# Patient Record
Sex: MALE | Race: WHITE | NOT HISPANIC OR LATINO | Employment: OTHER | URBAN - NONMETROPOLITAN AREA
[De-identification: names, ages, dates, MRNs, and addresses within clinical notes are randomized per-mention and may not be internally consistent; named-entity substitution may affect disease eponyms.]

---

## 2017-03-22 ENCOUNTER — HOSPITAL ENCOUNTER (INPATIENT)
Facility: HOSPITAL | Age: 72
LOS: 1 days | Discharge: HOME | DRG: 571 | End: 2017-03-24
Attending: NURSE PRACTITIONER | Admitting: SURGERY
Payer: COMMERCIAL

## 2017-03-22 ENCOUNTER — ANESTHESIA (OUTPATIENT)
Dept: SURGERY | Facility: HOSPITAL | Age: 72
DRG: 571 | End: 2017-03-22
Payer: COMMERCIAL

## 2017-03-22 ENCOUNTER — ANESTHESIA EVENT (OUTPATIENT)
Dept: SURGERY | Facility: HOSPITAL | Age: 72
DRG: 571 | End: 2017-03-22
Payer: COMMERCIAL

## 2017-03-22 DIAGNOSIS — S21.209A: Primary | ICD-10-CM

## 2017-03-22 DIAGNOSIS — L02.212 ABSCESS OF BACK: ICD-10-CM

## 2017-03-22 LAB
ALBUMIN SERPL-MCNC: 3.3 G/DL (ref 3.4–5)
ALP SERPL-CCNC: 75 U/L (ref 40–150)
ALT SERPL W P-5'-P-CCNC: 31 U/L (ref 0–70)
ANION GAP SERPL CALCULATED.3IONS-SCNC: 9 MMOL/L (ref 3–14)
AST SERPL W P-5'-P-CCNC: 12 U/L (ref 0–45)
BACTERIA SPEC CULT: NORMAL
BASOPHILS # BLD AUTO: 0.1 10E9/L (ref 0–0.2)
BASOPHILS NFR BLD AUTO: 0.5 %
BILIRUB SERPL-MCNC: 0.4 MG/DL (ref 0.2–1.3)
BUN SERPL-MCNC: 6 MG/DL (ref 7–30)
CALCIUM SERPL-MCNC: 9.1 MG/DL (ref 8.5–10.1)
CHLORIDE SERPL-SCNC: 99 MMOL/L (ref 94–109)
CO2 SERPL-SCNC: 26 MMOL/L (ref 20–32)
CREAT SERPL-MCNC: 0.73 MG/DL (ref 0.66–1.25)
CRP SERPL-MCNC: 165 MG/L (ref 0–8)
DIFFERENTIAL METHOD BLD: ABNORMAL
EOSINOPHIL # BLD AUTO: 0.1 10E9/L (ref 0–0.7)
EOSINOPHIL NFR BLD AUTO: 0.8 %
ERYTHROCYTE [DISTWIDTH] IN BLOOD BY AUTOMATED COUNT: 12.5 % (ref 10–15)
ERYTHROCYTE [SEDIMENTATION RATE] IN BLOOD BY WESTERGREN METHOD: 44 MM/H (ref 0–20)
GFR SERPL CREATININE-BSD FRML MDRD: ABNORMAL ML/MIN/1.7M2
GLUCOSE BLDC GLUCOMTR-MCNC: 166 MG/DL (ref 70–99)
GLUCOSE BLDC GLUCOMTR-MCNC: 250 MG/DL (ref 70–99)
GLUCOSE BLDC GLUCOMTR-MCNC: 439 MG/DL (ref 70–99)
GLUCOSE BLDC GLUCOMTR-MCNC: 441 MG/DL (ref 70–99)
GLUCOSE BLDC GLUCOMTR-MCNC: 466 MG/DL (ref 70–99)
GLUCOSE SERPL-MCNC: 190 MG/DL (ref 70–99)
GRAM STN SPEC: ABNORMAL
GRAM STN SPEC: NORMAL
GRAM STN SPEC: NORMAL
HCT VFR BLD AUTO: 42.8 % (ref 40–53)
HGB BLD-MCNC: 14.4 G/DL (ref 13.3–17.7)
IMM GRANULOCYTES # BLD: 0.3 10E9/L (ref 0–0.4)
IMM GRANULOCYTES NFR BLD: 2.2 %
LYMPHOCYTES # BLD AUTO: 1.9 10E9/L (ref 0.8–5.3)
LYMPHOCYTES NFR BLD AUTO: 13.2 %
MCH RBC QN AUTO: 29.3 PG (ref 26.5–33)
MCHC RBC AUTO-ENTMCNC: 33.6 G/DL (ref 31.5–36.5)
MCV RBC AUTO: 87 FL (ref 78–100)
MICRO REPORT STATUS: ABNORMAL
MICRO REPORT STATUS: NORMAL
MONOCYTES # BLD AUTO: 1.3 10E9/L (ref 0–1.3)
MONOCYTES NFR BLD AUTO: 8.7 %
NEUTROPHILS # BLD AUTO: 10.8 10E9/L (ref 1.6–8.3)
NEUTROPHILS NFR BLD AUTO: 74.6 %
NRBC # BLD AUTO: 0 10*3/UL
NRBC BLD AUTO-RTO: 0 /100
PLATELET # BLD AUTO: 385 10E9/L (ref 150–450)
POTASSIUM SERPL-SCNC: 4.1 MMOL/L (ref 3.4–5.3)
PROT SERPL-MCNC: 8.4 G/DL (ref 6.8–8.8)
RBC # BLD AUTO: 4.91 10E12/L (ref 4.4–5.9)
SODIUM SERPL-SCNC: 134 MMOL/L (ref 133–144)
SPECIMEN SOURCE: ABNORMAL
SPECIMEN SOURCE: NORMAL
WBC # BLD AUTO: 14.5 10E9/L (ref 4–11)

## 2017-03-22 PROCEDURE — 25000125 ZZHC RX 250: Performed by: NURSE PRACTITIONER

## 2017-03-22 PROCEDURE — 86140 C-REACTIVE PROTEIN: CPT | Performed by: NURSE PRACTITIONER

## 2017-03-22 PROCEDURE — 99214 OFFICE O/P EST MOD 30 MIN: CPT

## 2017-03-22 PROCEDURE — 25000132 ZZH RX MED GY IP 250 OP 250 PS 637: Performed by: SURGERY

## 2017-03-22 PROCEDURE — G0378 HOSPITAL OBSERVATION PER HR: HCPCS

## 2017-03-22 PROCEDURE — 87076 CULTURE ANAEROBE IDENT EACH: CPT | Performed by: SURGERY

## 2017-03-22 PROCEDURE — 87075 CULTR BACTERIA EXCEPT BLOOD: CPT | Performed by: SURGERY

## 2017-03-22 PROCEDURE — 87205 SMEAR GRAM STAIN: CPT | Performed by: NURSE PRACTITIONER

## 2017-03-22 PROCEDURE — 93005 ELECTROCARDIOGRAM TRACING: CPT

## 2017-03-22 PROCEDURE — 85025 COMPLETE CBC W/AUTO DIFF WBC: CPT | Performed by: NURSE PRACTITIONER

## 2017-03-22 PROCEDURE — 87070 CULTURE OTHR SPECIMN AEROBIC: CPT | Performed by: NURSE PRACTITIONER

## 2017-03-22 PROCEDURE — 37000009 ZZH ANESTHESIA TECHNICAL FEE, EACH ADDTL 15 MIN: Performed by: SURGERY

## 2017-03-22 PROCEDURE — S0077 INJECTION, CLINDAMYCIN PHOSP: HCPCS | Performed by: NURSE PRACTITIONER

## 2017-03-22 PROCEDURE — 37000008 ZZH ANESTHESIA TECHNICAL FEE, 1ST 30 MIN: Performed by: SURGERY

## 2017-03-22 PROCEDURE — 85652 RBC SED RATE AUTOMATED: CPT | Performed by: NURSE PRACTITIONER

## 2017-03-22 PROCEDURE — 11043 DBRDMT MUSC&/FSCA 1ST 20/<: CPT | Performed by: SURGERY

## 2017-03-22 PROCEDURE — 99222 1ST HOSP IP/OBS MODERATE 55: CPT | Mod: 25 | Performed by: SURGERY

## 2017-03-22 PROCEDURE — 25000125 ZZHC RX 250: Performed by: NURSE ANESTHETIST, CERTIFIED REGISTERED

## 2017-03-22 PROCEDURE — 87070 CULTURE OTHR SPECIMN AEROBIC: CPT | Performed by: SURGERY

## 2017-03-22 PROCEDURE — 93010 ELECTROCARDIOGRAM REPORT: CPT | Performed by: INTERNAL MEDICINE

## 2017-03-22 PROCEDURE — 99203 OFFICE O/P NEW LOW 30 MIN: CPT | Performed by: NURSE PRACTITIONER

## 2017-03-22 PROCEDURE — S0077 INJECTION, CLINDAMYCIN PHOSP: HCPCS | Performed by: SURGERY

## 2017-03-22 PROCEDURE — 0JB70ZZ EXCISION OF BACK SUBCUTANEOUS TISSUE AND FASCIA, OPEN APPROACH: ICD-10-PCS | Performed by: SURGERY

## 2017-03-22 PROCEDURE — 40000268 ZZH STATISTIC NO CHARGES

## 2017-03-22 PROCEDURE — 87205 SMEAR GRAM STAIN: CPT | Performed by: SURGERY

## 2017-03-22 PROCEDURE — 25000131 ZZH RX MED GY IP 250 OP 636 PS 637: Performed by: SURGERY

## 2017-03-22 PROCEDURE — 40000786 ZZHCL STATISTIC ACTIVE MRSA SURVEILLANCE CULTURE: Performed by: SURGERY

## 2017-03-22 PROCEDURE — 25800025 ZZH RX 258: Performed by: NURSE ANESTHETIST, CERTIFIED REGISTERED

## 2017-03-22 PROCEDURE — 36415 COLL VENOUS BLD VENIPUNCTURE: CPT | Performed by: NURSE PRACTITIONER

## 2017-03-22 PROCEDURE — 36000044 ZZH SURGERY LEVEL 1 1ST 30 MIN: Performed by: SURGERY

## 2017-03-22 PROCEDURE — 25000566 ZZH SEVOFLURANE, EA 15 MIN: Performed by: NURSE ANESTHETIST, CERTIFIED REGISTERED

## 2017-03-22 PROCEDURE — 71000014 ZZH RECOVERY PHASE 1 LEVEL 2 FIRST HR: Performed by: SURGERY

## 2017-03-22 PROCEDURE — 40000305 ZZH STATISTIC PRE PROC ASSESS I: Performed by: SURGERY

## 2017-03-22 PROCEDURE — 25000128 H RX IP 250 OP 636: Performed by: NURSE PRACTITIONER

## 2017-03-22 PROCEDURE — 36000046 ZZH SURGERY LEVEL 1 EA 15 ADDTL MIN: Performed by: SURGERY

## 2017-03-22 PROCEDURE — 01999 UNLISTED ANES PROCEDURE: CPT | Performed by: NURSE ANESTHETIST, CERTIFIED REGISTERED

## 2017-03-22 PROCEDURE — 80053 COMPREHEN METABOLIC PANEL: CPT | Performed by: NURSE PRACTITIONER

## 2017-03-22 PROCEDURE — 25000128 H RX IP 250 OP 636: Performed by: NURSE ANESTHETIST, CERTIFIED REGISTERED

## 2017-03-22 PROCEDURE — 00000146 ZZHCL STATISTIC GLUCOSE BY METER IP

## 2017-03-22 PROCEDURE — 25000125 ZZHC RX 250: Performed by: SURGERY

## 2017-03-22 RX ORDER — METFORMIN HCL 500 MG
2000 TABLET, EXTENDED RELEASE 24 HR ORAL DAILY
COMMUNITY
End: 2018-02-12

## 2017-03-22 RX ORDER — LISINOPRIL 2.5 MG/1
1.25 TABLET ORAL DAILY
Status: DISCONTINUED | OUTPATIENT
Start: 2017-03-22 | End: 2017-03-22

## 2017-03-22 RX ORDER — SODIUM CHLORIDE 9 MG/ML
INJECTION, SOLUTION INTRAVENOUS CONTINUOUS
Status: DISCONTINUED | OUTPATIENT
Start: 2017-03-22 | End: 2017-03-23

## 2017-03-22 RX ORDER — ONDANSETRON 2 MG/ML
4 INJECTION INTRAMUSCULAR; INTRAVENOUS EVERY 6 HOURS PRN
Status: DISCONTINUED | OUTPATIENT
Start: 2017-03-22 | End: 2017-03-23

## 2017-03-22 RX ORDER — HYDRALAZINE HYDROCHLORIDE 20 MG/ML
2.5-5 INJECTION INTRAMUSCULAR; INTRAVENOUS EVERY 10 MIN PRN
Status: DISCONTINUED | OUTPATIENT
Start: 2017-03-22 | End: 2017-03-23

## 2017-03-22 RX ORDER — HYDROCODONE BITARTRATE AND ACETAMINOPHEN 5; 325 MG/1; MG/1
1 TABLET ORAL EVERY 6 HOURS PRN
COMMUNITY
End: 2017-04-03

## 2017-03-22 RX ORDER — HYDROMORPHONE HYDROCHLORIDE 1 MG/ML
.3-.5 INJECTION, SOLUTION INTRAMUSCULAR; INTRAVENOUS; SUBCUTANEOUS
Status: DISCONTINUED | OUTPATIENT
Start: 2017-03-22 | End: 2017-03-22

## 2017-03-22 RX ORDER — FENTANYL CITRATE 50 UG/ML
25-50 INJECTION, SOLUTION INTRAMUSCULAR; INTRAVENOUS
Status: DISCONTINUED | OUTPATIENT
Start: 2017-03-22 | End: 2017-03-22 | Stop reason: HOSPADM

## 2017-03-22 RX ORDER — LISINOPRIL 5 MG/1
5 TABLET ORAL DAILY
Status: DISCONTINUED | OUTPATIENT
Start: 2017-03-22 | End: 2017-03-23

## 2017-03-22 RX ORDER — OXYCODONE HYDROCHLORIDE 5 MG/1
5 TABLET ORAL EVERY 4 HOURS PRN
Status: DISCONTINUED | OUTPATIENT
Start: 2017-03-22 | End: 2017-03-23

## 2017-03-22 RX ORDER — SODIUM CHLORIDE, SODIUM LACTATE, POTASSIUM CHLORIDE, CALCIUM CHLORIDE 600; 310; 30; 20 MG/100ML; MG/100ML; MG/100ML; MG/100ML
INJECTION, SOLUTION INTRAVENOUS CONTINUOUS
Status: DISCONTINUED | OUTPATIENT
Start: 2017-03-22 | End: 2017-03-22 | Stop reason: HOSPADM

## 2017-03-22 RX ORDER — NALOXONE HYDROCHLORIDE 0.4 MG/ML
.1-.4 INJECTION, SOLUTION INTRAMUSCULAR; INTRAVENOUS; SUBCUTANEOUS
Status: CANCELLED | OUTPATIENT
Start: 2017-03-22

## 2017-03-22 RX ORDER — ACETAMINOPHEN 325 MG/1
325 TABLET ORAL EVERY 4 HOURS PRN
Status: DISCONTINUED | OUTPATIENT
Start: 2017-03-22 | End: 2017-03-23

## 2017-03-22 RX ORDER — MEPERIDINE HYDROCHLORIDE 25 MG/ML
12.5 INJECTION INTRAMUSCULAR; INTRAVENOUS; SUBCUTANEOUS
Status: DISCONTINUED | OUTPATIENT
Start: 2017-03-22 | End: 2017-03-22 | Stop reason: HOSPADM

## 2017-03-22 RX ORDER — ONDANSETRON 2 MG/ML
INJECTION INTRAMUSCULAR; INTRAVENOUS PRN
Status: DISCONTINUED | OUTPATIENT
Start: 2017-03-22 | End: 2017-03-22

## 2017-03-22 RX ORDER — LABETALOL HYDROCHLORIDE 5 MG/ML
10 INJECTION, SOLUTION INTRAVENOUS
Status: DISCONTINUED | OUTPATIENT
Start: 2017-03-22 | End: 2017-03-22 | Stop reason: HOSPADM

## 2017-03-22 RX ORDER — DICLOXACILLIN SODIUM 500 MG
500 CAPSULE ORAL 4 TIMES DAILY
COMMUNITY
End: 2017-03-27

## 2017-03-22 RX ORDER — HYDROMORPHONE HYDROCHLORIDE 1 MG/ML
.3-.5 INJECTION, SOLUTION INTRAMUSCULAR; INTRAVENOUS; SUBCUTANEOUS EVERY 10 MIN PRN
Status: DISCONTINUED | OUTPATIENT
Start: 2017-03-22 | End: 2017-03-22 | Stop reason: HOSPADM

## 2017-03-22 RX ORDER — SODIUM CHLORIDE 9 MG/ML
INJECTION, SOLUTION INTRAVENOUS CONTINUOUS
Status: CANCELLED | OUTPATIENT
Start: 2017-03-22

## 2017-03-22 RX ORDER — ONDANSETRON 2 MG/ML
4 INJECTION INTRAMUSCULAR; INTRAVENOUS EVERY 30 MIN PRN
Status: DISCONTINUED | OUTPATIENT
Start: 2017-03-22 | End: 2017-03-22 | Stop reason: HOSPADM

## 2017-03-22 RX ORDER — ONDANSETRON 2 MG/ML
4 INJECTION INTRAMUSCULAR; INTRAVENOUS EVERY 6 HOURS PRN
Status: DISCONTINUED | OUTPATIENT
Start: 2017-03-22 | End: 2017-03-22

## 2017-03-22 RX ORDER — PROPOFOL 10 MG/ML
INJECTION, EMULSION INTRAVENOUS PRN
Status: DISCONTINUED | OUTPATIENT
Start: 2017-03-22 | End: 2017-03-22

## 2017-03-22 RX ORDER — DEXTROSE MONOHYDRATE 25 G/50ML
25-50 INJECTION, SOLUTION INTRAVENOUS
Status: DISCONTINUED | OUTPATIENT
Start: 2017-03-22 | End: 2017-03-22

## 2017-03-22 RX ORDER — HYDROMORPHONE HYDROCHLORIDE 1 MG/ML
.3-.5 INJECTION, SOLUTION INTRAMUSCULAR; INTRAVENOUS; SUBCUTANEOUS
Status: DISCONTINUED | OUTPATIENT
Start: 2017-03-22 | End: 2017-03-23

## 2017-03-22 RX ORDER — HYDROCODONE BITARTRATE AND ACETAMINOPHEN 5; 325 MG/1; MG/1
1 TABLET ORAL EVERY 6 HOURS PRN
Status: DISCONTINUED | OUTPATIENT
Start: 2017-03-22 | End: 2017-03-23

## 2017-03-22 RX ORDER — CLINDAMYCIN IN PERCENT DEXTROSE 6 MG/ML
300 INJECTION, SOLUTION INTRAVENOUS EVERY 6 HOURS
Status: DISCONTINUED | OUTPATIENT
Start: 2017-03-22 | End: 2017-03-24

## 2017-03-22 RX ORDER — ALBUTEROL SULFATE 0.83 MG/ML
2.5 SOLUTION RESPIRATORY (INHALATION) EVERY 4 HOURS PRN
Status: DISCONTINUED | OUTPATIENT
Start: 2017-03-22 | End: 2017-03-22 | Stop reason: HOSPADM

## 2017-03-22 RX ORDER — ONDANSETRON 4 MG/1
4 TABLET, ORALLY DISINTEGRATING ORAL EVERY 30 MIN PRN
Status: DISCONTINUED | OUTPATIENT
Start: 2017-03-22 | End: 2017-03-22 | Stop reason: HOSPADM

## 2017-03-22 RX ORDER — CLINDAMYCIN IN PERCENT DEXTROSE 6 MG/ML
300 INJECTION, SOLUTION INTRAVENOUS EVERY 6 HOURS
Status: DISCONTINUED | OUTPATIENT
Start: 2017-03-22 | End: 2017-03-22

## 2017-03-22 RX ORDER — DEXAMETHASONE SODIUM PHOSPHATE 10 MG/ML
INJECTION, SOLUTION INTRAMUSCULAR; INTRAVENOUS PRN
Status: DISCONTINUED | OUTPATIENT
Start: 2017-03-22 | End: 2017-03-22

## 2017-03-22 RX ORDER — DEXTROSE MONOHYDRATE 25 G/50ML
25-50 INJECTION, SOLUTION INTRAVENOUS
Status: DISCONTINUED | OUTPATIENT
Start: 2017-03-22 | End: 2017-03-23

## 2017-03-22 RX ORDER — FENTANYL CITRATE 50 UG/ML
INJECTION, SOLUTION INTRAMUSCULAR; INTRAVENOUS PRN
Status: DISCONTINUED | OUTPATIENT
Start: 2017-03-22 | End: 2017-03-22

## 2017-03-22 RX ORDER — NICOTINE POLACRILEX 4 MG
15-30 LOZENGE BUCCAL
Status: DISCONTINUED | OUTPATIENT
Start: 2017-03-22 | End: 2017-03-23

## 2017-03-22 RX ORDER — NALOXONE HYDROCHLORIDE 0.4 MG/ML
.1-.4 INJECTION, SOLUTION INTRAMUSCULAR; INTRAVENOUS; SUBCUTANEOUS
Status: DISCONTINUED | OUTPATIENT
Start: 2017-03-22 | End: 2017-03-23

## 2017-03-22 RX ORDER — LIDOCAINE HYDROCHLORIDE 20 MG/ML
INJECTION, SOLUTION INFILTRATION; PERINEURAL PRN
Status: DISCONTINUED | OUTPATIENT
Start: 2017-03-22 | End: 2017-03-22

## 2017-03-22 RX ORDER — NALOXONE HYDROCHLORIDE 0.4 MG/ML
.1-.4 INJECTION, SOLUTION INTRAMUSCULAR; INTRAVENOUS; SUBCUTANEOUS
Status: DISCONTINUED | OUTPATIENT
Start: 2017-03-22 | End: 2017-03-22 | Stop reason: HOSPADM

## 2017-03-22 RX ORDER — NICOTINE POLACRILEX 4 MG
15-30 LOZENGE BUCCAL
Status: DISCONTINUED | OUTPATIENT
Start: 2017-03-22 | End: 2017-03-22

## 2017-03-22 RX ADMIN — FENTANYL CITRATE 100 MCG: 50 INJECTION, SOLUTION INTRAMUSCULAR; INTRAVENOUS at 15:51

## 2017-03-22 RX ADMIN — LISINOPRIL 5 MG: 5 TABLET ORAL at 21:04

## 2017-03-22 RX ADMIN — ONDANSETRON 4 MG: 2 INJECTION INTRAMUSCULAR; INTRAVENOUS at 15:48

## 2017-03-22 RX ADMIN — CLINDAMYCIN PHOSPHATE 300 MG: 6 INJECTION, SOLUTION INTRAVENOUS at 21:04

## 2017-03-22 RX ADMIN — CLINDAMYCIN PHOSPHATE 300 MG: 6 INJECTION, SOLUTION INTRAVENOUS at 15:48

## 2017-03-22 RX ADMIN — INSULIN ASPART 2 UNITS: 100 INJECTION, SOLUTION INTRAVENOUS; SUBCUTANEOUS at 18:38

## 2017-03-22 RX ADMIN — HYDROMORPHONE HYDROCHLORIDE 0.3 MG: 1 INJECTION, SOLUTION INTRAMUSCULAR; INTRAVENOUS; SUBCUTANEOUS at 16:50

## 2017-03-22 RX ADMIN — MIDAZOLAM HYDROCHLORIDE 2 MG: 1 INJECTION, SOLUTION INTRAMUSCULAR; INTRAVENOUS at 15:48

## 2017-03-22 RX ADMIN — FENTANYL CITRATE 50 MCG: 50 INJECTION, SOLUTION INTRAMUSCULAR; INTRAVENOUS at 16:11

## 2017-03-22 RX ADMIN — LIDOCAINE HYDROCHLORIDE 40 MG: 20 INJECTION, SOLUTION INFILTRATION; PERINEURAL at 15:55

## 2017-03-22 RX ADMIN — PROPOFOL 30 MG: 10 INJECTION, EMULSION INTRAVENOUS at 16:55

## 2017-03-22 RX ADMIN — PROPOFOL 200 MG: 10 INJECTION, EMULSION INTRAVENOUS at 15:55

## 2017-03-22 RX ADMIN — PROPOFOL 50 MG: 10 INJECTION, EMULSION INTRAVENOUS at 16:12

## 2017-03-22 RX ADMIN — DEXAMETHASONE SODIUM PHOSPHATE 10 MG: 10 INJECTION, SOLUTION INTRAMUSCULAR; INTRAVENOUS at 16:05

## 2017-03-22 RX ADMIN — HYDROMORPHONE HYDROCHLORIDE 0.2 MG: 1 INJECTION, SOLUTION INTRAMUSCULAR; INTRAVENOUS; SUBCUTANEOUS at 17:10

## 2017-03-22 RX ADMIN — SUCCINYLCHOLINE CHLORIDE 140 MG: 20 INJECTION, SOLUTION INTRAMUSCULAR; INTRAVENOUS at 15:55

## 2017-03-22 RX ADMIN — SODIUM CHLORIDE: 9 INJECTION, SOLUTION INTRAVENOUS at 14:30

## 2017-03-22 RX ADMIN — INSULIN ASPART 4 UNITS: 100 INJECTION, SOLUTION INTRAVENOUS; SUBCUTANEOUS at 21:46

## 2017-03-22 RX ADMIN — CLINDAMYCIN PHOSPHATE 300 MG: 6 INJECTION, SOLUTION INTRAVENOUS at 15:23

## 2017-03-22 RX ADMIN — SODIUM CHLORIDE, POTASSIUM CHLORIDE, SODIUM LACTATE AND CALCIUM CHLORIDE: 600; 310; 30; 20 INJECTION, SOLUTION INTRAVENOUS at 16:49

## 2017-03-22 RX ADMIN — FENTANYL CITRATE 50 MCG: 50 INJECTION, SOLUTION INTRAMUSCULAR; INTRAVENOUS at 16:14

## 2017-03-22 ASSESSMENT — ENCOUNTER SYMPTOMS: WOUND: 1

## 2017-03-22 NOTE — IP AVS SNAPSHOT
[facility]    [address] [place] [id number]    Phone:  [phone]    Fax:  [phone]                                       After Visit Summary   3/22/2017    Trevor Alexandre    MRN: [id number]           After Visit Summary Signature Page     I have received my discharge instructions, and my questions have been answered. I have discussed any challenges I see with this plan with the nurse or doctor.    ..........................................................................................................................................  Patient/Patient Representative Signature      ..........................................................................................................................................  Patient Representative Print Name and Relationship to Patient    ..................................................               ................................................  Date                                            Time    ..........................................................................................................................................  Reviewed by Signature/Title    ...................................................              ..............................................  Date                                                            Time

## 2017-03-22 NOTE — ED PROVIDER NOTES
History     Chief Complaint   Patient presents with     Cyst     The history is provided by the patient and medical records. No  was used.     Trevor Alexandre is a 71 year old male who presents with an abscess to his back. Has been there for years, hit it recently and it started draining. [facility], [facility] on monocycline at that time. Symptoms didn't improve and was then started on doxycycline. Was referred to General Surgery for evaluation. Symptoms continued to worsen. Blood sugars running in the 150s, last A1c was 8.0. His glypizide was increased at that time.   Last ate at 6 AM, has had a few sips of water since.     I have reviewed the Medications, Allergies, Past Medical and Surgical History, and Social History in the Epic system.    Review of Systems   Skin: Positive for wound (Abscess to back.).       Physical Exam   BP: 151/95  Pulse: 88  Temp: 98.5  F (36.9  C)  Resp: 16  SpO2: 98 %  Physical Exam   Constitutional: He is oriented to person, place, and time. He appears well-developed and well-nourished. No distress.   HENT:   Head: Normocephalic and atraumatic.   Eyes: Conjunctivae are normal. No scleral icterus.   Neck: Normal range of motion. Neck supple.   Cardiovascular: Normal rate, regular rhythm and normal heart sounds.    Pulmonary/Chest: Effort normal and breath sounds normal.   Neurological: He is alert and oriented to person, place, and time.   Skin: He is not diaphoretic.        Psychiatric: He has a normal mood and affect. His behavior is normal.   Nursing note and vitals reviewed.      ED Course     ED Course     Procedures     Consulted with Dr. Dale, he will be down to see patient. Labs placed. Pt placed on NPO status.     Labs Ordered and Resulted from Time of ED Arrival Up to the Time of Departure from the ED   CBC WITH PLATELETS DIFFERENTIAL - Abnormal; Notable for the following:        Result Value    WBC 14.5 (*)     Absolute Neutrophil 10.8 (*)      All other components within normal limits   COMPREHENSIVE METABOLIC PANEL - Abnormal; Notable for the following:     Glucose 190 (*)     Urea Nitrogen 6 (*)     Albumin 3.3 (*)     All other components within normal limits   CRP INFLAMMATION - Abnormal; Notable for the following:     CRP Inflammation 165.0 (*)     All other components within normal limits   ERYTHROCYTE SEDIMENTATION RATE AUTO - Abnormal; Notable for the following:     Sed Rate 44 (*)     All other components within normal limits   WOUND CULTURE AEROBIC BACTERIAL   GRAM STAIN       Assessments & Plan (with Medical Decision Making)     I have reviewed the nursing notes.    I have reviewed the findings, diagnosis, plan and need for follow up with the patient.  Labs place, General Surgeon to see patient.   Patient to be taken to OR.   Call placed to [facility], left message for RN.   12:57 PM Notified Fee Basis at [facility].     1:40 PM Tammy called back from [facility], I updated her on patient status. Including [facility], [facility] - no response, worsening symptoms and presentation of abscess. Tammy will update his chart and contact patient post-operatively. Advised her of his likely wound care and follow up after surgery and extent of infection.      Final diagnoses:   Abscess of back       3/22/2017   [facility]     Nisha Brooks NP  03/22/17 1251       Nisha Brooks NP  03/22/17 2624

## 2017-03-22 NOTE — IP AVS SNAPSHOT
MRN:[id number]                      After Visit Summary   3/22/2017    Trevor Alexandre    MRN: [id number]           Thank you!     Thank you for choosing [facility] for your care. Our goal is always to provide you with excellent care. Hearing back from our patients is one way we can continue to improve our services. Please take a few minutes to complete the written survey that you may receive in the mail after you visit with us. Thank you!        Patient Information     Date Of Birth          1945        About your hospital stay     You were admitted on:  March 22, 2017 You last received care in the:  [facility]    You were discharged on:  March 24, 2017        Reason for your hospital stay       Incision and drainage back abscess                  Who to Call     For medical emergencies, please call 911.  For non-urgent questions about your medical care, please call your primary care provider or clinic, [phone]  For questions related to your surgery, please call your surgery clinic        Attending Provider     Provider Specialty    Nisha Brooks NP Urgent Care    Servando Dale MD General Surgery       Primary Care Provider Office Phone # Fax #    [facility] [phone]       [address] [place] [address]         When to contact your care team       Malfunctioning wound vac.  Increased pain, fevers, nausea/vomiting                  After Care Instructions     Activity       Your activity upon discharge: activity as tolerated, no driving while taking narcotics            Diet       Follow this diet upon discharge: Moderate consistent carbohydrate (9233-3182 wesley / 4-6 CHO units per meal)            Wound care and dressings       Instructions to care for your wound at home: follow wound care orders                  Follow-up Appointments     Follow-up and recommended labs and tests        Follow up with local surgeons within  2 weeks. Follow up with wound care next Monday 3/27/17 for wound vac change.  Should the wound vac stop working, present to medical professionals to switch to damp to dry dressings.                  Your next 10 appointments already scheduled     Mar 27, 2017  8:00 AM CDT   [facility] Visit with [facility]   [facility] ([facility] )    [address] [address]   [phone]            Mar 29, 2017  1:00 PM CDT   Return Visit with [facility]   [facility] ([facility] )    [address] [address]   [phone]            Mar 31, 2017  3:00 PM CDT   [facility] Visit with [facility]   [facility] ([facility] )    [address] [address]   [phone]            Apr 03, 2017  2:00 PM CDT   [facility] Visit with [facility]   [facility] ([facility] )    [address] [address]   [phone]            Apr 05, 2017  2:00 PM CDT   [facility] Visit with [facility]   [facility] ([facility] )    [address] [address]   [phone]            Apr 07, 2017  2:00 PM CDT   [facility] Visit with [facility]   [facility] ([facility] )    [address] [address]   [phone]            Apr 11, 2017 11:30 AM CDT   (Arrive by 11:15 AM)   Post Op with Tahir De Jesus DO   [facility] ([facility])    [address] [place] [id number]   [phone]              Additional Services     [facility]       WOUND/OSTOMY CENTER (WOC) TO EVALUATE, INITIATE TREATMENT BASED ON WOUND OSTOMY PROTOCOL, AND RECOMMEND AND REVISE AS NEEDED AN INDIVIDUALIZED TREATMENT CARE PLAN:    Conservative Sharp Debridement of non-viable and loose necrotic tissue  Topical 5% Lidocaine ointment   for pain associated with wound care/debridement; apply thin layer   Dakin s Solution (Sodium Hypochlorite  Topical 0.125%) to cleanse wounds with odor & signs of infection  Antifungal Powder and Cream   applied topically to areas of fungal dermatitis  Cadexomer Iodine (Iodosorb) 0.9% Topical Gel apply topically to wound beds with slough/soft necrotic tissue with suspected bioburden  Collagenase Enzymatic Debrider (Santyl ointment)   250units/gram apply topically to wound beds with necrotic debris/slough;   Acetic Acid 0.25% to cleanse wounds with odor and signs of infection  Lidocaine Hydrochloride  topical solution 4% to assist with Wound Vac dressing remove  Silver Sulfadiazine topical cream; apply topically to burns/wound beds  Hydrocortisone 1% cream; apply topically to areas with dermatitis  Silver nitrate topical stick to control minor bleeding, and applied to epibole and hypergranular tissue  Negative Pressure Wound Therapy (additional physician order needed)  Triple Antibiotic ointment for wounds with signs of infection or at risk for infection  Topical agents, dressings, or  products per Wound Ostomy Protocol Clinical Guidelines  Compression therapy as indicated for lower extremity ulcers caused by venous insufficiency or complicated by edema.  BITA above 0.8   medium compression; BITA  0.6   0.8   light compression  Therapeutic support surfaces for bed and/or chair and off-loading devices to minimize pressure    DIAGNOSTICS THAT MAY BE ORDERED BY [facility] OR APRN; ORDERS TO BE PLACED UNDER THE REFERRING PROVIDER OR APRN FOR REVIEW AND ANY NEEDED ACTION:    Wound culture when signs of infection or colonization are present  BITA in patients with Lower Extremity ulcerations; TBI for diabetics as indicated  Hgb A1c for diabetics or those suspected of undiagnosed diabetes  Albumin or Prealbumin if nutritional concerns   UA/UC if UTI suspected in Urostomy patient  Cdiff if suspected in Fecal Ostomy patient  ANCILLARY CONSULTS AS NEEDED:    Pedorithist/Prosthetist for specialty shoes/orthotic  Diabetes Education for improved  blood glucose during wound healing  Medical Nutrition Therapy for diabetes management and wound and/or ostomy healing nutrition   PT/OT   assess tissue loads & need for positioning devices, mobility safety/ ADL s, lymphedema treatment    OSTOMY ONLY:    Preoperative stoma site marking if requested by surgeon  Equipment selection/Ostomy supplies based on assessment  Treatment of stomal and peristomal complications  Irrigation/Lavage    NOTE EXCLUSIONS FROM ABOVE ITEMS HERE: no ostomy      No name on file.  [facility]                  Further instructions from your care team       Wound Care:    Bring canister and dressing kit to each wound center visit.  Please come to admitting at the [facility] entrance to the hospital 15 min prior to your appointment.  Contact [facility] with any questions related to the actual equipment - the phone number is on the front of the wound vac  The Wound Center is open Monday - Friday during normal business hours for questions ([phone] option #3)  If wound vac fails to operate for > 2 hours - seek medical care immediately for a damp to dry dressing     Pending Results     Date and Time Order Name Status Description    3/22/2017 1616 Anaerobic bacterial culture Preliminary     3/22/2017 1616 Abscess Culture Aerobic Bacterial Preliminary     3/22/2017 1613 Tissue Culture Aerobic Bacterial Preliminary     3/22/2017 1613 Anaerobic bacterial culture Preliminary     3/22/2017 1203 Wound Culture Aerobic Bacterial Preliminary             Statement of Approval     Ordered          03/24/17 1515  I have reviewed and agree with all the recommendations and orders detailed in this document.  EFFECTIVE NOW     Approved and electronically signed by:  Tahir De Jesus DO             Admission Information     Date & Time Provider Department Dept. Phone    3/22/2017 Servando Dale MD [facility] [phone]      Your Vitals Were     Blood Pressure Pulse Temperature Respirations Height  "Weight    137/65 89 97.6  F (36.4  C) (Tympanic) 18 1.803 m (5' 11\") 98.2 kg (216 lb 7.9 oz)    Pulse Oximetry BMI (Body Mass Index)                99% 30.19 kg/m2          [employer] Information     [employer] lets you send messages to your doctor, view your test results, renew your prescriptions, schedule appointments and more. To sign up, go to [email or web address] . Click on \"Log in\" on the left side of the screen, which will take you to the Welcome page. Then click on \"Sign up Now\" on the right side of the page.     You will be asked to enter the access code listed below, as well as some personal information. Please follow the directions to create your username and password.     Your access code is: [email or web address]  Expires: 2017  3:18 PM     Your access code will  in 90 days. If you need help or a new code, please call your [facility] clinic or [phone].        Care EveryWhere ID     This is your Care EveryWhere ID. This could be used by other organizations to access your [facility] medical records  [id number]           Review of your medicines      START taking        Dose / Directions    oxyCODONE 5 MG IR tablet   Commonly known as:  ROXICODONE        Dose:  5-10 mg   Take 1-2 tablets (5-10 mg) by mouth every 6 hours as needed for moderate to severe pain (1 hour before dressing change)   Quantity:  30 tablet   Refills:  0         CONTINUE these medicines which have NOT CHANGED        Dose / Directions    dicloxacillin 500 MG capsule   Commonly known as:  DYNAPEN        Dose:  500 mg   Take 500 mg by mouth 4 times daily   Refills:  0       GLIPIZIDE PO        Dose:  7.5 mg   Take 7.5 mg by mouth 2 times daily (before meals)   Refills:  0       HYDROcodone-acetaminophen 5-325 MG per tablet   Commonly known as:  NORCO        Dose:  1 tablet   Take 1 tablet by mouth every 6 hours as needed for moderate to severe pain   Refills:  0       LISINOPRIL PO        Dose:  5 mg   Take 5 mg by mouth daily "   Refills:  0       metFORMIN 500 MG 24 hr tablet   Commonly known as:  GLUCOPHAGE-XR        Dose:  2000 mg   Take 2,000 mg by mouth daily   Refills:  0            Where to get your medicines      Some of these will need a paper prescription and others can be bought over the counter. Ask your nurse if you have questions.     Bring a paper prescription for each of these medications     oxyCODONE 5 MG IR tablet                Protect others around you: Learn how to safely use, store and throw away your medicines at www.disposemymeds.org.             Medication List: This is a list of all your medications and when to take them. Check marks below indicate your daily home schedule. Keep this list as a reference.      Medications           Morning Afternoon Evening Bedtime As Needed    dicloxacillin 500 MG capsule   Commonly known as:  DYNAPEN   Take 500 mg by mouth 4 times daily                                GLIPIZIDE PO   Take 7.5 mg by mouth 2 times daily (before meals)                                HYDROcodone-acetaminophen 5-325 MG per tablet   Commonly known as:  NORCO   Take 1 tablet by mouth every 6 hours as needed for moderate to severe pain                                LISINOPRIL PO   Take 5 mg by mouth daily   Last time this was given:  5 mg on 3/23/2017  8:36 PM                                metFORMIN 500 MG 24 hr tablet   Commonly known as:  GLUCOPHAGE-XR   Take 2,000 mg by mouth daily   Last time this was given:  2,000 mg on 3/24/2017 10:30 AM                                oxyCODONE 5 MG IR tablet   Commonly known as:  ROXICODONE   Take 1-2 tablets (5-10 mg) by mouth every 6 hours as needed for moderate to severe pain (1 hour before dressing change)   Last time this was given:  5 mg on 3/24/2017  3:16 PM                                          More Information        Abscess (Incision & Drainage)  An abscess (sometimes called a  boil ) occurs when bacteria get trapped under the skin and begin to grow.  Pus forms inside the abscess as the body responds to the bacteria. An abscess can occur with an insect bite, ingrown hair, blocked oil gland, pimple, cyst, or puncture wound.  Treatment of your abscess has required an incision to drain the pus. If the abscess pocket was large, a gauze packing may have been inserted. This will need to be removed and possibly replaced on your next visit. Antibiotics are not required in the treatment of a simple abscess, unless the infection is spreading into the skin around the wound (known as  cellulitis ).  Healing of the wound will take about one to two weeks depending on the size of the abscess. Healthy tissue will grow from the bottom and sides of the opening until it seals over.  Home care  The following home care guidelines can help your wound heal:    The wound may drain for the first two days. Cover the wound with a clean dry dressing. If the dressing becomes soaked with blood or pus, change it.    If a gauze packing was placed inside the abscess cavity, you may be advised to remove it yourself. You may do this in the shower. Once the packing is removed, you should wash the area in the shower or bath 3 to 4 times a day, until the skin opening has closed. Make sure you wash your hands after changing the packing or cleaning the wound.    If you were prescribed antibiotics, take them as directed until they are all gone.    You may use acetaminophen (Tylenol) or ibuprofen (Motrin, Advil) to control pain, unless another pain medicine was prescribed. If you have liver disease or ever had a stomach ulcer, talk with your doctor before using these medicines.  Follow-up care  Follow up with your doctor as advised by our staff. If a gauze packing was inserted in your wound, it should be removed in 1 to 2 days. Check your wound every day for the signs of worsening infection listed below.  When to seek medical care  Get prompt medical attention if any of the following occur:    Increasing  redness or swelling    Red streaks in the skin leading away from the wound    Increasing local pain or swelling    Continued pus draining from the wound two days after treatment    Fever of 100.4 F (38 C) or higher, or as directed by your health care provider    Boil returns when you are at home.    5638-4362 [employer]. [address] [place] [address]. All rights reserved. This information is not intended as a substitute for professional medical care. Always follow your healthcare professional's instructions.                Vacuum-Assisted Closure of a Wound  Vacuum-assisted closure (VAC) of a wound is a type of treatment to help wounds heal. It s also known as negative pressure wound therapy. During the treatment, a device lowers air pressure on the wound. This can help the wound heal more quickly.  Understanding the wound VAC system  A wound VAC system has several parts. A foam or gauze dressing is put directly on the wound. The dressing is changed every 24 to 72 hours. An adhesive film covers and seals the dressing and wound. A drainage tube leads from under the adhesive film and connects to a portable vacuum pump. This pump removes air pressure over the wound. It may do this constantly. Or it may do it in cycles. During the treatment, you ll need to carry the portable pump everywhere you go.  Why wound VAC is used  You might need this therapy for a recent traumatic wound. Or you may need it for a chronic wound. This is a wound that does not heal the way it should over time. This can happen with wounds in people who have diabetes. You may need a wound VAC if you ve had a recent skin graft. And you may need a wound VAC for a large wound. Large wounds can take a longer time to heal.  A wound vacuum system may help your wound heal more quickly by:    Draining extra fluid from the wound    Reducing swelling    Reducing bacteria in the wound    Keeping your wound moist and warm    Helping  draw together wound edges    Increasing blood flow to your wound    Decreasing inflammation  Wound VAC offers some other advantages over other types of wound care. It may decrease your overall discomfort. The dressings usually need to be changed less often. And they may be easier to keep in position.  Risks of wound VAC  Wound VAC has some rare risks, such as:    Bleeding (which may be severe)    Wound infection    An abnormal connection between the intestinal tract and the skin (enteric fistula)  Proper training in dressing changes can help reduce the risk for these complications. Also, your doctor will carefully evaluate you to make sure you are a good candidate for the therapy. Certain problems can increase your risk for complications. These include:    Exposed organs or blood vessels    High risk of bleeding from another medical problem    Wound infection    Nearby bone infection    Dead wound tissue    Cancer tissue    Fragile skin, such as from aging or longtime use of topical steroids    Allergy to adhesive    Very poor blood flow to your wound    Wounds close to joints that may reopen because of movement  Your doctor will discuss the risks that apply to you. Make sure to talk with him or her about all of your questions and concerns.  Getting ready for wound VAC  You likely won t need to do much to get ready for wound VAC. In some cases, you may need to wait a while before having this therapy. For example, your doctor may first need to treat an infection in your wound. Dead or damaged tissue may also need to be removed from your wound.  You or a caregiver may need training on how to use the wound VAC device. This is done if you will be able to have your wound vacuum therapy at home. In other cases, you may need to have your wound vacuum therapy in a health care facility.  On the day of your procedure  A health care provider will cover your wound with foam or gauze wound dressing. An adhesive film will be put  over the dressing and wound. This seals the wound. The foam connects to a drainage tube, which leads to a vacuum pump. This pump is portable. When the pump is turned on, it draws fluid through the foam and out the drainage tubing. The pump may run constantly, or it may cycle off and on. Your exact setup will depend on the specific type of wound vacuum system that you use.  Managing your wound  You may need the dressing changed about once a day. You may need it changed more or less often, depending on your wound. You or your caregiver may be trained to do this at home. Or it may be done by a visiting health care provider. Your doctor may prescribe a pain medicine. This is to prevent or reduce pain during the dressing change.  You will likely need to use the wound VAC system for several weeks or months. During this time, you ll carry the portable pump everywhere you go.  Nutrition for wound healing  During this time, make sure you follow a healthy diet. This is needed so the wound can heal and to prevent infection. Your doctor can tell you more about what to include in your diet during this time.  follow up with your doctor if you have a medical condition that led to your wound, such as diabetes. He or she can help you prevent future wounds.  Follow-up care  Your doctor will carefully keep track of your healing. Make sure to keep all follow-up appointments.  When to call your health care provider  Call your health care provider right away if you have any of these:    Fever of 100.4 F (38.0 C) or higher    Increased redness, swelling, or warmth around wound    Increased pain    Bright red blood or blood clots in tubing or the collection chamber of the vacuum        3649-1228 [employer]. [address] [place] [address], [place] [address]. All rights reserved. This information is not intended as a substitute for professional medical care. Always follow your healthcare professional's  instructions.                Patient Education    Oxycodone Hydrochloride Oral capsule    Oxycodone Hydrochloride Oral solution    Oxycodone Hydrochloride Oral tablet    Oxycodone Hydrochloride Oral tablet [Abuse Deterrent]    Oxycodone Hydrochloride Oral tablet, extended-release  Oxycodone Hydrochloride Oral tablet  What is this medicine?  OXYCODONE (ox i KOE done) is a pain reliever. It is used to treat moderate to severe pain.  This medicine may be used for other purposes; ask your health care provider or pharmacist if you have questions.  What should I tell my health care provider before I take this medicine?  They need to know if you have any of these conditions:    Sanjiv's disease    brain tumor    drug abuse or addiction    head injury    heart disease    if you frequently drink alcohol containing drinks    kidney disease or problems going to the bathroom    liver disease    lung disease, asthma, or breathing problems    mental problems    an unusual or allergic reaction to oxycodone, codeine, hydrocodone, morphine, other medicines, foods, dyes, or preservatives    pregnant or trying to get pregnant    breast-feeding  How should I use this medicine?  Take this medicine by mouth with a glass of water. Follow the directions on the prescription label. You can take it with or without food. If it upsets your stomach, take it with food. Take your medicine at regular intervals. Do not take it more often than directed. Do not stop taking except on your doctor's advice.  Some brands of this medicine, like Oxecta, have special instructions. Ask your doctor or pharmacist if these directions are for you: Do not cut, crush or chew this medicine. Swallow only one tablet at a time. Do not wet, soak, or lick the tablet before you take it.  Talk to your pediatrician regarding the use of this medicine in children. Special care may be needed.  Overdosage: If you think you have taken too much of this medicine contact a poison  control center or emergency room at once.  NOTE: This medicine is only for you. Do not share this medicine with others.  What if I miss a dose?  If you miss a dose, take it as soon as you can. If it is almost time for your next dose, take only that dose. Do not take double or extra doses.  What may interact with this medicine?    alcohol    antihistamines    certain medicines used for nausea like chlorpromazine, droperidol    erythromycin    ketoconazole    medicines for depression, anxiety, or psychotic disturbances    medicines for sleep    muscle relaxants    naloxone    naltrexone    narcotic medicines (opiates) for pain    nilotinib    phenobarbital    phenytoin    rifampin    ritonavir    voriconazole  This list may not describe all possible interactions. Give your health care provider a list of all the medicines, herbs, non-prescription drugs, or dietary supplements you use. Also tell them if you smoke, drink alcohol, or use illegal drugs. Some items may interact with your medicine.  What should I watch for while using this medicine?  Tell your doctor or health care professional if your pain does not go away, if it gets worse, or if you have new or a different type of pain. You may develop tolerance to the medicine. Tolerance means that you will need a higher dose of the medicine for pain relief. Tolerance is normal and is expected if you take this medicine for a long time.  Do not suddenly stop taking your medicine because you may develop a severe reaction. Your body becomes used to the medicine. This does NOT mean you are addicted. Addiction is a behavior related to getting and using a drug for a non-medical reason. If you have pain, you have a medical reason to take pain medicine. Your doctor will tell you how much medicine to take. If your doctor wants you to stop the medicine, the dose will be slowly lowered over time to avoid any side effects.  You may get drowsy or dizzy when you first start taking  this medicine or change doses. Do not drive, use machinery, or do anything that may be dangerous until you know how the medicine affects you. Stand or sit up slowly.  There are different types of narcotic medicines (opiates) for pain. If you take more than one type at the same time, you may have more side effects. Give your health care provider a list of all medicines you use. Your doctor will tell you how much medicine to take. Do not take more medicine than directed. Call emergency for help if you have problems breathing.  This medicine will cause constipation. Try to have a bowel movement at least every 2 to 3 days. If you do not have a bowel movement for 3 days, call your doctor or health care professional.  Your mouth may get dry. Drinking water, chewing sugarless gum, or sucking on hard candy may help. See your dentist every 6 months.  What side effects may I notice from receiving this medicine?  Side effects that you should report to your doctor or health care professional as soon as possible:    allergic reactions like skin rash, itching or hives, swelling of the face, lips, or tongue    breathing problems    confusion    feeling faint or lightheaded, falls    trouble passing urine or change in the amount of urine    unusually weak or tired  Side effects that usually do not require medical attention (report to your doctor or health care professional if they continue or are bothersome):    constipation    dry mouth    itching    nausea, vomiting    upset stomach  This list may not describe all possible side effects. Call your doctor for medical advice about side effects. You may report side effects to FDA at 1-222-FDA-4095.  Where should I keep my medicine?  Keep out of the reach of children. This medicine can be abused. Keep your medicine in a safe place to protect it from theft. Do not share this medicine with anyone. Selling or giving away this medicine is dangerous and against the law.  Store at room  temperature between 15 and 30 degrees C (59 and 86 degrees F). Protect from light. Keep container tightly closed.  This medicine may cause accidental overdose and death if it is taken by other adults, children, or pets. Flush any unused medicine down the toilet to reduce the chance of harm. Do not use the medicine after the expiration date.  NOTE: This sheet is a summary. It may not cover all possible information. If you have questions about this medicine, talk to your doctor, pharmacist, or health care provider.  NOTE:This sheet is a summary. It may not cover all possible information. If you have questions about this medicine, talk to your doctor, pharmacist, or health care provider. Copyright  2016 Gold Standard

## 2017-03-22 NOTE — BRIEF OP NOTE
[facility] Brief Operative Note    Pre-operative diagnosis: ABSCESS ON BACK   Post-operative diagnosis Abscess back   Procedure: Procedure(s):  INCISION AND DRAINAGE and Debridement  ABSCESS ON BACK - Wound Class: I-Clean   Surgeon: Servando Dale MD   Assistants(s):    Estimated blood loss: 1000 cc's    Specimens: Tissue from abscess, cultures   Findings: Abscess 10 cm x 9 cm x 5 cm, down to fascia

## 2017-03-22 NOTE — ED NOTES
Pt ambulated independently to room 1.  Pt reports he has a large cyst for 6 years, bumped it 2/2017 and it started draining, [facility], pt states pain at 8/10.

## 2017-03-22 NOTE — ANESTHESIA CARE TRANSFER NOTE
Patient: Trevor Alexandre    Procedure(s):  INCISION AND DRAINAGE and Debridement  ABSCESS ON BACK - Wound Class: I-Clean    Diagnosis: ABSCESS ON BACK  Diagnosis Additional Information: No value filed.    Anesthesia Type:   General     Note:  Airway :Nasal Cannula  Patient transferred to:ICU  Comments: Anesthesia Care Transfer Note    Patient: Trevor Alexandre    Transferred to: PACU    Patient vital signs: Stable    Airway: None    To PACU on supplemental O2.  Placed on monitor.  Report given to RN, all questions answered.  VSS.    Cassius Shore CRNA  3/22/2017      Vitals: (Last set prior to Anesthesia Care Transfer)    CRNA VITALS  3/22/2017 1636 - 3/22/2017 1720      3/22/2017             Pulse: 90    SpO2: 100 %                Electronically Signed By: CHARIS Yu CRNA  March 22, 2017  5:20 PM

## 2017-03-22 NOTE — OR NURSING
Pateint discharged to med/surg.  Alena score 10/10. Pain level 2/10.  Discharged from unit via cart.  Hand off report given to vicki mccoy

## 2017-03-22 NOTE — PLAN OF CARE
"Problem: Goal Outcome Summary  Goal: Goal Outcome Summary  [facility] Observation Note:  Patient is registered to observation and is in 3106 at approximately 1400 via wheel chair accompanied by daughter from urgent care .Patient ambulated to bed via self.. Patient is alert and oriented X 3, reports pain; rates at 6 on 0-10 scale.  Patient oriented to room, unit, hourly rounding, and plan of care. Explained the admission packet including \"What is Observation Status\" and patient handbook with patient bill of rights brochure. Will continue to monitor and document as needed.  Nursing Observation criteria listed below was met:    Health care directives status obtained and documented: Yes    Care Everywhere authorization completed No        MRSA swab completed for patient 65 years and older: No and admisson occurred during downtime- will pass to evening shift to complete        If initial lactic acid >2.0, repeat lactic acid drawn within one hour of arrival to unit: No. If no, state reason: na        Patient identifies a surrogate decision maker: Yes If yes, who:daughter                Vaccination assessment and education completed: Yes              Vaccinations received prior to hospitalization: Pneumovax yes             Influenza(seasonal)  YES              Vaccination(s) ordered: patient does not meet criteria        Skin issues/needs documented:Yes- large abscess measured on back 10cm X 10cm.  To go to OR shortly for I & D.    Isolation Patient: no Education given and documented, correct sign in place and documentation row added to PCS:  No        Fall Prevention: Observation fall risk completed:  Yes Education given and documented, sticker and magnet in place: Yes        General Care Plan initiated with observation goal(s): Yes    Education (including assessment) Documented: Yes    New medication information given to patient and documented: No    Patient elects to use own medications from home during " hospitalization:  No If yes, a MD order was obtained to use Medications from Home:  No and home medications were sent to Pharmacy for verification for use during hospitalization: No    Patient has discharge needs (If yes, please explain): Yes- Caro from care management notified and has spoken to patient.  1430- IV started, awaiting IV abx from pharmacy. Admission intake in process during EPIC downtime.   1455- OR called and tech on way to get patient for OR.   Patient sent to surgery via cart at 3:00 PM, on 3/22/2017 by transport tech. Report called to Katy JOY RN in SBAR format.      Face to face report given with opportunity to observe patient.    Report given to Tracy Person   3/22/2017  3:29 PM

## 2017-03-22 NOTE — ANESTHESIA PREPROCEDURE EVALUATION
Anesthesia Evaluation     . Pt has had prior anesthetic. Type: MAC    No history of anesthetic complications          ROS/MED HX    ENT/Pulmonary:     (+)SOWMYA risk factors snores loudly, hypertension, , . .    Neurologic:  - neg neurologic ROS     Cardiovascular:     (+) hypertension----. : . . . :. . Previous cardiac testing date:results:date: results:ECG reviewed date:3/22/17 results:NSR date: results:          METS/Exercise Tolerance:  >4 METS   Hematologic:  - neg hematologic  ROS       Musculoskeletal:  - neg musculoskeletal ROS       GI/Hepatic:  - neg GI/hepatic ROS       Renal/Genitourinary:  - ROS Renal section negative       Endo:     (+) type II DM Not using insulin - not using insulin pump not previously admitted for DM/DKA .      Psychiatric:  - neg psychiatric ROS       Infectious Disease:   (+) Other Infectious Disease Back abscess      Malignancy:      - no malignancy   Other:    (+) No chance of pregnancy C-spine cleared: N/A, no H/O Chronic Pain,no other significant disability   - neg other ROS                 Physical Exam  Normal systems: cardiovascular, pulmonary and dental    Airway   Mallampati: II  TM distance: >3 FB  Neck ROM: full    Dental     Cardiovascular   Rhythm and rate: regular and normal      Pulmonary    breath sounds clear to auscultation                    Anesthesia Plan      History & Physical Review  History and physical reviewed and following examination; no interval change.    ASA Status:  2 emergent.    NPO Status:  > 8 hours    Plan for General with Intravenous and Propofol induction. Maintenance will be Balanced.    PONV prophylaxis:  Ondansetron (or other 5HT-3) and Dexamethasone or Solumedrol  Discussed risks and benefits with patient for general anesthesia including sore throat, nausea, vomiting, aspiration, dental damage, loss of airway, CV complications, stroke, MI, death. Pt wishes to proceed.       Postoperative Care  Postoperative pain management:  IV analgesics  and Oral pain medications.      Consents  Anesthetic plan, risks, benefits and alternatives discussed with:  Patient.  Use of blood products discussed: Yes.   Use of blood products discussed with Patient.  Consented to blood products.  .                          .

## 2017-03-22 NOTE — ED NOTES
Dr Espinoza to bedside to assess pt, pt signed consent for I&D abscess back witnessed by this writer.

## 2017-03-22 NOTE — ED NOTES
Pt presents with c/o having a cyst on his back that he has been on antibiotics for since march first.

## 2017-03-23 ENCOUNTER — ANESTHESIA (OUTPATIENT)
Dept: SURGERY | Facility: HOSPITAL | Age: 72
DRG: 571 | End: 2017-03-23
Payer: COMMERCIAL

## 2017-03-23 ENCOUNTER — ANESTHESIA EVENT (OUTPATIENT)
Dept: SURGERY | Facility: HOSPITAL | Age: 72
DRG: 571 | End: 2017-03-23
Payer: COMMERCIAL

## 2017-03-23 LAB
ANION GAP SERPL CALCULATED.3IONS-SCNC: 8 MMOL/L (ref 3–14)
BASOPHILS # BLD AUTO: 0.1 10E9/L (ref 0–0.2)
BASOPHILS NFR BLD AUTO: 0.4 %
BUN SERPL-MCNC: 10 MG/DL (ref 7–30)
CALCIUM SERPL-MCNC: 8.1 MG/DL (ref 8.5–10.1)
CHLORIDE SERPL-SCNC: 106 MMOL/L (ref 94–109)
CO2 SERPL-SCNC: 25 MMOL/L (ref 20–32)
CREAT SERPL-MCNC: 0.7 MG/DL (ref 0.66–1.25)
DIFFERENTIAL METHOD BLD: ABNORMAL
EOSINOPHIL # BLD AUTO: 0 10E9/L (ref 0–0.7)
EOSINOPHIL NFR BLD AUTO: 0.2 %
ERYTHROCYTE [DISTWIDTH] IN BLOOD BY AUTOMATED COUNT: 12.5 % (ref 10–15)
EST. AVERAGE GLUCOSE BLD GHB EST-MCNC: 183 MG/DL
GFR SERPL CREATININE-BSD FRML MDRD: ABNORMAL ML/MIN/1.7M2
GLUCOSE BLDC GLUCOMTR-MCNC: 163 MG/DL (ref 70–99)
GLUCOSE BLDC GLUCOMTR-MCNC: 186 MG/DL (ref 70–99)
GLUCOSE BLDC GLUCOMTR-MCNC: 205 MG/DL (ref 70–99)
GLUCOSE BLDC GLUCOMTR-MCNC: 275 MG/DL (ref 70–99)
GLUCOSE BLDC GLUCOMTR-MCNC: 331 MG/DL (ref 70–99)
GLUCOSE BLDC GLUCOMTR-MCNC: 341 MG/DL (ref 70–99)
GLUCOSE SERPL-MCNC: 217 MG/DL (ref 70–99)
HBA1C MFR BLD: 8 % (ref 4.3–6)
HCT VFR BLD AUTO: 28.8 % (ref 40–53)
HGB BLD-MCNC: 9.5 G/DL (ref 13.3–17.7)
IMM GRANULOCYTES # BLD: 0.5 10E9/L (ref 0–0.4)
IMM GRANULOCYTES NFR BLD: 3.7 %
LYMPHOCYTES # BLD AUTO: 2.5 10E9/L (ref 0.8–5.3)
LYMPHOCYTES NFR BLD AUTO: 17.2 %
MCH RBC QN AUTO: 29 PG (ref 26.5–33)
MCHC RBC AUTO-ENTMCNC: 33 G/DL (ref 31.5–36.5)
MCV RBC AUTO: 88 FL (ref 78–100)
MONOCYTES # BLD AUTO: 1.3 10E9/L (ref 0–1.3)
MONOCYTES NFR BLD AUTO: 9 %
NEUTROPHILS # BLD AUTO: 9.9 10E9/L (ref 1.6–8.3)
NEUTROPHILS NFR BLD AUTO: 69.5 %
NRBC # BLD AUTO: 0 10*3/UL
NRBC BLD AUTO-RTO: 0 /100
PLATELET # BLD AUTO: 367 10E9/L (ref 150–450)
POTASSIUM SERPL-SCNC: 4.3 MMOL/L (ref 3.4–5.3)
RBC # BLD AUTO: 3.28 10E12/L (ref 4.4–5.9)
SODIUM SERPL-SCNC: 139 MMOL/L (ref 133–144)
WBC # BLD AUTO: 14.2 10E9/L (ref 4–11)

## 2017-03-23 PROCEDURE — 85025 COMPLETE CBC W/AUTO DIFF WBC: CPT | Performed by: SURGERY

## 2017-03-23 PROCEDURE — 12000000 ZZH R&B MED SURG/OB

## 2017-03-23 PROCEDURE — 80048 BASIC METABOLIC PNL TOTAL CA: CPT | Performed by: SURGERY

## 2017-03-23 PROCEDURE — 00000146 ZZHCL STATISTIC GLUCOSE BY METER IP

## 2017-03-23 PROCEDURE — 25000125 ZZHC RX 250: Performed by: NURSE PRACTITIONER

## 2017-03-23 PROCEDURE — 25000128 H RX IP 250 OP 636: Performed by: SURGERY

## 2017-03-23 PROCEDURE — 40000788 ZZHCL STATISTIC ESTIMATED AVERAGE GLUCOSE: Performed by: SURGERY

## 2017-03-23 PROCEDURE — 83036 HEMOGLOBIN GLYCOSYLATED A1C: CPT | Performed by: SURGERY

## 2017-03-23 PROCEDURE — G0378 HOSPITAL OBSERVATION PER HR: HCPCS

## 2017-03-23 PROCEDURE — 25000125 ZZHC RX 250: Performed by: NURSE ANESTHETIST, CERTIFIED REGISTERED

## 2017-03-23 PROCEDURE — 37000009 ZZH ANESTHESIA TECHNICAL FEE, EACH ADDTL 15 MIN: Performed by: SURGERY

## 2017-03-23 PROCEDURE — 36415 COLL VENOUS BLD VENIPUNCTURE: CPT | Performed by: SURGERY

## 2017-03-23 PROCEDURE — 36000050 ZZH SURGERY LEVEL 2 1ST 30 MIN: Performed by: SURGERY

## 2017-03-23 PROCEDURE — 37000008 ZZH ANESTHESIA TECHNICAL FEE, 1ST 30 MIN: Performed by: SURGERY

## 2017-03-23 PROCEDURE — 25000566 ZZH SEVOFLURANE, EA 15 MIN: Performed by: ANESTHESIOLOGY

## 2017-03-23 PROCEDURE — 25800025 ZZH RX 258: Performed by: ANESTHESIOLOGY

## 2017-03-23 PROCEDURE — 40000305 ZZH STATISTIC PRE PROC ASSESS I: Performed by: SURGERY

## 2017-03-23 PROCEDURE — 25000128 H RX IP 250 OP 636: Performed by: NURSE PRACTITIONER

## 2017-03-23 PROCEDURE — S0077 INJECTION, CLINDAMYCIN PHOSP: HCPCS | Performed by: NURSE PRACTITIONER

## 2017-03-23 PROCEDURE — 71000014 ZZH RECOVERY PHASE 1 LEVEL 2 FIRST HR: Performed by: SURGERY

## 2017-03-23 PROCEDURE — 36000052 ZZH SURGERY LEVEL 2 EA 15 ADDTL MIN: Performed by: SURGERY

## 2017-03-23 PROCEDURE — 10061 I&D ABSCESS COMP/MULTIPLE: CPT | Performed by: ANESTHESIOLOGY

## 2017-03-23 PROCEDURE — 2W05X6Z CHANGE PRESSURE DRESSING ON BACK: ICD-10-PCS | Performed by: SURGERY

## 2017-03-23 PROCEDURE — 27210794 ZZH OR GENERAL SUPPLY STERILE: Performed by: SURGERY

## 2017-03-23 PROCEDURE — 15852 DRESSING CHANGE NOT FOR BURN: CPT | Mod: 78 | Performed by: SURGERY

## 2017-03-23 PROCEDURE — 01999 UNLISTED ANES PROCEDURE: CPT | Performed by: NURSE ANESTHETIST, CERTIFIED REGISTERED

## 2017-03-23 PROCEDURE — 25000128 H RX IP 250 OP 636: Performed by: NURSE ANESTHETIST, CERTIFIED REGISTERED

## 2017-03-23 PROCEDURE — 25000132 ZZH RX MED GY IP 250 OP 250 PS 637: Performed by: SURGERY

## 2017-03-23 RX ORDER — LIDOCAINE HYDROCHLORIDE 20 MG/ML
INJECTION, SOLUTION INFILTRATION; PERINEURAL PRN
Status: DISCONTINUED | OUTPATIENT
Start: 2017-03-23 | End: 2017-03-23

## 2017-03-23 RX ORDER — FENTANYL CITRATE 50 UG/ML
INJECTION, SOLUTION INTRAMUSCULAR; INTRAVENOUS PRN
Status: DISCONTINUED | OUTPATIENT
Start: 2017-03-23 | End: 2017-03-23

## 2017-03-23 RX ORDER — NALOXONE HYDROCHLORIDE 0.4 MG/ML
.1-.4 INJECTION, SOLUTION INTRAMUSCULAR; INTRAVENOUS; SUBCUTANEOUS
Status: DISCONTINUED | OUTPATIENT
Start: 2017-03-23 | End: 2017-03-24 | Stop reason: HOSPADM

## 2017-03-23 RX ORDER — SODIUM CHLORIDE, SODIUM LACTATE, POTASSIUM CHLORIDE, CALCIUM CHLORIDE 600; 310; 30; 20 MG/100ML; MG/100ML; MG/100ML; MG/100ML
INJECTION, SOLUTION INTRAVENOUS CONTINUOUS
Status: DISCONTINUED | OUTPATIENT
Start: 2017-03-23 | End: 2017-03-23 | Stop reason: HOSPADM

## 2017-03-23 RX ORDER — ONDANSETRON 2 MG/ML
INJECTION INTRAMUSCULAR; INTRAVENOUS PRN
Status: DISCONTINUED | OUTPATIENT
Start: 2017-03-23 | End: 2017-03-23

## 2017-03-23 RX ORDER — LISINOPRIL 5 MG/1
5 TABLET ORAL DAILY
Status: DISCONTINUED | OUTPATIENT
Start: 2017-03-23 | End: 2017-03-24 | Stop reason: HOSPADM

## 2017-03-23 RX ORDER — ONDANSETRON 4 MG/1
4 TABLET, ORALLY DISINTEGRATING ORAL EVERY 30 MIN PRN
Status: DISCONTINUED | OUTPATIENT
Start: 2017-03-23 | End: 2017-03-23 | Stop reason: HOSPADM

## 2017-03-23 RX ORDER — SODIUM CHLORIDE 9 MG/ML
INJECTION, SOLUTION INTRAVENOUS CONTINUOUS
Status: DISCONTINUED | OUTPATIENT
Start: 2017-03-23 | End: 2017-03-24

## 2017-03-23 RX ORDER — DEXAMETHASONE SODIUM PHOSPHATE 10 MG/ML
INJECTION, SOLUTION INTRAMUSCULAR; INTRAVENOUS PRN
Status: DISCONTINUED | OUTPATIENT
Start: 2017-03-23 | End: 2017-03-23

## 2017-03-23 RX ORDER — DEXAMETHASONE SODIUM PHOSPHATE 4 MG/ML
4 INJECTION, SOLUTION INTRA-ARTICULAR; INTRALESIONAL; INTRAMUSCULAR; INTRAVENOUS; SOFT TISSUE EVERY 10 MIN PRN
Status: DISCONTINUED | OUTPATIENT
Start: 2017-03-23 | End: 2017-03-23 | Stop reason: HOSPADM

## 2017-03-23 RX ORDER — NALOXONE HYDROCHLORIDE 0.4 MG/ML
.1-.4 INJECTION, SOLUTION INTRAMUSCULAR; INTRAVENOUS; SUBCUTANEOUS
Status: DISCONTINUED | OUTPATIENT
Start: 2017-03-23 | End: 2017-03-23 | Stop reason: HOSPADM

## 2017-03-23 RX ORDER — NICOTINE POLACRILEX 4 MG
15-30 LOZENGE BUCCAL
Status: DISCONTINUED | OUTPATIENT
Start: 2017-03-23 | End: 2017-03-24 | Stop reason: HOSPADM

## 2017-03-23 RX ORDER — FENTANYL CITRATE 50 UG/ML
25-50 INJECTION, SOLUTION INTRAMUSCULAR; INTRAVENOUS
Status: DISCONTINUED | OUTPATIENT
Start: 2017-03-23 | End: 2017-03-23 | Stop reason: HOSPADM

## 2017-03-23 RX ORDER — MEPERIDINE HYDROCHLORIDE 25 MG/ML
12.5 INJECTION INTRAMUSCULAR; INTRAVENOUS; SUBCUTANEOUS
Status: DISCONTINUED | OUTPATIENT
Start: 2017-03-23 | End: 2017-03-23 | Stop reason: HOSPADM

## 2017-03-23 RX ORDER — PROMETHAZINE HYDROCHLORIDE 25 MG/ML
12.5 INJECTION, SOLUTION INTRAMUSCULAR; INTRAVENOUS
Status: DISCONTINUED | OUTPATIENT
Start: 2017-03-23 | End: 2017-03-23 | Stop reason: HOSPADM

## 2017-03-23 RX ORDER — ONDANSETRON 2 MG/ML
4 INJECTION INTRAMUSCULAR; INTRAVENOUS EVERY 6 HOURS PRN
Status: DISCONTINUED | OUTPATIENT
Start: 2017-03-23 | End: 2017-03-24 | Stop reason: HOSPADM

## 2017-03-23 RX ORDER — HYDROMORPHONE HYDROCHLORIDE 1 MG/ML
.3-.5 INJECTION, SOLUTION INTRAMUSCULAR; INTRAVENOUS; SUBCUTANEOUS EVERY 10 MIN PRN
Status: DISCONTINUED | OUTPATIENT
Start: 2017-03-23 | End: 2017-03-23 | Stop reason: HOSPADM

## 2017-03-23 RX ORDER — DEXTROSE MONOHYDRATE 25 G/50ML
25-50 INJECTION, SOLUTION INTRAVENOUS
Status: DISCONTINUED | OUTPATIENT
Start: 2017-03-23 | End: 2017-03-24 | Stop reason: HOSPADM

## 2017-03-23 RX ORDER — ACETAMINOPHEN 325 MG/1
325 TABLET ORAL EVERY 4 HOURS PRN
Status: DISCONTINUED | OUTPATIENT
Start: 2017-03-23 | End: 2017-03-24

## 2017-03-23 RX ORDER — LABETALOL HYDROCHLORIDE 5 MG/ML
10 INJECTION, SOLUTION INTRAVENOUS
Status: DISCONTINUED | OUTPATIENT
Start: 2017-03-23 | End: 2017-03-23 | Stop reason: HOSPADM

## 2017-03-23 RX ORDER — HYDRALAZINE HYDROCHLORIDE 20 MG/ML
2.5-5 INJECTION INTRAMUSCULAR; INTRAVENOUS EVERY 10 MIN PRN
Status: DISCONTINUED | OUTPATIENT
Start: 2017-03-23 | End: 2017-03-24 | Stop reason: HOSPADM

## 2017-03-23 RX ORDER — OXYCODONE HYDROCHLORIDE 5 MG/1
5 TABLET ORAL EVERY 4 HOURS PRN
Status: DISCONTINUED | OUTPATIENT
Start: 2017-03-23 | End: 2017-03-24

## 2017-03-23 RX ORDER — HYDROMORPHONE HYDROCHLORIDE 1 MG/ML
.3-.5 INJECTION, SOLUTION INTRAMUSCULAR; INTRAVENOUS; SUBCUTANEOUS
Status: DISCONTINUED | OUTPATIENT
Start: 2017-03-23 | End: 2017-03-24

## 2017-03-23 RX ORDER — ONDANSETRON 2 MG/ML
4 INJECTION INTRAMUSCULAR; INTRAVENOUS EVERY 30 MIN PRN
Status: DISCONTINUED | OUTPATIENT
Start: 2017-03-23 | End: 2017-03-23 | Stop reason: HOSPADM

## 2017-03-23 RX ORDER — PROPOFOL 10 MG/ML
INJECTION, EMULSION INTRAVENOUS PRN
Status: DISCONTINUED | OUTPATIENT
Start: 2017-03-23 | End: 2017-03-23

## 2017-03-23 RX ORDER — ALBUTEROL SULFATE 0.83 MG/ML
2.5 SOLUTION RESPIRATORY (INHALATION) EVERY 4 HOURS PRN
Status: DISCONTINUED | OUTPATIENT
Start: 2017-03-23 | End: 2017-03-23 | Stop reason: HOSPADM

## 2017-03-23 RX ORDER — HYDRALAZINE HYDROCHLORIDE 20 MG/ML
2.5-5 INJECTION INTRAMUSCULAR; INTRAVENOUS EVERY 10 MIN PRN
Status: DISCONTINUED | OUTPATIENT
Start: 2017-03-23 | End: 2017-03-23 | Stop reason: HOSPADM

## 2017-03-23 RX ADMIN — PROPOFOL 150 MG: 10 INJECTION, EMULSION INTRAVENOUS at 11:49

## 2017-03-23 RX ADMIN — LIDOCAINE HYDROCHLORIDE 40 MG: 20 INJECTION, SOLUTION INFILTRATION; PERINEURAL at 11:49

## 2017-03-23 RX ADMIN — ONDANSETRON 4 MG: 2 INJECTION INTRAMUSCULAR; INTRAVENOUS at 12:10

## 2017-03-23 RX ADMIN — DEXAMETHASONE SODIUM PHOSPHATE 4 MG: 10 INJECTION, SOLUTION INTRAMUSCULAR; INTRAVENOUS at 12:10

## 2017-03-23 RX ADMIN — MIDAZOLAM HYDROCHLORIDE 2 MG: 1 INJECTION, SOLUTION INTRAMUSCULAR; INTRAVENOUS at 11:41

## 2017-03-23 RX ADMIN — SODIUM CHLORIDE, POTASSIUM CHLORIDE, SODIUM LACTATE AND CALCIUM CHLORIDE: 600; 310; 30; 20 INJECTION, SOLUTION INTRAVENOUS at 11:40

## 2017-03-23 RX ADMIN — CLINDAMYCIN PHOSPHATE 300 MG: 6 INJECTION, SOLUTION INTRAVENOUS at 09:30

## 2017-03-23 RX ADMIN — SODIUM CHLORIDE: 9 INJECTION, SOLUTION INTRAVENOUS at 05:58

## 2017-03-23 RX ADMIN — CLINDAMYCIN PHOSPHATE 300 MG: 6 INJECTION, SOLUTION INTRAVENOUS at 20:39

## 2017-03-23 RX ADMIN — CLINDAMYCIN PHOSPHATE 300 MG: 6 INJECTION, SOLUTION INTRAVENOUS at 02:47

## 2017-03-23 RX ADMIN — INSULIN ASPART 3 UNITS: 100 INJECTION, SOLUTION INTRAVENOUS; SUBCUTANEOUS at 21:20

## 2017-03-23 RX ADMIN — INSULIN ASPART 1 UNITS: 100 INJECTION, SOLUTION INTRAVENOUS; SUBCUTANEOUS at 06:01

## 2017-03-23 RX ADMIN — ENOXAPARIN SODIUM 40 MG: 40 INJECTION SUBCUTANEOUS at 17:59

## 2017-03-23 RX ADMIN — INSULIN ASPART 2 UNITS: 100 INJECTION, SOLUTION INTRAVENOUS; SUBCUTANEOUS at 02:07

## 2017-03-23 RX ADMIN — SODIUM CHLORIDE: 9 INJECTION, SOLUTION INTRAVENOUS at 14:15

## 2017-03-23 RX ADMIN — FENTANYL CITRATE 50 MCG: 50 INJECTION, SOLUTION INTRAMUSCULAR; INTRAVENOUS at 11:44

## 2017-03-23 RX ADMIN — INSULIN ASPART 2 UNITS: 100 INJECTION, SOLUTION INTRAVENOUS; SUBCUTANEOUS at 17:56

## 2017-03-23 RX ADMIN — SUCCINYLCHOLINE CHLORIDE 100 MG: 20 INJECTION, SOLUTION INTRAMUSCULAR; INTRAVENOUS at 11:50

## 2017-03-23 RX ADMIN — FENTANYL CITRATE 50 MCG: 50 INJECTION, SOLUTION INTRAMUSCULAR; INTRAVENOUS at 12:03

## 2017-03-23 RX ADMIN — LISINOPRIL 5 MG: 5 TABLET ORAL at 20:36

## 2017-03-23 RX ADMIN — CLINDAMYCIN PHOSPHATE 300 MG: 6 INJECTION, SOLUTION INTRAVENOUS at 14:24

## 2017-03-23 RX ADMIN — INSULIN ASPART 1 UNITS: 100 INJECTION, SOLUTION INTRAVENOUS; SUBCUTANEOUS at 13:41

## 2017-03-23 ASSESSMENT — LIFESTYLE VARIABLES: TOBACCO_USE: 1

## 2017-03-23 NOTE — PLAN OF CARE
Problem: Goal Outcome Summary  Goal: Goal Outcome Summary  Outcome: Therapy, progress toward functional goals as expected  Patient arrived to floor @1749.  Is alert and oriented X4.  Denies any pain or discomfort. MRSA swab sent to lab per protocol.  Patient lungs are clear bilat, bowel sounds audible, pedal pulses palpable. Blood sugars observed q4 hours.  Patient denies pain.  Dressing on back in clean dry and intact. Patient states he prefers to lay on his side.  Encouraged repositioning while in bed.  Patient independent in room, gait is steady. Uses urinal in bathroom. Ate 100% of his meal.  Blood sugar elevated. See provider notification note. Plan to continue to observe.

## 2017-03-23 NOTE — PLAN OF CARE
patient down for his debridement, nurse to nurse report given to Sadaf HAMLIN, per Sadaf HAMLIN she will check patients blood sugar while patient is in OR.

## 2017-03-23 NOTE — PROGRESS NOTES
Received a consult for possible wound vac placement for this pt following a possible debridement today and tomorrow in the OR.  Started application for placement of [employer] VAC.      1630 - Received notification that pt does not have Medicare Part B and will need VA approval.  This can be a lengthy process.  Notified Kisha,  and Dr De Jesus that we may not be obtain a wound vac before discharge.  We need a purchase order from the [facility] for the wound vac and that needs to be faxed to [facility].  Kisha was notified and will follow up with the [facility].

## 2017-03-23 NOTE — PROGRESS NOTES
BENJAMIN SALGADO - Visit by  during hospital rounds. Patient was alert and responded well. Noted he hoped to be home in another day. Patient requested no spiritual care services at this time.

## 2017-03-23 NOTE — PLAN OF CARE
Problem: Goal Outcome Summary  Goal: Goal Outcome Summary  Outcome: No Change  Denies pain.  Dressing to mid back CDI.  BGL checked q 4 hrs.  Slept most of NOC.    Face to face report given with opportunity to observe patient.    Report given to Bibi TORRES RN.    Caro Matt   3/23/2017  7:31 AM

## 2017-03-23 NOTE — CONSULTS
DATE OF VISIT:  03/22/2017      HISTORY OF PRESENT ILLNESS:  I see Mr. Trevor Alexandre who is a pleasant 71-year-old male by urgent care because of a large abscess on the patient's back.  The patient states that he has had a longstanding lump in the area.  This has been present for a number of months.  The patient states that towards the end of February he struck the area on a door.  This resulted in the lump getting bigger and causing him pain.  [facility]  He was diagnosed with infection and started on minocycline.  Shortly after leaving the clinic, the area did start draining and drained a copious amount of fluid.  The area has been continuing to drain since then.  As he was having increasing pain he did end up contacting the [facility] and his antibiotic was changed to Doxycycline.  Despite this, he has continued to have increasing pain and drainage.  [facility] and was sent to urgent care.  He was assessed there and I was asked to see him.  The patient denies any fever or chills.  He has been having increasing pain and the drainage.      He is diabetic, controlled on oral hypoglycemic agents.  Normally his sugars are reasonably well controlled.      He is otherwise reasonably healthy.  He denies any heart or lung troubles.  He has never had a heart attack.  No history of irregular heartbeat.  No history of stroke or threatened stroke.  He is not a cigarette smoker.  Denies shortness of breath.  No history of asthma.  He does not have a history of chronic renal disease.  There is a history of hypertension.      CURRENT MEDICATIONS:  Norco, glipizide, metformin, and lisinopril, as well as his Doxycycline.      ALLERGIES:  He has no known allergies.      PAST SURGICAL HISTORY:  He has had a previous open cholecystectomy.  Has had shoulder surgery.  He has had surgery on a finger.      He does state that normally his appetite has been good.  His weight  has been stable.  Bowels function regularly.  He denies any urinary tract symptoms.      PHYSICAL EXAMINATION:   GENERAL:  On examination today, he is a pleasant 71-year-old male who is in some distress from his back but otherwise was feeling well.   VITAL SIGNS:  He is afebrile with temperature 36.9.  Blood pressure is 151/95 with a pulse of 88.   HEAD AND NECK:  Unremarkable.  There are no masses.  There is no thyroid enlargement.  There is no scleral icterus.   CHEST:  He has good air entry bilaterally.  There are no wheezes or creps.   HEART:  Sounds are normal.  He has normal S1, S2.  There are no murmurs.  He is in sinus rhythm.   ABDOMEN:  He has somewhat of a protuberant abdomen.  It was soft and nontender with no palpable masses.  There is no hepatosplenomegaly.  There were no groin hernias.      There was no peripheral edema.        On examination of his back he does have a large abscess which was basically sitting in the mid portion of back over the lower thoracic area.  He has a definite fluctuation and breakdown of the skin with multiple draining sinuses.  The area was approximately 8 cm x 8 cm in size.  It is quite tender.  Cultures were taken.      LABORATORY DATA:  His blood work reveals a hemoglobin of 14.4 with a white count of 14.5.  Electrolytes are normal.  Blood glucose is slightly elevated at 190.  His creatinine is normal.  CRP inflammation is 165 with a sed rate of 44.      ASSESSMENT AND PLAN:  I explained to Mr. Alexandre that he does have the abscess on his back.  I suspect he has had a longstanding sebaceous cyst, likely ruptured and became infected when he struck it against the door.  This does need to be opened.  I think he basically needs to have to the skin excised over top of this and completely deroofed.  This does need to be done in the operating room because of its size.  I explained that we would end up leaving the wound open.  Likely end up getting wound care involved.  Long  term-wise perhaps a wound VAC would be beneficial.  I explained that this will take a number of weeks to heal.  I did go over the procedure including the risk of anesthetic, bleeding and infection.      He is agreeable to having it done.  A consent was signed.  Arrangements are being made for him to have this done later today.  We will admit him into the hospital.  He will be kept n.p.o. with intravenous running and we will start him on some antibiotics.  Will plan to keep him overnight and hopefully home tomorrow.         KRYS JUÁREZ MD             D: 2017 13:11   T: 2017 13:53   MT: MARY ALICE      Name:     BENJAMIN SALGADO   MRN:      [id number]        Account:       [id number]   :      1945           Consult Date:  2017      Document: [id number]

## 2017-03-23 NOTE — ANESTHESIA POSTPROCEDURE EVALUATION
Patient: Trevor Alexandre    Procedure(s):  INCISION AND DRAINAGE and Debridement  ABSCESS ON BACK - Wound Class: I-Clean    Diagnosis:ABSCESS ON BACK  Diagnosis Additional Information: No value filed.    Anesthesia Type:  General    Note:  Anesthesia Post Evaluation    Patient location during evaluation: PACU, Floor and Bedside  Patient participation: Able to fully participate in evaluation  Level of consciousness: awake and alert  Pain management: adequate  Airway patency: patent  Cardiovascular status: acceptable  Respiratory status: acceptable  Hydration status: stable  PONV: none     Anesthetic complications: None          Last vitals:  Vitals:    03/22/17 1930 03/22/17 2104 03/23/17 0101   BP: (!) 103/45 110/63 111/68   Pulse: 98 89    Resp: 16  16   Temp: 99.8  F (37.7  C)  98.4  F (36.9  C)   SpO2:   96%         Electronically Signed By: Bryant Young MD  March 23, 2017  7:34 AM

## 2017-03-23 NOTE — PROVIDER NOTIFICATION
Dr. Dale notified of blood sugar elevation to 441.  Patient given 4 U of novolog per orders, blood sugar rechecked 30-45 minutes later and 441.  New orders per Dr. Dale to continue current blood sugar checks q 4 hour.  Administer 1x dose of novolog 10 units.  Plan to increased sliding scale to the higher scale in AM.  Administered Novolog as ordered.  Will update oncoming staff.

## 2017-03-23 NOTE — PLAN OF CARE
Face to face report given with opportunity to observe patient.    Report given to Yves RN and Tracy Massey RN  3/23/2017  4:21 PM

## 2017-03-23 NOTE — OP NOTE
DATE OF PROCEDURE:  03/22/2017       PREOPERATIVE DIAGNOSIS:  Large abscess secondary to infected sebaceous cyst, back.      POSTOPERATIVE DIAGNOSIS:  Large abscess secondary to infected sebaceous cyst, back.      PROCEDURE:  Incision and drainage and debridement of abscess back.      SURGEON:  Servando Dale MD      ANESTHETIC:  General.      BLOOD LOSS:  1000 mL.      SPECIMENS:  Aerobic and anaerobic cultures plus tissue abscess back.      COMPLICATIONS:  Nil.      INDICATIONS FOR PROCEDURE:  Mr. Trevor Alexandre is a 71-year-old male who has had a longstanding sebaceous cyst on his back.   Towards the end of February, he struck the area on a door and the cyst subsequently became infected.  [facility]  Unfortunately, despite this, it has gotten bigger and causing him more pain.  [facility] and sent to urgent care at the hospital.      On examination, he has a large 8 cm x 9 cm abscess on his back which is definitely fluctuant.  It was felt it should be drained in the operating room.      DESCRIPTION OF PROCEDURE:  The patient was prepped and brought to the operating room.  Under a general anesthetic with an endotracheal tube in place, he was placed in the left lateral position, taking care to protect all of the bony prominences.  He was taped and belted in position.  The back was then prepped with Betadine and draped in the usual fashion.  An appropriate timeout was carried out.  An incision was made into the abscess and a large amount of creamy pus and sebaceous material was returned.  I then made a circular incision around the area of the abscess.  There was obvious sebaceous cyst wall that was still present towards the left side of the incision.  The abscess did go quite deeply.  It was throughout the fatty tissue, did end up going down to the underlying fascia and also was undermining towards the right side, particularly on the  superior aspect.  I did eventually deroof all of this.  All of the necrotic fatty tissue was excised.  This was done with sharp dissection using a knife.  It was also done with sharp scissors.  Eventually, I was able to remove all of the frankly necrotic tissue.  As mentioned, there had been some undermining, particularly to the right side.  I did the deroof all of this.  There was fairly active bleeding.  There was one larger vessel in the depth of the wound, and I did suture ligate with 3-0 Vicryl.  Hemostasis was otherwise achieved with cautery.  Eventually, we were able to dry the wound up.  The wound was irrigated with saline.  The wound did measure 10 cm x 9 cm x 5 cm in depth.  Wound was packed with 6-inch Kerlix soaked in saline and a bulky dressing was applied.      He did tolerate procedure well.  He was returned to the recovery room in good condition.      At the end of the procedure, sponge, instrument and needle count was correct as reported by the nurses.      At the moment, the plan will be to bring him back to the operating room tomorrow for a further debridement.  Provided the wound looks clean, then he will likely need a repeat trip to the operating room on Friday for a wound VAC.  I would expect that he is going to be here for a few days.         KRYS JUÁREZ MD             D: 2017 17:30   T: 2017 21:44   MT: TD      Name:     BENJAMIN SALGADO   MRN:      [id number]        Account:        [id number]   :      1945           Procedure Date: 2017      Document: [id number]       cc: [facility]

## 2017-03-23 NOTE — BRIEF OP NOTE
[facility] Brief Operative Note    Pre-operative diagnosis: ABSCESS ON BACK   Post-operative diagnosis Abscess Back   Procedure: Procedure(s):  DRESSING CHANGE OF ABSCESS ON BACK - Wound Class: II-Clean Contaminated   Surgeon: Servando Dale MD   Assistants(s):    Estimated blood loss: None    Specimens: None   Findings: Wound very clean. No debridement necessary.

## 2017-03-23 NOTE — PLAN OF CARE
Face to face report given with opportunity to observe patient.    Report given to BOUBACAR Elizondo.     Nisha Naranjo   3/22/2017  11:07 PM

## 2017-03-23 NOTE — ANESTHESIA CARE TRANSFER NOTE
Patient: Trevor Alexandre    Procedure(s):  DRESSING CHANGE OF ABSCESS ON BACK - Wound Class: II-Clean Contaminated    Diagnosis: ABSCESS ON BACK  Diagnosis Additional Information: No value filed.    Anesthesia Type:   General, ETT, RSI     Note:  Airway :Nasal Cannula  Patient transferred to:PACU  Comments: Awake, denies pain. Restful, VSS      Vitals: (Last set prior to Anesthesia Care Transfer)    CRNA VITALS  3/23/2017 1149 - 3/23/2017 1224      3/23/2017             Resp Rate (observed): (!)  1    Resp Rate (set): 8                Electronically Signed By: CHARIS Bill CRNA  March 23, 2017  12:24 PM

## 2017-03-23 NOTE — PLAN OF CARE
Problem: Goal Outcome Summary  Goal: Goal Outcome Summary  Outcome: No Change  Patient did go down to OR for his wound debridement, patient tolerated procedure well, and is back in his room, patient denies pain and there is no evidence of break through bleeding noted to the surgical site, patient has been working with  to come up with a plan for discharge, patient is steady on his feet and is making his needs known well, patient was able to eat, with no difficulty, patient verbalizes his needs adequately and has reported absolutely no discomfort. Patient understands there may be a wound vac placed tomorrow.

## 2017-03-23 NOTE — PROGRESS NOTES
POD # 1  Doing well, much better pain than pre-op, able to get some sleep.  Afebrile, /63, HR 92  Blood sugars high at midnight but down to 200 this morning with insulin  Serous drainage on wound.  Heart and lungs clear.  Lab - Hgb 9.5, WBC 14.2, lytes OK           Gram positive cocci in pairs and clusters on gram stain  A - doing well        Acute blood loss anemia due to expected loss of blood at time of surgery  P - back to OR today for dressing change possible debridement, possible wound vac tomorrow        Wound care consult       Continue antibiotics

## 2017-03-23 NOTE — ANESTHESIA PREPROCEDURE EVALUATION
Anesthesia Evaluation     . Pt has had prior anesthetic.     No history of anesthetic complications          ROS/MED HX    ENT/Pulmonary:     (+)tobacco use, Past use quit 2002 packs/day  , . .    Neurologic:       Cardiovascular:     (+) Dyslipidemia, hypertension-range: not on beta blocker, ---. : . . . :. .       METS/Exercise Tolerance:     Hematologic:     (+) Anemia, -      Musculoskeletal:   (+) arthritis, , , -       GI/Hepatic:         Renal/Genitourinary:         Endo:     (+) type II DM Last HgA1c: 8.0 date: 3/23/2017 .      Psychiatric:         Infectious Disease:         Malignancy:         Other:                     Physical Exam  Normal systems: cardiovascular, pulmonary and dental    Airway   Mallampati: IV  TM distance: >3 FB  Neck ROM: full    Dental     Cardiovascular   Rhythm and rate: regular and normal      Pulmonary    breath sounds clear to auscultation                    Anesthesia Plan      History & Physical Review  History and physical reviewed and following examination; no interval change.    ASA Status:  3 emergent.    NPO Status:  > 8 hours    Plan for General, ETT and RSI with Intravenous and Propofol induction. Maintenance will be Balanced.    PONV prophylaxis:  Ondansetron (or other 5HT-3) and Dexamethasone or Solumedrol  Additional equipment: Videolaryngoscope      Postoperative Care  Postoperative pain management:  IV analgesics and Oral pain medications.      Consents  Anesthetic plan, risks, benefits and alternatives discussed with:  Patient..                          .

## 2017-03-23 NOTE — OP NOTE
DATE OF PROCEDURE:  03/23/2017      PREOPERATIVE DIAGNOSIS:  Large abscess, back.      POSTOPERATIVE DIAGNOSIS:  Large abscess, back.      PROCEDURE:  Dressing change, large abscess, back.      SURGEON:  Servando Dale MD      ANESTHETIC:  General.      BLOOD LOSS:  None.      SPECIMENS:  None.      COMPLICATIONS:  None.      INDICATIONS:  Mr. Trevor Alexandre is a 71-year-old male who returns to the operating room today for a dressing change of a large abscess on his back.  He presented to urgent care yesterday with the abscess.  This was taken to the operating room late yesterday afternoon and a debridement of a rather large abscess was carried out.  Post-procedure he has done well.  He has minimal pain.  He returns today for a dressing change.      DESCRIPTION OF PROCEDURE:  The patient was prepped and brought to the operating room.  Under general anesthetic with endotracheal tube in place, he was placed in the left lateral position, taking care to protect the bony prominences.  The dressing was taken down.  There was a bit of serosanguineous drainage on the dressing.  There was no cellulitis.  The wound was then prepped with Betadine and draped in the usual fashion.  An appropriate timeout was carried out.  On examination of the wound, it looks quite clean.  There really was no need for any debridement.  This does go down to muscle, but did not involve muscle.  Basically it is in the midline.  There were a couple small areas of oozing that I did control with cautery.  Wound was then irrigated with saline.  It was packed with a 6-inch Kerlix soaked in saline and a bulky dressing was applied.      He tolerated the procedure well.  He was returned to the recovery room in good condition.  At the end of procedure, sponge, instrument and needle count was correct as reported by the nurses.      At the moment, the plan will be to return him to the operating room tomorrow for another dressing change and likely application  of a wound VAC.  Dr. De Jesus has kindly agreed to do this in my absence.         KRYS JUÁREZ MD             D: 2017 12:23   T: 2017 13:01   MT: AIRAM      Name:     BENJAMIN SALGADO   MRN:      [id number]        Account:        [id number]   :      1945           Procedure Date: 2017      Document: [id number]

## 2017-03-23 NOTE — PLAN OF CARE
Problem: Patient Goal: Social Work Focus  Goal: 1. Patient Goal: Social Work Focus  Assessment completed via flow sheet.     Irving is sitting on the edge of the bed. [facility] [place]. His dentist is Dr Santizo in [place]. He does not see an eye care professional but does do the diabetic retinopathy at the [facility]. He does not have a POA or a healthcare directive but has the paperwork at home. [facility] and [facility] in [place] for immediate needs. He is a  and the [facility] has been contacted.     He lives alone at home, states he feels safe and that his home is well maintained. He performs his own self cares, manages his medications and appointment schedule. He does his own shopping and food prep, he drives and has reliable transportation. He is retired from [employer].     He does not have any trouble sleeping or any mood concerns. He does not smoke or consume alcohol. His chaparro is important to him but he does not want chaparro support at this time. This illness has caused him stressed. He enjoys being on the houseboat in the summer, and walking.    The plan right now would be to have Irving see wound care here at [facility] 3X weekly, M,W,F. Will remain available for discharge planning.

## 2017-03-24 ENCOUNTER — APPOINTMENT (OUTPATIENT)
Dept: WOUND CARE | Facility: HOSPITAL | Age: 72
End: 2017-03-24

## 2017-03-24 ENCOUNTER — ANESTHESIA (OUTPATIENT)
Dept: SURGERY | Facility: HOSPITAL | Age: 72
DRG: 571 | End: 2017-03-24
Payer: COMMERCIAL

## 2017-03-24 ENCOUNTER — ANESTHESIA EVENT (OUTPATIENT)
Dept: SURGERY | Facility: HOSPITAL | Age: 72
DRG: 571 | End: 2017-03-24
Payer: COMMERCIAL

## 2017-03-24 ENCOUNTER — TRANSFERRED RECORDS (OUTPATIENT)
Dept: HEALTH INFORMATION MANAGEMENT | Facility: HOSPITAL | Age: 72
End: 2017-03-24

## 2017-03-24 VITALS
OXYGEN SATURATION: 99 % | DIASTOLIC BLOOD PRESSURE: 69 MMHG | TEMPERATURE: 98.7 F | BODY MASS INDEX: 30.31 KG/M2 | HEIGHT: 71 IN | SYSTOLIC BLOOD PRESSURE: 135 MMHG | RESPIRATION RATE: 20 BRPM | WEIGHT: 216.49 LBS | HEART RATE: 89 BPM

## 2017-03-24 LAB
ANION GAP SERPL CALCULATED.3IONS-SCNC: 5 MMOL/L (ref 3–14)
BACTERIA SPEC CULT: NORMAL
BASOPHILS # BLD AUTO: 0 10E9/L (ref 0–0.2)
BASOPHILS NFR BLD AUTO: 0.3 %
BUN SERPL-MCNC: 9 MG/DL (ref 7–30)
CALCIUM SERPL-MCNC: 8.1 MG/DL (ref 8.5–10.1)
CHLORIDE SERPL-SCNC: 111 MMOL/L (ref 94–109)
CO2 SERPL-SCNC: 25 MMOL/L (ref 20–32)
CREAT SERPL-MCNC: 0.65 MG/DL (ref 0.66–1.25)
DIFFERENTIAL METHOD BLD: ABNORMAL
EOSINOPHIL # BLD AUTO: 0.1 10E9/L (ref 0–0.7)
EOSINOPHIL NFR BLD AUTO: 0.9 %
ERYTHROCYTE [DISTWIDTH] IN BLOOD BY AUTOMATED COUNT: 12.6 % (ref 10–15)
GFR SERPL CREATININE-BSD FRML MDRD: ABNORMAL ML/MIN/1.7M2
GLUCOSE BLDC GLUCOMTR-MCNC: 170 MG/DL (ref 70–99)
GLUCOSE BLDC GLUCOMTR-MCNC: 193 MG/DL (ref 70–99)
GLUCOSE BLDC GLUCOMTR-MCNC: 195 MG/DL (ref 70–99)
GLUCOSE BLDC GLUCOMTR-MCNC: 197 MG/DL (ref 70–99)
GLUCOSE BLDC GLUCOMTR-MCNC: 234 MG/DL (ref 70–99)
GLUCOSE SERPL-MCNC: 220 MG/DL (ref 70–99)
HCT VFR BLD AUTO: 25.5 % (ref 40–53)
HGB BLD-MCNC: 8.9 G/DL (ref 13.3–17.7)
IMM GRANULOCYTES # BLD: 0.5 10E9/L (ref 0–0.4)
IMM GRANULOCYTES NFR BLD: 4 %
LYMPHOCYTES # BLD AUTO: 2.5 10E9/L (ref 0.8–5.3)
LYMPHOCYTES NFR BLD AUTO: 22.1 %
MCH RBC QN AUTO: 30.6 PG (ref 26.5–33)
MCHC RBC AUTO-ENTMCNC: 34.9 G/DL (ref 31.5–36.5)
MCV RBC AUTO: 88 FL (ref 78–100)
MICRO REPORT STATUS: NORMAL
MONOCYTES # BLD AUTO: 0.9 10E9/L (ref 0–1.3)
MONOCYTES NFR BLD AUTO: 7.8 %
NEUTROPHILS # BLD AUTO: 7.5 10E9/L (ref 1.6–8.3)
NEUTROPHILS NFR BLD AUTO: 64.9 %
NRBC # BLD AUTO: 0 10*3/UL
NRBC BLD AUTO-RTO: 0 /100
PLATELET # BLD AUTO: 311 10E9/L (ref 150–450)
POTASSIUM SERPL-SCNC: 4.1 MMOL/L (ref 3.4–5.3)
RBC # BLD AUTO: 2.91 10E12/L (ref 4.4–5.9)
SODIUM SERPL-SCNC: 141 MMOL/L (ref 133–144)
SPECIMEN SOURCE: NORMAL
WBC # BLD AUTO: 11.5 10E9/L (ref 4–11)

## 2017-03-24 PROCEDURE — 36415 COLL VENOUS BLD VENIPUNCTURE: CPT | Performed by: SURGERY

## 2017-03-24 PROCEDURE — 37000009 ZZH ANESTHESIA TECHNICAL FEE, EACH ADDTL 15 MIN: Performed by: SURGERY

## 2017-03-24 PROCEDURE — 25000128 H RX IP 250 OP 636: Performed by: SURGERY

## 2017-03-24 PROCEDURE — 2W15X6Z COMPRESSION OF BACK USING PRESSURE DRESSING: ICD-10-PCS | Performed by: SURGERY

## 2017-03-24 PROCEDURE — A9270 NON-COVERED ITEM OR SERVICE: HCPCS | Mod: GY | Performed by: SURGERY

## 2017-03-24 PROCEDURE — 27110028 ZZH OR GENERAL SUPPLY NON-STERILE: Performed by: SURGERY

## 2017-03-24 PROCEDURE — 85025 COMPLETE CBC W/AUTO DIFF WBC: CPT | Performed by: SURGERY

## 2017-03-24 PROCEDURE — 15852 DRESSING CHANGE NOT FOR BURN: CPT | Performed by: ANESTHESIOLOGY

## 2017-03-24 PROCEDURE — 40000305 ZZH STATISTIC PRE PROC ASSESS I: Performed by: SURGERY

## 2017-03-24 PROCEDURE — 97605 NEG PRS WND THER DME<=50SQCM: CPT | Performed by: SURGERY

## 2017-03-24 PROCEDURE — 01999 UNLISTED ANES PROCEDURE: CPT | Performed by: NURSE ANESTHETIST, CERTIFIED REGISTERED

## 2017-03-24 PROCEDURE — 00000146 ZZHCL STATISTIC GLUCOSE BY METER IP

## 2017-03-24 PROCEDURE — 25800025 ZZH RX 258: Performed by: NURSE ANESTHETIST, CERTIFIED REGISTERED

## 2017-03-24 PROCEDURE — 71000014 ZZH RECOVERY PHASE 1 LEVEL 2 FIRST HR: Performed by: SURGERY

## 2017-03-24 PROCEDURE — 80048 BASIC METABOLIC PNL TOTAL CA: CPT | Performed by: SURGERY

## 2017-03-24 PROCEDURE — 25000125 ZZHC RX 250: Performed by: NURSE PRACTITIONER

## 2017-03-24 PROCEDURE — 27210794 ZZH OR GENERAL SUPPLY STERILE: Performed by: SURGERY

## 2017-03-24 PROCEDURE — 25000132 ZZH RX MED GY IP 250 OP 250 PS 637: Mod: GY | Performed by: SURGERY

## 2017-03-24 PROCEDURE — 36000052 ZZH SURGERY LEVEL 2 EA 15 ADDTL MIN: Performed by: SURGERY

## 2017-03-24 PROCEDURE — 36000050 ZZH SURGERY LEVEL 2 1ST 30 MIN: Performed by: SURGERY

## 2017-03-24 PROCEDURE — 25000128 H RX IP 250 OP 636: Performed by: NURSE ANESTHETIST, CERTIFIED REGISTERED

## 2017-03-24 PROCEDURE — 25000125 ZZHC RX 250: Performed by: NURSE ANESTHETIST, CERTIFIED REGISTERED

## 2017-03-24 PROCEDURE — 37000008 ZZH ANESTHESIA TECHNICAL FEE, 1ST 30 MIN: Performed by: SURGERY

## 2017-03-24 PROCEDURE — S0077 INJECTION, CLINDAMYCIN PHOSP: HCPCS | Performed by: NURSE PRACTITIONER

## 2017-03-24 RX ORDER — ONDANSETRON 2 MG/ML
4 INJECTION INTRAMUSCULAR; INTRAVENOUS EVERY 30 MIN PRN
Status: DISCONTINUED | OUTPATIENT
Start: 2017-03-24 | End: 2017-03-24 | Stop reason: HOSPADM

## 2017-03-24 RX ORDER — SODIUM CHLORIDE, SODIUM LACTATE, POTASSIUM CHLORIDE, CALCIUM CHLORIDE 600; 310; 30; 20 MG/100ML; MG/100ML; MG/100ML; MG/100ML
INJECTION, SOLUTION INTRAVENOUS CONTINUOUS PRN
Status: DISCONTINUED | OUTPATIENT
Start: 2017-03-24 | End: 2017-03-24

## 2017-03-24 RX ORDER — OXYCODONE HYDROCHLORIDE 5 MG/1
5-10 TABLET ORAL EVERY 6 HOURS PRN
Qty: 30 TABLET | Refills: 0 | Status: SHIPPED | OUTPATIENT
Start: 2017-03-24 | End: 2017-06-07

## 2017-03-24 RX ORDER — FENTANYL CITRATE 50 UG/ML
25-50 INJECTION, SOLUTION INTRAMUSCULAR; INTRAVENOUS
Status: DISCONTINUED | OUTPATIENT
Start: 2017-03-24 | End: 2017-03-24 | Stop reason: HOSPADM

## 2017-03-24 RX ORDER — HYDROMORPHONE HYDROCHLORIDE 1 MG/ML
.3-.5 INJECTION, SOLUTION INTRAMUSCULAR; INTRAVENOUS; SUBCUTANEOUS EVERY 5 MIN PRN
Status: DISCONTINUED | OUTPATIENT
Start: 2017-03-24 | End: 2017-03-24 | Stop reason: HOSPADM

## 2017-03-24 RX ORDER — SODIUM CHLORIDE, SODIUM LACTATE, POTASSIUM CHLORIDE, CALCIUM CHLORIDE 600; 310; 30; 20 MG/100ML; MG/100ML; MG/100ML; MG/100ML
INJECTION, SOLUTION INTRAVENOUS CONTINUOUS
Status: DISCONTINUED | OUTPATIENT
Start: 2017-03-24 | End: 2017-03-24 | Stop reason: HOSPADM

## 2017-03-24 RX ORDER — OXYCODONE HYDROCHLORIDE 5 MG/1
5-10 TABLET ORAL EVERY 6 HOURS PRN
Status: DISCONTINUED | OUTPATIENT
Start: 2017-03-24 | End: 2017-03-24 | Stop reason: HOSPADM

## 2017-03-24 RX ORDER — KETOROLAC TROMETHAMINE 30 MG/ML
15 INJECTION, SOLUTION INTRAMUSCULAR; INTRAVENOUS
Status: DISCONTINUED | OUTPATIENT
Start: 2017-03-24 | End: 2017-03-24 | Stop reason: HOSPADM

## 2017-03-24 RX ORDER — MEPERIDINE HYDROCHLORIDE 25 MG/ML
12.5 INJECTION INTRAMUSCULAR; INTRAVENOUS; SUBCUTANEOUS EVERY 5 MIN PRN
Status: DISCONTINUED | OUTPATIENT
Start: 2017-03-24 | End: 2017-03-24 | Stop reason: HOSPADM

## 2017-03-24 RX ORDER — DEXAMETHASONE SODIUM PHOSPHATE 4 MG/ML
4 INJECTION, SOLUTION INTRA-ARTICULAR; INTRALESIONAL; INTRAMUSCULAR; INTRAVENOUS; SOFT TISSUE
Status: DISCONTINUED | OUTPATIENT
Start: 2017-03-24 | End: 2017-03-24 | Stop reason: HOSPADM

## 2017-03-24 RX ORDER — ALBUTEROL SULFATE 0.83 MG/ML
2.5 SOLUTION RESPIRATORY (INHALATION) EVERY 4 HOURS PRN
Status: DISCONTINUED | OUTPATIENT
Start: 2017-03-24 | End: 2017-03-24 | Stop reason: HOSPADM

## 2017-03-24 RX ORDER — FENTANYL CITRATE 50 UG/ML
INJECTION, SOLUTION INTRAMUSCULAR; INTRAVENOUS PRN
Status: DISCONTINUED | OUTPATIENT
Start: 2017-03-24 | End: 2017-03-24

## 2017-03-24 RX ORDER — LABETALOL HYDROCHLORIDE 5 MG/ML
10 INJECTION, SOLUTION INTRAVENOUS
Status: DISCONTINUED | OUTPATIENT
Start: 2017-03-24 | End: 2017-03-24 | Stop reason: HOSPADM

## 2017-03-24 RX ORDER — HYDRALAZINE HYDROCHLORIDE 20 MG/ML
2.5-5 INJECTION INTRAMUSCULAR; INTRAVENOUS EVERY 10 MIN PRN
Status: DISCONTINUED | OUTPATIENT
Start: 2017-03-24 | End: 2017-03-24 | Stop reason: HOSPADM

## 2017-03-24 RX ORDER — ONDANSETRON 4 MG/1
4 TABLET, ORALLY DISINTEGRATING ORAL EVERY 30 MIN PRN
Status: DISCONTINUED | OUTPATIENT
Start: 2017-03-24 | End: 2017-03-24 | Stop reason: HOSPADM

## 2017-03-24 RX ADMIN — OXYCODONE HYDROCHLORIDE 5 MG: 5 TABLET ORAL at 09:16

## 2017-03-24 RX ADMIN — HYDROMORPHONE HYDROCHLORIDE 0.3 MG: 1 INJECTION, SOLUTION INTRAMUSCULAR; INTRAVENOUS; SUBCUTANEOUS at 02:33

## 2017-03-24 RX ADMIN — SODIUM CHLORIDE, POTASSIUM CHLORIDE, SODIUM LACTATE AND CALCIUM CHLORIDE: 600; 310; 30; 20 INJECTION, SOLUTION INTRAVENOUS at 07:29

## 2017-03-24 RX ADMIN — FENTANYL CITRATE 25 MCG: 50 INJECTION, SOLUTION INTRAMUSCULAR; INTRAVENOUS at 07:37

## 2017-03-24 RX ADMIN — FENTANYL CITRATE 25 MCG: 50 INJECTION, SOLUTION INTRAMUSCULAR; INTRAVENOUS at 07:30

## 2017-03-24 RX ADMIN — INSULIN ASPART 1 UNITS: 100 INJECTION, SOLUTION INTRAVENOUS; SUBCUTANEOUS at 05:51

## 2017-03-24 RX ADMIN — CLINDAMYCIN PHOSPHATE 300 MG: 6 INJECTION, SOLUTION INTRAVENOUS at 02:20

## 2017-03-24 RX ADMIN — METFORMIN HYDROCHLORIDE 2000 MG: 750 TABLET, EXTENDED RELEASE ORAL at 10:30

## 2017-03-24 RX ADMIN — INSULIN ASPART 1 UNITS: 100 INJECTION, SOLUTION INTRAVENOUS; SUBCUTANEOUS at 09:23

## 2017-03-24 RX ADMIN — OXYCODONE HYDROCHLORIDE 5 MG: 5 TABLET ORAL at 15:16

## 2017-03-24 RX ADMIN — OXYCODONE HYDROCHLORIDE 5 MG: 5 TABLET ORAL at 11:59

## 2017-03-24 RX ADMIN — MIDAZOLAM HYDROCHLORIDE 2 MG: 1 INJECTION, SOLUTION INTRAMUSCULAR; INTRAVENOUS at 07:30

## 2017-03-24 RX ADMIN — FENTANYL CITRATE 25 MCG: 50 INJECTION, SOLUTION INTRAMUSCULAR; INTRAVENOUS at 07:52

## 2017-03-24 RX ADMIN — INSULIN ASPART 1 UNITS: 100 INJECTION, SOLUTION INTRAVENOUS; SUBCUTANEOUS at 02:15

## 2017-03-24 RX ADMIN — INSULIN ASPART 1 UNITS: 100 INJECTION, SOLUTION INTRAVENOUS; SUBCUTANEOUS at 12:28

## 2017-03-24 RX ADMIN — FENTANYL CITRATE 25 MCG: 50 INJECTION, SOLUTION INTRAMUSCULAR; INTRAVENOUS at 07:45

## 2017-03-24 ASSESSMENT — LIFESTYLE VARIABLES: TOBACCO_USE: 1

## 2017-03-24 NOTE — ANESTHESIA POSTPROCEDURE EVALUATION
Patient: Trevor Alexandre    Procedure(s):  DRESSING CHANGE OF ABSCESS ON BACK - Wound Class: II-Clean Contaminated    Diagnosis:ABSCESS ON BACK  Diagnosis Additional Information: No value filed.    Anesthesia Type:  General, ETT, RSI    Note:  Anesthesia Post Evaluation    Patient location during evaluation: Bedside, PACU and Floor  Patient participation: Able to fully participate in evaluation  Level of consciousness: awake and alert  Pain management: adequate  Airway patency: patent  Cardiovascular status: acceptable  Respiratory status: acceptable  Hydration status: stable  PONV: none     Anesthetic complications: None          Last vitals:  Vitals:    03/24/17 0840 03/24/17 0841 03/24/17 0900   BP: 112/85  137/65   Pulse:      Resp: 18  18   Temp:   97.6  F (36.4  C)   SpO2: 100% 100% 99%         Electronically Signed By: Bryant Young MD  March 24, 2017  9:30 AM

## 2017-03-24 NOTE — PLAN OF CARE
Problem: Goal Outcome Summary  Goal: Goal Outcome Summary  Outcome: No Change  Patient did have his wound vac placed, wound care RN is going to come up momentarilly to educate patient on his wound vac, patient has reported some mild pain mostly to his knees and hands, patient has taken oral pain medication to keep ahead of his discomfort, patient is now reporting surgical site discomfort 1.5/10 and is requesting pain medication at this time, patient has been ambulating in his room and has had an adequate appetite, patient pleasant and makes his needs known.

## 2017-03-24 NOTE — DISCHARGE INSTRUCTIONS
Wound Care:    Bring canister and dressing kit to each wound center visit.  Please come to admitting at the North entrance to the hospital 15 min prior to your appointment.  Contact [employer] with any questions related to the actual equipment - the phone number is on the front of the wound vac  The Wound Center is open Monday - Friday during normal business hours for questions ([phone] option #3)  If wound vac fails to operate for > 2 hours - seek medical care immediately for a damp to dry dressing

## 2017-03-24 NOTE — BRIEF OP NOTE
[facility] - Brief Operative Note    Pre-operative diagnosis: Back abscess   Post-operative diagnosis Back abscess   Procedure: Dressing change, wound vac applicatoin   Surgeon: Tahir De Jesus DO   Anesthesia: Other    Estimated blood loss: 0   Blood transfusion: No transfusion was given during surgery   Drains: 0   Specimens: None   Findings: 10.2x7.9x4.5 wound mid back without erythema, necrosis or purulence   Complications: None   Condition: Stable   Comments: Details included in dictated operative note.

## 2017-03-24 NOTE — CONSULTS
Pt seen in OR during dressing change by Dr De Jesus for wound vac placement.  VA insurance for coverage of the wound vac at home is pending.  There is an approval from the [facility] for his wound center visits; documents were faxed to the VA for approval of the DME (equipment and dressing kits)    Pt wound was measured by Dr De Jesus.  The wound bed is red, moist with scattered areas from cautery.  The hair around the wound was clipped and prepped with a skin barrier wipe.  Draping was placed under the bridge foam. One piece of black foam was placed into the wound in addition to a bridge to the side and a disc landing.   Draping applied and seal check confirmed along with wound vac settings of 125, low, continuous.

## 2017-03-24 NOTE — ANESTHESIA CARE TRANSFER NOTE
Patient: Trevor Alexandre    Procedure(s):  DRESSING CHANGE ON BACK WOUND VAC PLACEMENT.  L10.2 mm X W7.9mm X D4.5mm - Wound Class: II-Clean Contaminated    Diagnosis: BACK ABSCESS  Diagnosis Additional Information: No value filed.    Anesthesia Type:   MAC     Note:  Airway :Nasal Cannula  Patient transferred to:PACU        Vitals: (Last set prior to Anesthesia Care Transfer)    CRNA VITALS  3/24/2017 0738 - 3/24/2017 0814      3/24/2017             Resp Rate (set): 8                Electronically Signed By: CHARIS Conley CRNA  March 24, 2017  8:14 AM

## 2017-03-24 NOTE — PLAN OF CARE
Problem: Patient Goal: Social Work Focus  Goal: 1. Patient Goal: Social Work Focus  Participated in Care Rounds, met with Irving. Received phone call from Randolph at the [facility] who stated that she had received a message from Olivia MSP wound care nurse who stated that Jayda from [facility] regarding the Wound Vac from [facility]. The PO has been faxed and CARMEN will contact Wound Care at [facility]. Awaiting a call from wound care at [facility] to confirm. Irving has updated on the situation.

## 2017-03-24 NOTE — PLAN OF CARE
Face to face report given with opportunity to observe patient.    Report given to Luly Massey RN  3/24/2017  3:52 PM

## 2017-03-24 NOTE — OP NOTE
DATE OF SERVICE:  2017      PREOPERATIVE DIAGNOSIS:  Back abscess.      POSTOPERATIVE DIAGNOSIS:  Back abscess.      PROCEDURE:  Dressing change and wound VAC application.      INDICATION:  Trevor Alexandre is a 71-year-old gentleman postop day 2 from wide debridement of chronic back abscess secondary to infected sebaceous cyst with a dressing change yesterday showing resolving inflammation who today is having a dressing change and wound VAC application.      WOUND TYPE:  Three, contaminated.      DRAINS:  Zero.      SURGEON:  Tahir De Jesus DO      DESCRIPTION OF PROCEDURE:  The patient was brought into the operating room and placed prone on the operating table.  After monitored attended anesthesia was administered, the dressing was removed and the wound was prepped and draped in the usual sterile fashion.  After preprocedural pause identifying the patient and procedure, position the wound then was measured to be 10.2 x 7.9 x 4.5 cm.  Inspection of the wound revealed healthy tissue at the base.  There was no evidence of continued infection.  There is no inflammation, purulence, necrosis or erythema.  One large black wound sponge was cut to size and fit within the wound.  The skin around the wound then was cleaned and prepped and a bridge then was created from the wound to the left hip.  Plastic was applied over the sponges connected to suction, which maintained 125 mmHg continuous with no signs of leak.  The patient tolerated the procedure well and was taken to postanesthesia care unit.         TAHIR DE JESUS DO             D: 2017 08:13   T: 2017 08:54   MT: LIANA      Name:     TREVOR ALEXANDRE   MRN:      [id number]        Account:        [id number]   :      1945           Procedure Date: 2017      Document: [id number]

## 2017-03-24 NOTE — ANESTHESIA PREPROCEDURE EVALUATION
Anesthesia Evaluation     . Pt has had prior anesthetic.     No history of anesthetic complications          ROS/MED HX    ENT/Pulmonary:     (+)tobacco use, Past use Quit 2002 packs/day  , . .    Neurologic:  - neg neurologic ROS     Cardiovascular:     (+) Dyslipidemia, hypertension-range: not on beta blocker, ---. : . . . :. .       METS/Exercise Tolerance:     Hematologic:     (+) Anemia, -      Musculoskeletal:   (+) arthritis, , , -       GI/Hepatic:  - neg GI/hepatic ROS       Renal/Genitourinary:  - ROS Renal section negative       Endo:     (+) type II DM Last HgA1c: 8.0 date: 3/23/2017 .      Psychiatric:  - neg psychiatric ROS       Infectious Disease:  - neg infectious disease ROS       Malignancy:      - no malignancy   Other:    - neg other ROS                 Physical Exam  Normal systems: cardiovascular, pulmonary and dental    Airway   Mallampati: IV  TM distance: >3 FB  Neck ROM: full    Dental     Cardiovascular   Rhythm and rate: regular and normal      Pulmonary    breath sounds clear to auscultation                    Anesthesia Plan      History & Physical Review  History and physical reviewed and following examination; no interval change.    ASA Status:  2 emergent.    NPO Status:  > 8 hours    Plan for MAC with Intravenous and Propofol induction. Maintenance will be TIVA.  Reason for MAC:  Deep or markedly invasive procedure (G8)  PONV prophylaxis:  Ondansetron (or other 5HT-3) and Dexamethasone or Solumedrol       Postoperative Care  Postoperative pain management:  IV analgesics and Oral pain medications.      Consents  Anesthetic plan, risks, benefits and alternatives discussed with:  Patient..                          .

## 2017-03-24 NOTE — PROGRESS NOTES
Delivered wound vac for home use - [employer] authorization forms signed and faxed to them.      Wound vac education provided including:  [employer] contact number, battery management including use of electrical cord, changing canister, monitoring canister for drainage, if canister fills with blood seek immediate medical attention, alarms, leaks, how to drape for leaks, use of manual, return of equipment.  Pt demonstrated canister change. All questions answered and pt states basic understanding.    Instructed pt that if vac fails to operate for > 2 hours he needs to seek medical care immediately for a damp to dry dressing as patient cannot do this independently and lives alone.  Dr. De Jesus present at end of visit and went over his discharge instructions.

## 2017-03-24 NOTE — PLAN OF CARE
Problem: Goal Outcome Summary  Goal: Goal Outcome Summary  Outcome: Improving  Patient continues to deny any pain.  Surgical site on back is covered with a dressing.  Dressing clean dry and intact.  Blood sugar is observed q4 hours.  Blood sugar elevated 331 covered with sliding scale.  Patient is currently sitting with family.  Ate 100% of his meal this shift.  Patient is on regular diet, however encouraged to avoid foods high in carbs, patient declined to use syrup on pancake per nurse education.  Patient also educated on the signs and symptoms of hyperglycemia.  Plan to continue to observe.     Face to face report given with opportunity to observe patient.    Report given to BOUBACAR Sanders.     Nisha Naranjo   3/23/2017  11:58 PM

## 2017-03-24 NOTE — PLAN OF CARE
Patient down to surgery at this time. Report called to Darby in SDS. Patient has been NPO since midnight. Up independently to bathroom. Chart and consent with patient.

## 2017-03-24 NOTE — PLAN OF CARE
Face to face report given with opportunity to observe patient.    Report given to Bibi Santizo   3/24/2017  7:15 AM

## 2017-03-25 NOTE — PROGRESS NOTES
Name: Trevor Alexandre    MRN#: [id number]    Reason for Hospitalization: Abscess of back [L02.212]    Discharge Date: 3/24/2017    Patient / Family response to discharge plan: agreeable    Follow-Up Appt: Future Appointments  Date Time Provider Department Center   3/27/2017 8:00 AM HI WOUND CARE [facility]   3/29/2017 1:00 PM HI WOUND CARE [facility]   3/31/2017 3:00 PM HI WOUND CARE [facility]   4/3/2017 2:00 PM HI WOUND CARE [facility]   4/5/2017 2:00 PM HI WOUND CARE [facility]   4/7/2017 2:00 PM HI WOUND CARE [facility]   4/11/2017 11:30 AM Tahir De Jesus DO [facility]       Other Providers (Care Coordinator, County Services, PCA services etc): No    Discharge Disposition: home, Irving has a son and a daughter that live near him in [place] who will be available if he has any needs.    Kisha Pruett

## 2017-03-25 NOTE — PLAN OF CARE
Problem: Goal Outcome Summary  Goal: Goal Outcome Summary  Outcome: Adequate for Discharge Date Met:  03/24/17  Patient discharged at 1634 via wheel chair accompanied by son in law and staff. Prescriptions sent with patient to fill . All belongings sent with patient.      Discharge instructions reviewed with patient. Listed belongings gathered and returned to patient.     Patient discharged to home.      Core Measures and Vaccines  Core Measures applicable during stay: No.  Pneumonia and Influenza given prior to discharge, if indicated: patient already received.     Surgical Patient   Surgical Procedures during stay: I & D of back abscess.  Did patient receive discharge instruction on wound care and recognition of infection symptoms? Yes     MISC  Follow up appointment made:  Yes  Home and hospital aquired medications returned to patient: N/A  Patient reports pain was well managed at discharge: Yes     Patient rated pain less than 1/10 at discharge. Wound vac was in place. Instructed by surgeon to go to urgent care if wound vac happens to dislodge.

## 2017-03-27 ENCOUNTER — HOSPITAL ENCOUNTER (OUTPATIENT)
Dept: WOUND CARE | Facility: HOSPITAL | Age: 72
Discharge: HOME | End: 2017-03-27
Admitting: FAMILY MEDICINE
Payer: COMMERCIAL

## 2017-03-27 VITALS
TEMPERATURE: 98.6 F | WEIGHT: 216.1 LBS | HEIGHT: 70 IN | DIASTOLIC BLOOD PRESSURE: 84 MMHG | SYSTOLIC BLOOD PRESSURE: 153 MMHG | BODY MASS INDEX: 30.94 KG/M2 | HEART RATE: 86 BPM

## 2017-03-27 PROBLEM — S21.209A OPEN WOUND OF BACK: Status: ACTIVE | Noted: 2017-03-23

## 2017-03-27 LAB
BACTERIA SPEC CULT: ABNORMAL
MICRO REPORT STATUS: ABNORMAL
SPECIMEN SOURCE: ABNORMAL

## 2017-03-27 PROCEDURE — 97606 NEG PRS WND THER DME>50 SQCM: CPT

## 2017-03-27 PROCEDURE — 97605 NEG PRS WND THER DME<=50SQCM: CPT

## 2017-03-27 NOTE — ANESTHESIA POSTPROCEDURE EVALUATION
Patient: Trevor Alexandre    Procedure(s):  DRESSING CHANGE ON BACK WOUND VAC PLACEMENT.  L10.2 mm X W7.9mm X D4.5mm - Wound Class: II-Clean Contaminated    Diagnosis:BACK ABSCESS  Diagnosis Additional Information: No value filed.    Anesthesia Type:  MAC    Note:  Anesthesia Post Evaluation    Patient location during evaluation: PACU, Floor and Bedside  Patient participation: Able to fully participate in evaluation  Level of consciousness: awake and alert  Pain management: adequate  Airway patency: patent  Cardiovascular status: acceptable  Respiratory status: acceptable  Hydration status: stable  PONV: none     Anesthetic complications: None          Last vitals:  Vitals:    03/24/17 0841 03/24/17 0900 03/24/17 1608   BP:  137/65 135/69   Pulse:      Resp:  18 20   Temp:  97.6  F (36.4  C) 98.7  F (37.1  C)   SpO2: 100% 99% 99%         Electronically Signed By: Bryant Young MD  March 27, 2017  6:38 AM

## 2017-03-27 NOTE — PROGRESS NOTES
Trevor Alexandre, 1945  Chief Complaint   Patient presents with     WOUND CARE     Back abscess/cyst       Patient Active Problem List   Diagnosis     Abscess of back     Open wound of back       Concerns/Comments since last visits: Trevor is here for reevaluation and tx of back abscess secondary to cyst infection. Per Trevor he has had a long standing quarter sized cyst to his back that in February became infected. This was debrided 3/22/2017 and a wound vac was placed on 3/24. C/O pain to distal edge of wound but is tolerable. Wound vac was gurgling upon his arrival but has good seal.   Health hx, medications, and allergies reviewed. Has been taking Norco for pain control. Has completed PO abx tx.    Objective:      03/27/17 0900   Wound 03/27/17 Back   Date First Assessed/Time First Assessed: 03/27/17 0810   Location: Back   Site Assessment Red;Granulation tissue  (Areas of cautery)   Raisa-wound Assessment Induration;Erythema  (Moderate periwound induration with mild edema without warmth)   Drainage Amount Small   Drainage Color/Charcteristics Serosanguinous;Brown;Tan   Wound Care/Cleansing Soap and water;Normal saline   Dressing Vacuum based   Dressing Status New dressing   Wound Description (WOC) Full thickness   State of Healing (WOC) Early/partial granulation   Wound/Skin Edges (WOC) Open   Negative Pressure Wound Therapy Back Mid   Placement Date/Time: 03/24/17 0758   Inserted by: Dr De Jesus / Priya Galicia RN  Location: Back  Wound Location Orientation: Mid   Wound Type Surgical   Unit Type ([employer])   Dressing Type Black foam   Number of pieces used 5  (3 to base, 2 for bridge and disc pad)   Cycle Continuous   Target Pressure (mmHg) 125   Intensity (Low)   Cannister changed? Yes   Dressing Status New dressing   Drainage Amount Small   Drainage Color/Charcteristics Serosanguinous;Brown;Tan     Procedures:   Wound bed and periwound skin cleansed and evaluated. Prepped skin with barrier wipe after clipping  hair where wound vac draping will lay, hydrocolloid CGF to periwound skin. Attempted to use 1 1/2 thickness of vac sponge in wound however this became concave in wound. Placed another 1/2 thinkness of vac sponge to base of wound; still sucked down into wound however somewhat better. Adequate seal obtained however vac continued to gurgle without any evidence of drape leak (? Canister or vac itself); pt denies being bothered by the noise, has hearing loss so he doesn't notice.    Assessments:  Tissue pink/red and granular.  Continues to have some periwound induration and mild erythema without excessive warmth.    Plan of care:   Continue NPWT tx.  Return Wednesday.   Will plan on using more foam to base of wound bed to fill in wound better at next visit.    Supplies provided:  NA    Education:  Wound care instructions/education provided. Patient verbalized understanding of instruction/education.    Nurse face to face time: 60min    CC: [facility] ([facility] contacted their office to get contact information and last note)

## 2017-03-29 ENCOUNTER — HOSPITAL ENCOUNTER (OUTPATIENT)
Dept: WOUND CARE | Facility: HOSPITAL | Age: 72
Discharge: HOME | End: 2017-03-29
Admitting: FAMILY MEDICINE
Payer: COMMERCIAL

## 2017-03-29 ENCOUNTER — TELEPHONE (OUTPATIENT)
Dept: CASE MANAGEMENT | Facility: HOSPITAL | Age: 72
End: 2017-03-29

## 2017-03-29 VITALS — TEMPERATURE: 98.7 F | HEART RATE: 81 BPM | DIASTOLIC BLOOD PRESSURE: 97 MMHG | SYSTOLIC BLOOD PRESSURE: 176 MMHG

## 2017-03-29 LAB
BACTERIA SPEC CULT: ABNORMAL
BACTERIA SPEC CULT: NORMAL
MICRO REPORT STATUS: ABNORMAL
MICRO REPORT STATUS: NORMAL
SPECIMEN SOURCE: ABNORMAL
SPECIMEN SOURCE: NORMAL

## 2017-03-29 PROCEDURE — 97606 NEG PRS WND THER DME>50 SQCM: CPT

## 2017-03-29 NOTE — TELEPHONE ENCOUNTER
Attempted to make follow up phone call with patient. Unable to reach patient at this time. First attempt. Will attempt again.     68

## 2017-03-29 NOTE — PROGRESS NOTES
Trevor Alexandre, 1945  Chief Complaint   Patient presents with     WOUND CARE       Patient Active Problem List   Diagnosis     Abscess of back     Open wound of back       Concerns/Comments since last visits: Trevor is here for reevaluation and tx of mid back abscess wound that is being tx with NPWT. Pain has been 2/10 with use of q 6 h lortab. Took oxycodone hour before appt.    Objective:      03/29/17 1400   Wound 03/27/17 Back   Date First Assessed/Time First Assessed: 03/27/17 0810   Location: Back   Site Assessment Red;Granulation tissue;Pink  (Areas of cautery)   Raisa-wound Assessment Erythema;Induration  (Mild induration and warmth; improved since Monday)   Size - Length x Width x Depth (cm) 10x7.3x3.3   Drainage Amount Small   Drainage Color/Charcteristics Serosanguinous   Wound Care/Cleansing Soap and water;Normal saline   Dressing Vacuum based   Dressing Status New dressing   Wound Description (WOC) Full thickness   State of Healing (WOC) Early/partial granulation   Wound/Skin Edges (WOC) Open   Negative Pressure Wound Therapy Back Mid   Placement Date/Time: 03/24/17 0758   Inserted by: Dr De Jesus / Priya Galicia RN  Location: Back  Wound Location Orientation: Mid   Wound Type Surgical   Unit Type KCI   Dressing Type Black foam   Number of pieces used 4  (2 full thickness to base; 2 to bridge and pad disc)   Cycle Continuous   Target Pressure (mmHg) 125   Intensity (Low)   Cannister changed? Yes   Dressing Status New dressing   Drainage Amount Small   Drainage Color/Charcteristics Serosanguinous     Procedures:   Lidocaine 4% solution instilled into vac foam and let set before removal of foam was attempted; states it was tolerable this visit. Wound bed and periwound skin cleansed and evaluated. Barrier wipe and hydrocolloid applied to periwound skin. NPWT dressing applied as described above.    Assessments:  Evidence of new granulation tissue formation.  Induration and erythema improving.    Plan  of care:   Continue NPWT.  Return Friday.    Supplies provided:  NA    Education:  Wound care instructions/education provided. Patient verbalized understanding of instruction/education.    Nurse face to face time: 50min    CC: [facility]/Dr. De Jesus

## 2017-03-30 ENCOUNTER — TELEPHONE (OUTPATIENT)
Dept: CASE MANAGEMENT | Facility: HOSPITAL | Age: 72
End: 2017-03-30

## 2017-03-30 LAB
BACTERIA SPEC CULT: NORMAL
MICRO REPORT STATUS: NORMAL
SPECIMEN SOURCE: NORMAL

## 2017-03-30 NOTE — TELEPHONE ENCOUNTER
Trevor Alexandre, was discharged to home on  3/24/17 from [facility]. I spoke today with Irving regarding his discharge.   He indicates he  receive(d) sufficient information upon discharge. Medications were reviewed in full on discharge, including: Medications to be started; medications to be stopped; medications to be continued from preadmission and any side effects. Prescriptions were received at discharge and were able to be filled. Medications are being taken as prescribed. He indicated he had a question to ask his [facility] DrMakayla regarding his hydrocodone and was planning to call him today.  He indicates he has numerous wound care visits scheduled over the next weeks . He has them written down with the times.He has an  appointment scheduled for April 11 with Dr. De Jesus. Has the time written down.  He did not have any questions regarding discharge instructions or condition.  Per their request, the following employee (s) can be recognized for their outstanding services delivered:  All were very good! [facility].  Suggestions to improve service: nothing indicated  He was informed he may receive a survey in the mail from the hospital. Asked if he would kindly complete the survey in order for [facility] to know if services fully met patient needs.

## 2017-03-31 ENCOUNTER — HOSPITAL ENCOUNTER (OUTPATIENT)
Dept: WOUND CARE | Facility: HOSPITAL | Age: 72
Discharge: HOME | End: 2017-03-31
Attending: FAMILY MEDICINE | Admitting: FAMILY MEDICINE
Payer: COMMERCIAL

## 2017-03-31 VITALS — SYSTOLIC BLOOD PRESSURE: 131 MMHG | DIASTOLIC BLOOD PRESSURE: 83 MMHG | HEART RATE: 100 BPM | TEMPERATURE: 99.2 F

## 2017-03-31 PROCEDURE — 97606 NEG PRS WND THER DME>50 SQCM: CPT

## 2017-03-31 NOTE — PROGRESS NOTES
Trevor Chiuy, 1945  Chief Complaint   Patient presents with     WOUND CARE     open wound of back       Patient Active Problem List   Diagnosis     Abscess of back     Open wound of back       Concerns/Comments since last visits: has no specific complaints and has been feeling well.  Pain is improving. Does not like using hydrocodone and is cutting back to days with vac change    Objective:      03/31/17 1700   Wound 03/27/17 Back   Date First Assessed/Time First Assessed: 03/27/17 0810   Location: Back   Site Assessment Red;Granulation tissue;Slough  (5% tan; areas of cautery)   Raisa-wound Assessment Erythema  (mild)   Drainage Amount Moderate   Drainage Color/Charcteristics Serous;Yellow;Green  (slight yellow green tinge)   Wound Care/Cleansing Soap and water;Normal saline;Wound cleanser   Dressing Vacuum based   Dressing Status New dressing   Wound Description (WOC) Full thickness   State of Healing (WOC) Early/partial granulation   Wound/Skin Edges (WOC) Open   Negative Pressure Wound Therapy Back Mid   Placement Date/Time: 03/24/17 0758   Inserted by: Dr De Jesus / Priya Galicia RN  Location: Back  Wound Location Orientation: Mid   Wound Type Surgical   Unit Type KCI   Dressing Type Black foam   Number of pieces used 4   Cycle Continuous   Target Pressure (mmHg) 125   Intensity (low)   Cannister changed? Yes       Procedures:   Wound vac dressing change done.  Removed 2 black foam from wound base = bridge and disc landing.  Cleansed with hibiclens. Arisa wound skin protected with Duoderm CGF and skin barrier spray.  Inserted 2 black foam + bridge and disc landing.  Draped wound.  Confirmed seal check and vac settings (125 mm/hg, low, continuous)  prior to departure.    Assessments  TOMAS Damian Nurse Practitioner present to assist in evaluation and development of plan of care  Slight yellow/green tinge to drainage - no odor   A few scattered pustules    Plan of care:    Used HIbiclens to cleanse today due to  scattered pustules  MIcrocyn wound cleanser for cleansing.  Wound vac dressing changes 3x weekly    Supplies provided:  n/a    Education:  Wound care instructions/education provided. Patient verbalized understanding of instruction/education.    Nurse face to face time: 45 minutes    CC: Jeremy Glasgow

## 2017-04-01 LAB
BACTERIA SPEC CULT: NORMAL
MICRO REPORT STATUS: NORMAL
SPECIMEN SOURCE: NORMAL

## 2017-04-03 ENCOUNTER — HOSPITAL ENCOUNTER (OUTPATIENT)
Dept: WOUND CARE | Facility: HOSPITAL | Age: 72
Discharge: HOME | End: 2017-04-03
Attending: FAMILY MEDICINE | Admitting: FAMILY MEDICINE
Payer: COMMERCIAL

## 2017-04-03 VITALS — TEMPERATURE: 98.9 F | HEART RATE: 82 BPM | DIASTOLIC BLOOD PRESSURE: 78 MMHG | SYSTOLIC BLOOD PRESSURE: 131 MMHG

## 2017-04-03 PROCEDURE — 97606 NEG PRS WND THER DME>50 SQCM: CPT

## 2017-04-03 NOTE — PROGRESS NOTES
Trevor Alexandre, 1945  Chief Complaint   Patient presents with     WOUND CARE       Patient Active Problem List   Diagnosis     Abscess of back     Open wound of back       Concerns/Comments since last visits: Trevor is here for reevaluation and tx of open wound of back after cyst abscess. Offers no complaints.    Objective:      04/03/17 1400   Wound 03/27/17 Back   Date First Assessed/Time First Assessed: 03/27/17 0810   Location: Back   Site Assessment Red;Granulation tissue;Pink   Raisa-wound Assessment (3 small cavity areas (<1cm deep))   Drainage Amount Moderate   Drainage Color/Charcteristics Serous;Yellow;Green   Wound Care/Cleansing Soap and water;Normal saline  (Hibiclens to periwound skin)   Dressing Vacuum based   Dressing Status New dressing   Wound Description (WOC) Full thickness   State of Healing (WOC) Early/partial granulation   Wound/Skin Edges (WOC) Open   Negative Pressure Wound Therapy Back Mid   Placement Date/Time: 03/24/17 0758   Inserted by: Dr De Jesus / Priya Galicia RN  Location: Back  Wound Location Orientation: Mid   Wound Type Surgical   Unit Type KCI   Dressing Type Black foam   Number of pieces used 5  (3 small pieces to areas of depth; 2 full sized foam pieces)   Cycle Continuous   Target Pressure (mmHg) 125   Intensity (Low)   Cannister changed? Yes   Dressing Status New dressing   Drainage Amount Moderate   Drainage Color/Charcteristics Serous;Yellow;Green     Procedures:   Wound bed and periwound skin cleansed and evaluated. Barrier wipe and hydrocolloid CGF to periwound skin. NPWT dressing placed, adequate seal obtained.    Assessments:  New granulation tissue evident to wound base.  No periwound erythema or excessive warmth; no odor.  Drainage remains nonpurulent however with yellow green color.    Plan of care:   Continue NPWT.  Return Wednesday for reevaluation.    Supplies provided:  NA    Education:  Wound care instructions/education provided. Patient verbalized  understanding of instruction/education.    Nurse face to face time: 40min    CC: Jeremy Glasgow/Dr. De Jesus

## 2017-04-05 ENCOUNTER — HOSPITAL ENCOUNTER (OUTPATIENT)
Dept: WOUND CARE | Facility: HOSPITAL | Age: 72
Discharge: HOME | End: 2017-04-05
Attending: FAMILY MEDICINE | Admitting: FAMILY MEDICINE
Payer: COMMERCIAL

## 2017-04-05 VITALS — HEART RATE: 82 BPM | TEMPERATURE: 98.5 F | DIASTOLIC BLOOD PRESSURE: 91 MMHG | SYSTOLIC BLOOD PRESSURE: 145 MMHG

## 2017-04-05 PROCEDURE — 97606 NEG PRS WND THER DME>50 SQCM: CPT

## 2017-04-07 ENCOUNTER — HOSPITAL ENCOUNTER (OUTPATIENT)
Dept: WOUND CARE | Facility: HOSPITAL | Age: 72
Discharge: HOME | End: 2017-04-07
Attending: FAMILY MEDICINE | Admitting: FAMILY MEDICINE
Payer: COMMERCIAL

## 2017-04-07 VITALS — DIASTOLIC BLOOD PRESSURE: 90 MMHG | TEMPERATURE: 98.2 F | HEART RATE: 83 BPM | SYSTOLIC BLOOD PRESSURE: 144 MMHG

## 2017-04-07 PROCEDURE — 97606 NEG PRS WND THER DME>50 SQCM: CPT

## 2017-04-07 NOTE — PROGRESS NOTES
Trevor VIOLET Alexandre, 1945  Chief Complaint   Patient presents with     WOUND CARE     Open wound of back/Abscess       Patient Active Problem List   Diagnosis     Abscess of back     Open wound of back       Concerns/Comments since last visits: has no specific complaints and has been feeling well.    Objective:      04/07/17 1400   Wound 03/27/17 Back   Date First Assessed/Time First Assessed: 03/27/17 0810   Location: Back   Site Assessment Red;Granulation tissue;Pink   Raisa-wound Assessment (scattered pustules)   Drainage Amount Moderate   Drainage Color/Charcteristics Serous;Yellow;Green   Wound Care/Cleansing Soap and water;Normal saline  (Chloroprep)   Dressing Vacuum based   Dressing Status New dressing   Wound Description (WOC) Full thickness   State of Healing (WOC) Early/partial granulation   Wound/Skin Edges (WOC) Open   Negative Pressure Wound Therapy Back Mid   Placement Date/Time: 03/24/17 0758   Inserted by: Dr De Jesus / Priya Galicia RN  Location: Back  Wound Location Orientation: Mid   Wound Type Surgical   Unit Type KCI   Dressing Type Black foam   Number of pieces used 5   Cycle Continuous   Target Pressure (mmHg) 125   Intensity (low)   Cannister changed? Yes     10 - 12 pustules    Procedures:   Pustules deroofed with pick ups    Wound vac dressing change done.  Removed 4 black foam.  Cleansed with Chloroprep. Raisa wound skin protected with skin barrier wipe and Chloroprep.  Inserted 5 black foam.  Draped wound.  Confirmed seal check and vac settings (125 mm/hg, low, continuous)  prior to departure.    Assessments  Granular wound base with some undermining around 11:00     Plan of care:   Appt with Dr De Jesus on Tuesday - decided we would keep his appt on our schedule for Monday but if he isn't having any issues he will call Monday and cancel that appt.  We will try to meet him at the clinic on Tuesday if schedule allows.  He knows how to change the canister if he needs to.    Supplies  provided:  n/a    Education:  Wound care instructions/education provided. Demonstrated vac settings and canister change. Patient verbalized understanding of instruction/education.    Nurse face to face time: 45 min    CC: Tahir De Jesus

## 2017-04-11 ENCOUNTER — ALLIED HEALTH/NURSE VISIT (OUTPATIENT)
Dept: SURGERY | Facility: OTHER | Age: 72
End: 2017-04-11

## 2017-04-11 ENCOUNTER — OFFICE VISIT (OUTPATIENT)
Dept: SURGERY | Facility: OTHER | Age: 72
End: 2017-04-11
Attending: SURGERY
Payer: COMMERCIAL

## 2017-04-11 VITALS
TEMPERATURE: 97.7 F | HEART RATE: 99 BPM | DIASTOLIC BLOOD PRESSURE: 82 MMHG | OXYGEN SATURATION: 100 % | SYSTOLIC BLOOD PRESSURE: 124 MMHG

## 2017-04-11 DIAGNOSIS — L08.9 INFECTED SEBACEOUS CYST: Primary | ICD-10-CM

## 2017-04-11 DIAGNOSIS — L72.3 INFECTED SEBACEOUS CYST: Primary | ICD-10-CM

## 2017-04-11 DIAGNOSIS — S21.209D OPEN WOUND OF BACK, UNSPECIFIED LATERALITY, SUBSEQUENT ENCOUNTER: ICD-10-CM

## 2017-04-11 PROCEDURE — 97606 NEG PRS WND THER DME>50 SQCM: CPT

## 2017-04-11 PROCEDURE — 99024 POSTOP FOLLOW-UP VISIT: CPT | Performed by: SURGERY

## 2017-04-11 ASSESSMENT — PAIN SCALES - GENERAL: PAINLEVEL: NO PAIN (0)

## 2017-04-11 NOTE — MR AVS SNAPSHOT
After Visit Summary   4/11/2017    Trevor Alexandre    MRN: [id number]           Patient Information     Date Of Birth          1945        Visit Information        Provider Department      4/11/2017 2:56 PM Tahir De Jesus, DO [facility]        Today's Diagnoses     Open wound of back, unspecified laterality, subsequent encounter           Follow-ups after your visit        Your next 10 appointments already scheduled     Apr 13, 2017  2:00 PM CDT   (Arrive by 1:45 PM)   Return Visit with Loly Wells NP   [facility] ([facility])    [address] [place] [address]   [phone]            Apr 17, 2017  2:00 PM CDT   (Arrive by 1:45 PM)   Return Visit with Salena Damian NP   [facility] ([facility])    [address] [place] [address] [phone]            Apr 19, 2017  2:00 PM CDT   [facility] Visit with [facility]   [facility] ([facility] )    [address] [address] [phone]            Apr 21, 2017  1:00 PM CDT   (Arrive by 12:45 PM)   Return Visit with Salena Damian NP   [facility] ([facility])    [address] [place] [address] [phone]              Who to contact     If you have questions or need follow up information about today's clinic visit or your schedule please contact [facility] directly at [phone].  Normal or non-critical lab and imaging results will be communicated to you by MyChart, letter or phone within 4 business days after the clinic has received the results. If you do not hear from us within 7 days, please contact the clinic through MyChart or phone. If you have a critical or abnormal lab result, we will notify you by phone as soon as possible.  Submit refill requests through GeriJoy or call your pharmacy and they will forward the refill request to us. Please allow 3 business days for your  "refill to be completed.          Additional Information About Your Visit        HexagoharEventCombo Information     [employer] lets you send messages to your doctor, view your test results, renew your prescriptions, schedule appointments and more. To sign up, go to [email or web address] . Click on \"Log in\" on the left side of the screen, which will take you to the Welcome page. Then click on \"Sign up Now\" on the right side of the page.     You will be asked to enter the access code listed below, as well as some personal information. Please follow the directions to create your username and password.     Your access code is: [email or web address]  Expires: 2017  3:18 PM     Your access code will  in 90 days. If you need help or a new code, please call your [facility] clinic or [phone].        Care EveryWhere ID     This is your Care EveryWhere ID. This could be used by other organizations to access your [facility] medical records  [id number]         Blood Pressure from Last 3 Encounters:   17 124/82   17 144/90   17 145/91    Weight from Last 3 Encounters:   17 216 lb 1.6 oz (98 kg)   17 216 lb 7.9 oz (98.2 kg)              Today, you had the following     No orders found for display       Primary Care Provider Office Phone # Fax #    Jeremy Glasgow [phone].       [facility] [address] [facility] [address]        Thank you!     Thank you for choosing [facility]  for your care. Our goal is always to provide you with excellent care. Hearing back from our patients is one way we can continue to improve our services. Please take a few minutes to complete the written survey that you may receive in the mail after your visit with us. Thank you!             Your Updated Medication List - Protect others around you: Learn how to safely use, store and throw away your medicines at www.disposemymeds.org.          This list is accurate as of: 17  3:02 PM.  " Always use your most recent med list.                   Brand Name Dispense Instructions for use    GLIPIZIDE PO      Take 7.5 mg by mouth 2 times daily (before meals)       LISINOPRIL PO      Take 5 mg by mouth daily       metFORMIN 500 MG 24 hr tablet    GLUCOPHAGE-XR     Take 2,000 mg by mouth daily       oxyCODONE 5 MG IR tablet    ROXICODONE    30 tablet    Take 1-2 tablets (5-10 mg) by mouth every 6 hours as needed for moderate to severe pain (1 hour before dressing change)

## 2017-04-11 NOTE — NURSING NOTE
Assisted Dr De Jesus - Wound vac dressing change done.  Removed 4 black foam.  Cleansed with Chloroprep. Raisa wound skin protected with Duoderm CGF and skin barrier prep.  Inserted 3 black foam.  Draped wound.  Confirmed seal check and vac settings (125 mm/hg, low, continuous)  prior to departure.  There was some cycling noises with the wound vac that pt thinks are getting worse.  If we cannot correct this at his next visit we will provide him with a replacement vac.    Wound base is red and granular - Measurements 8.9 x 6.9 x 1.5 cm.  Scattered folliculitis with pustules.    Next visit is 4/13 at wound center.

## 2017-04-11 NOTE — MR AVS SNAPSHOT
After Visit Summary   4/11/2017    Trevor Alexandre    MRN: [id number]           Patient Information     Date Of Birth          1945        Visit Information        Provider Department      4/11/2017 11:30 AM Tahir De Jesus, DO [facility]        Today's Diagnoses     Infected sebaceous cyst    -  1       Follow-ups after your visit        Your next 10 appointments already scheduled     Apr 13, 2017  2:00 PM CDT   (Arrive by 1:45 PM)   Return Visit with Loly Wells NP   [facility] ([facility])    [address] [facility] [address]   [phone]            Apr 17, 2017  2:00 PM CDT   (Arrive by 1:45 PM)   Return Visit with Salena Damian NP   [facility] ([facility])    [address] [facility] [address] [phone]            Apr 19, 2017  2:00 PM CDT   [facility]    [address] [address] [phone]            Apr 21, 2017  1:00 PM CDT   (Arrive by 12:45 PM)   Return Visit with Salena Damian NP   [facility] ([facility])    [address] [facility] [address] [phone]              Who to contact     If you have questions or need follow up information about today's clinic visit or your schedule please contact [facility] directly at [phone].  Normal or non-critical lab and imaging results will be communicated to you by MyChart, letter or phone within 4 business days after the clinic has received the results. If you do not hear from us within 7 days, please contact the clinic through Versafehart or phone. If you have a critical or abnormal lab result, we will notify you by phone as soon as possible.  Submit refill requests through SavingStar or call your pharmacy and they will forward the refill request to us. Please allow 3 business days for your refill to be completed.           "Additional Information About Your Visit        MyChart Information     [employer] lets you send messages to your doctor, view your test results, renew your prescriptions, schedule appointments and more. To sign up, go to [email or web address] . Click on \"Log in\" on the left side of the screen, which will take you to the Welcome page. Then click on \"Sign up Now\" on the right side of the page.     You will be asked to enter the access code listed below, as well as some personal information. Please follow the directions to create your username and password.     Your access code is: [email or web address]  Expires: 2017  3:18 PM     Your access code will  in 90 days. If you need help or a new code, please call your [facility] clinic or [phone].        Care EveryWhere ID     This is your Care EveryWhere ID. This could be used by other organizations to access your [facility] medical records  [id number]        Your Vitals Were     Pulse Temperature Pulse Oximetry             99 97.7  F (36.5  C) (Tympanic) 100%          Blood Pressure from Last 3 Encounters:   17 124/82   17 144/90   17 145/91    Weight from Last 3 Encounters:   17 216 lb 1.6 oz (98 kg)   17 216 lb 7.9 oz (98.2 kg)              Today, you had the following     No orders found for display       Primary Care Provider Office Phone # Fax #    Jeremy DARNELL Glasgow [phone].       [facility] [address] [facility] [address]        Thank you!     Thank you for choosing [facility]  for your care. Our goal is always to provide you with excellent care. Hearing back from our patients is one way we can continue to improve our services. Please take a few minutes to complete the written survey that you may receive in the mail after your visit with us. Thank you!             Your Updated Medication List - Protect others around you: Learn how to safely use, store and throw away your medicines at " www.disposemymeds.org.          This list is accurate as of: 4/11/17 11:58 AM.  Always use your most recent med list.                   Brand Name Dispense Instructions for use    GLIPIZIDE PO      Take 7.5 mg by mouth 2 times daily (before meals)       LISINOPRIL PO      Take 5 mg by mouth daily       metFORMIN 500 MG 24 hr tablet    GLUCOPHAGE-XR     Take 2,000 mg by mouth daily       oxyCODONE 5 MG IR tablet    ROXICODONE    30 tablet    Take 1-2 tablets (5-10 mg) by mouth every 6 hours as needed for moderate to severe pain (1 hour before dressing change)

## 2017-04-11 NOTE — PROGRESS NOTES
S: S/P incision and drainage of infected sebaceous cyst by Dr. Dale on 3/22/17.  I performed wound irrigation and debridement and wound vac placement on 3/24/17.  He's been following up with wound care MWF.  They've noted greenish discharge, but good healing, no odor.    O:  /82  Pulse 99  Temp 97.7  F (36.5  C) (Tympanic)  SpO2 100%   Gen: AAOx4, NAD, WN/DW non toxic, ambulating without assistance, smiles and jokes.  Wound: 8.9x6.9x1.5 cm with 100% healthy pink granulation tissue    A/P  #1 infected sebaceous cyst  #2 Open wound    Wound looks great.  Tolerating wound vac.  Appreciate the care and service provided by Wound care.  Appreciate Priya changing the wound vac today.  They care closed Friday, so plan is for a Thursday wound vac change.  Will see in 2 weeks, sooner as needed.

## 2017-04-11 NOTE — NURSING NOTE
"Chief Complaint   Patient presents with     Surgical Followup     Ind, Wound Vac 3/24/2017       Initial /82  Pulse 99  Temp 97.7  F (36.5  C) (Tympanic)  SpO2 100% Estimated body mass index is 31.01 kg/(m^2) as calculated from the following:    Height as of 3/27/17: 5' 10\" (1.778 m).    Weight as of 3/27/17: 216 lb 1.6 oz (98 kg).  Medication Reconciliation: complete       Teena Estrella      "

## 2017-04-13 ENCOUNTER — OFFICE VISIT (OUTPATIENT)
Dept: WOUND CARE | Facility: OTHER | Age: 72
End: 2017-04-13
Attending: NURSE PRACTITIONER
Payer: COMMERCIAL

## 2017-04-13 VITALS
TEMPERATURE: 98.7 F | SYSTOLIC BLOOD PRESSURE: 129 MMHG | BODY MASS INDEX: 29.96 KG/M2 | HEART RATE: 77 BPM | WEIGHT: 214 LBS | DIASTOLIC BLOOD PRESSURE: 83 MMHG | HEIGHT: 71 IN

## 2017-04-13 DIAGNOSIS — T81.31XD DEHISCENCE OF SURGICAL WOUND, SUBSEQUENT ENCOUNTER: Primary | ICD-10-CM

## 2017-04-13 PROCEDURE — 97606 NEG PRS WND THER DME>50 SQCM: CPT | Performed by: NURSE PRACTITIONER

## 2017-04-13 ASSESSMENT — PAIN SCALES - GENERAL: PAINLEVEL: NO PAIN (0)

## 2017-04-13 NOTE — MR AVS SNAPSHOT
After Visit Summary   4/13/2017    Trevor Alexandre    MRN: [id number]           Patient Information     Date Of Birth          1945        Visit Information        Provider Department      4/13/2017 2:00 PM Loly Wells NP [facility]        Care Instructions    Continue with Monday, Wednesday, and Friday NPWT dressing changes [facility].      Please call if any questions/concerns and/or problems ([phone])    Follow up as previously scheduled.        Three building blocks of wound healing  1. Protein (if not contraindicated)  2. Vitamin C 500 mg oral twice a day  3.  Zinc 220 mg oral daily           Follow-ups after your visit        Your next 10 appointments already scheduled     Apr 24, 2017  2:00 PM CDT   [facility] Visit with [facility]   [facility] ([facility] )    [address] [id number] [phone]            Apr 27, 2017  1:00 PM CDT   [facility] Visit with [facility]   [facility] ([facility] )    [address] [address] [phone]            May 01, 2017  1:00 PM CDT   [facility] Visit with [facility]   [facility] ([facility] )    [address] [address] [phone]            May 04, 2017  1:00 PM CDT   [facility] Visit with [facility]   [facility] ([facility] )    [address] [id number] [phone]              Who to contact     If you have questions or need follow up information about today's clinic visit or your schedule please contact [facility] directly at [phone].  Normal or non-critical lab and imaging results will be communicated to you by MyChart, letter or phone within 4 business days after the clinic has received the results. If you do not hear from us within 7 days, please contact the clinic through MyChart or phone. If you have a critical or abnormal lab result, we  "will notify you by phone as soon as possible.  Submit refill requests through [employer] or call your pharmacy and they will forward the refill request to us. Please allow 3 business days for your refill to be completed.          Additional Information About Your Visit        [facility] Information     [employer] lets you send messages to your doctor, view your test results, renew your prescriptions, schedule appointments and more. To sign up, go to [email or web address] . Click on \"Log in\" on the left side of the screen, which will take you to the Welcome page. Then click on \"Sign up Now\" on the right side of the page.     You will be asked to enter the access code listed below, as well as some personal information. Please follow the directions to create your username and password.     Your access code is: [email or web address]  Expires: 2017  3:18 PM     Your access code will  in 90 days. If you need help or a new code, please call your [place] clinic or [phone].        Care EveryWhere ID     This is your Care EveryWhere ID. This could be used by other organizations to access your [place] medical records  [id number]        Your Vitals Were     Pulse Temperature Height BMI (Body Mass Index)          77 98.7  F (37.1  C) 5' 10.5\" (1.791 m) 30.27 kg/m2         Blood Pressure from Last 3 Encounters:   17 123/80   17 136/87   17 129/83    Weight from Last 3 Encounters:   17 214 lb 1.6 oz (97.1 kg)   17 214 lb (97.1 kg)   17 216 lb 1.6 oz (98 kg)              Today, you had the following     No orders found for display       Primary Care Provider Office Phone # Fax #    Jeremy Glasgow [phone].       [facility] [address] [facility] [address]        Thank you!     Thank you for choosing [facility]  for your care. Our goal is always to provide you with excellent care. Hearing back from our patients is one way we can continue to " improve our services. Please take a few minutes to complete the written survey that you may receive in the mail after your visit with us. Thank you!             Your Updated Medication List - Protect others around you: Learn how to safely use, store and throw away your medicines at www.disposemymeds.org.          This list is accurate as of: 4/13/17 11:59 PM.  Always use your most recent med list.                   Brand Name Dispense Instructions for use    GLIPIZIDE PO      Take 7.5 mg by mouth 2 times daily (before meals)       LISINOPRIL PO      Take 5 mg by mouth daily       metFORMIN 500 MG 24 hr tablet    GLUCOPHAGE-XR     Take 2,000 mg by mouth daily       oxyCODONE 5 MG IR tablet    ROXICODONE    30 tablet    Take 1-2 tablets (5-10 mg) by mouth every 6 hours as needed for moderate to severe pain (1 hour before dressing change)

## 2017-04-17 ENCOUNTER — OFFICE VISIT (OUTPATIENT)
Dept: WOUND CARE | Facility: OTHER | Age: 72
End: 2017-04-17
Attending: NURSE PRACTITIONER
Payer: COMMERCIAL

## 2017-04-17 VITALS
BODY MASS INDEX: 29.97 KG/M2 | WEIGHT: 214.1 LBS | HEIGHT: 71 IN | DIASTOLIC BLOOD PRESSURE: 87 MMHG | HEART RATE: 74 BPM | SYSTOLIC BLOOD PRESSURE: 136 MMHG | TEMPERATURE: 97.5 F

## 2017-04-17 DIAGNOSIS — T81.31XD DEHISCENCE OF SURGICAL WOUND, SUBSEQUENT ENCOUNTER: Primary | ICD-10-CM

## 2017-04-17 PROCEDURE — 97606 NEG PRS WND THER DME>50 SQCM: CPT | Performed by: NURSE PRACTITIONER

## 2017-04-17 ASSESSMENT — PAIN SCALES - GENERAL: PAINLEVEL: MILD PAIN (2)

## 2017-04-17 NOTE — MR AVS SNAPSHOT
After Visit Summary   4/17/2017    Trevor Alexandre    MRN: [id number]           Patient Information     Date Of Birth          1945        Visit Information        Provider Department      4/17/2017 2:00 PM Salena Damian NP [facility]        Today's Diagnoses     Dehiscence of surgical wound, subsequent encounter    -  1       Follow-ups after your visit        Your next 10 appointments already scheduled     Apr 20, 2017  1:00 PM CDT   [facility] Visit with [facility]   [facility] ([facility] )    [address] [address] [phone]            Apr 24, 2017  2:00 PM CDT   [facility] Visit with [facility]   [facility] ([facility] )    [address] [address] [phone]            Apr 27, 2017  1:00 PM CDT   [facility] Visit with [facility]   [facility] ([facility] )    [address] [address] [phone]              Who to contact     If you have questions or need follow up information about today's clinic visit or your schedule please contact [facility] directly at [phone].  Normal or non-critical lab and imaging results will be communicated to you by MyChart, letter or phone within 4 business days after the clinic has received the results. If you do not hear from us within 7 days, please contact the clinic through MyChart or phone. If you have a critical or abnormal lab result, we will notify you by phone as soon as possible.  Submit refill requests through [employer] or call your pharmacy and they will forward the refill request to us. Please allow 3 business days for your refill to be completed.          Additional Information About Your Visit        MyChart Information     [employer] lets you send messages to your doctor, view your test results, renew your prescriptions, schedule appointments and more. To sign up, go to [email or web address] .  "Click on \"Log in\" on the left side of the screen, which will take you to the Welcome page. Then click on \"Sign up Now\" on the right side of the page.     You will be asked to enter the access code listed below, as well as some personal information. Please follow the directions to create your username and password.     Your access code is: [email or web address]  Expires: 2017  3:18 PM     Your access code will  in 90 days. If you need help or a new code, please call your [place] clinic or [phone].        Care EveryWhere ID     This is your Care EveryWhere ID. This could be used by other organizations to access your [place] medical records  [id number]        Your Vitals Were     Pulse Temperature Height BMI (Body Mass Index)          74 97.5  F (36.4  C) 5' 10.5\" (1.791 m) 30.29 kg/m2         Blood Pressure from Last 3 Encounters:   17 136/87   17 129/83   17 124/82    Weight from Last 3 Encounters:   17 214 lb 1.6 oz (97.1 kg)   17 214 lb (97.1 kg)   17 216 lb 1.6 oz (98 kg)              Today, you had the following     No orders found for display       Primary Care Provider Office Phone # Fax #    Jeremy Glasgow [phone].       [facility] [address] [place] [address]        Thank you!     Thank you for choosing [facility]  for your care. Our goal is always to provide you with excellent care. Hearing back from our patients is one way we can continue to improve our services. Please take a few minutes to complete the written survey that you may receive in the mail after your visit with us. Thank you!             Your Updated Medication List - Protect others around you: Learn how to safely use, store and throw away your medicines at www.disposemymeds.org.          This list is accurate as of: 17  2:47 PM.  Always use your most recent med list.                   Brand Name Dispense Instructions for use    GLIPIZIDE PO "      Take 7.5 mg by mouth 2 times daily (before meals)       LISINOPRIL PO      Take 5 mg by mouth daily       metFORMIN 500 MG 24 hr tablet    GLUCOPHAGE-XR     Take 2,000 mg by mouth daily       oxyCODONE 5 MG IR tablet    ROXICODONE    30 tablet    Take 1-2 tablets (5-10 mg) by mouth every 6 hours as needed for moderate to severe pain (1 hour before dressing change)

## 2017-04-17 NOTE — PROGRESS NOTES
SUBJECTIVE:  Trevor Alexandre, 71 year old, male presents with the following Chief Complaint(s) with HPI to follow: wound care    HPI:  Trevor is here for evaluation and treatment of his back wound.  He had an infected sebaceous cyst removed on 3/22/17.  He had NPWT (negative pressure wound therapy-wound vac) placed on 3/24/17.  He continues with NPWT.   He has no pain in the area.    Patient Active Problem List   Diagnosis     Abscess of back     Open wound of back       Past Medical History:   Diagnosis Date     Abscess of back 03/23/2017     Open wound of back 03/23/2017       Past Surgical History:   Procedure Laterality Date     EXAM UNDER ANESTHESIA, CHANGE DRESSING (LOCATION), COMBINED N/A 3/24/2017    Procedure: COMBINED EXAM UNDER ANESTHESIA, CHANGE DRESSING (LOCATION);  Surgeon: Tahir De Jesus DO;  [facility]     EXCISE LESION BACK N/A 3/22/2017    Procedure: EXCISE LESION BACK;  Surgeon: Servando Dale MD;  [facility]     IRRIGATION AND DEBRIDEMENT TRUNK, COMBINED N/A 3/23/2017    Procedure: COMBINED IRRIGATION AND DEBRIDEMENT TRUNK;  Surgeon: Servando Dale MD;  Location: [facility]       No family history on file.    Social History   Substance Use Topics     Smoking status: Former Smoker     Types: Cigarettes     Quit date: 3/27/2002     Smokeless tobacco: Not on file     Alcohol use Not on file       Current Outpatient Prescriptions   Medication Sig Dispense Refill     oxyCODONE (ROXICODONE) 5 MG IR tablet Take 1-2 tablets (5-10 mg) by mouth every 6 hours as needed for moderate to severe pain (1 hour before dressing change) 30 tablet 0     metFORMIN (GLUCOPHAGE-XR) 500 MG 24 hr tablet Take 2,000 mg by mouth daily       GLIPIZIDE PO Take 7.5 mg by mouth 2 times daily (before meals)       LISINOPRIL PO Take 5 mg by mouth daily         No Known Allergies    REVIEW OF SYSTEMS  Skin: as above  Eyes: negative  Ears/Nose/Throat: negative  Respiratory: No shortness of breath, dyspnea on  exertion, cough, or hemoptysis  Cardiovascular: negative  Gastrointestinal: negative  Genitourinary: negative  Musculoskeletal: sciatica occ  Neurologic: negative  Psychiatric: negative  Hematologic/Lymphatic/Immunologic: negative  Endocrine: diabetes    OBJECTIVE:  Temp: 97.5  F (36.4  C)   BP: 136/87 Pulse: 74              Constitutional: healthy, alert and no distress  Head: Normocephalic. No masses, lesions, tenderness or abnormalities  Cardiovascular: RRR. No murmurs, clicks gallops or rub  Respiratory:  Good diaphragmatic excursion. Lungs clear  Gastrointestinal: Abdomen soft, non-tender. BS normal. No masses, organomegaly  : Deferred  Musculoskeletal: extremities normal- no gross deformities noted, gait normal and normal muscle tone  Skin:        Wound description:  Type of Wound- surgical; Location- upper/mid back , Drainage amount-moderate, Drainage color-tan and greenish,  Odor- none; Wound bed-100% red and granulating, Surrounding skin-intact.       Measurements: 8.7 X 6.0 cm X 1.1 cm deep       Dressing change:  Wound cleansed with soap and water, dressed with two pieces of black foam (one full thickness and one 1/2 thickness), pump set at 125 mm/hg continuous. Good seal obtained.      Neurologic: Gait normal.  Sensation grossly WNL.  Psychiatric: mentation appears normal and affect normal/bright    ASSESSMENT / PLAN:  Continue NPWT, decrease frequency to twice weekly.    Salena Damian  CNP, CWOCN  Urology and Wound Care  April 17, 2017

## 2017-04-19 NOTE — DISCHARGE SUMMARY
DATE OF ADMISSION:  03/22/2017       DATE OF DISCHARGE:  03/24/2017       PRIMARY DIAGNOSIS:  Large abscess upper back secondary to infected sebaceous cyst.      SECONDARY DIAGNOSES:  Type 2 diabetes.      SURGICAL PROCEDURES:   1.  Incision and drainage and debridement of large abscess, back.   2.  Dressing change large abscess, back.   3.  Dressing change large abscess, back with application of wound VAC.      COMPLICATIONS:  Nil.      HOSPITAL COURSE:  Mr. Trevor Alexandre is a 71-year-old male who presented to the emergency room [facility].  He had been having troubles for approximately a month.  This had [facility], but unfortunately had not settled down.  He developed more pain and drainage.  [facility] and noted that there have been no improvement and thus he was sent to the emergency room.      He is a type 2 diabetic.  Generally well controlled on oral hypoglycemic agents.  Otherwise quite healthy.  His original blood work did show a hemoglobin of 14.4 with a white count of 14.5.  CRP was 165.  Glucose did tend to be running high at 190.      The patient was started on IV antibiotics.  Was admitted in the hospital and taken to the operating room later in the day for an incision and drainage.  He was found to have a large abscess.  This was from an infected sebaceous cyst.  There was a fair amount of necrotic fat and subcutaneous tissue.  The abscess did go down to the underlying fascia but not into the muscle.  The wound did measure 10 cm x 9 cm x 5 cm.  The wound was packed.  Post-surgery he did feel immediately better.      The patient's hemoglobin the next day was 9.5.  This was consistent with the expected blood loss in the operating room for debridement of the infected tissue.  The patient was taken back to the operating room.  A dressing change was carried out which did show  that the wound looked quite good.  No further debridement was necessary.  The following day he was returned to the operating room by Dr. De Jesus.  Again, the wound looked quite clean.  The wound VAC was applied.      He was discharged home following the application of a wound VAC.  He will be followed in Wound Care.  He will be seen by the surgeons as necessary.         KRYS JUÁREZ MD             D: 2017 19:07   T: 2017 08:12   MT: eliz      Name:     BENJAMIN SALGADO   MRN:      [id number]        Account:        [id number]   :      1945           Admit Date:     [id number]                                  Discharge Date: 2017      Document: [id number]

## 2017-04-20 ENCOUNTER — HOSPITAL ENCOUNTER (OUTPATIENT)
Dept: WOUND CARE | Facility: HOSPITAL | Age: 72
Discharge: HOME | End: 2017-04-20
Attending: FAMILY MEDICINE | Admitting: FAMILY MEDICINE
Payer: COMMERCIAL

## 2017-04-20 VITALS — DIASTOLIC BLOOD PRESSURE: 80 MMHG | HEART RATE: 74 BPM | TEMPERATURE: 98 F | SYSTOLIC BLOOD PRESSURE: 123 MMHG

## 2017-04-20 PROCEDURE — 97605 NEG PRS WND THER DME<=50SQCM: CPT

## 2017-04-20 NOTE — PROGRESS NOTES
Trevor Alexandre, 1945  Chief Complaint   Patient presents with     WOUND CARE       Patient Active Problem List   Diagnosis     Abscess of back     Open wound of back     Dehiscence of surgical wound, subsequent encounter       Concerns/Comments since last visits: Trevor is here for reevaluation and tx of open back wound. Offers no complaints of pain.    Objective:      04/20/17 1600   Wound 03/27/17 Back   Date First Assessed/Time First Assessed: 03/27/17 0810   Location: Back   Site Assessment Red;Granulation tissue   Raisa-wound Assessment WDL   Size - Length x Width x Depth (cm) (2 small areas of depth (1 linear & 1 circular))   Drainage Amount Moderate   Drainage Color/Charcteristics Serous;Yellow   Wound Care/Cleansing Soap and water   Dressing Vacuum based   Dressing Status New dressing   Wound Description (WOC) Full thickness   State of Healing (WOC) Early/partial granulation   Wound/Skin Edges (WOC) Attached edges   Negative Pressure Wound Therapy Back Mid   Placement Date/Time: 03/24/17 0758   Inserted by: Dr De Jesus / Priya Galicia RN  Location: Back  Wound Location Orientation: Mid   Wound Type Surgical   Unit Type KCI   Dressing Type Black foam   Number of pieces used 2   Cycle Continuous   Target Pressure (mmHg) 125   Intensity (Low)   Cannister changed? Yes   Dressing Status New dressing     Procedures:   Wound bed cleansed and evaluated. Periwound skin prepped with barrier wipe and hydrocolloid. Dressed as described above. Tucked small piece of foam into areas of depth to base as they were filled with white debris this visit.    Assessments:  Healthy granulation tissue to base.  No periwound erythema or excessive warmth; no odor.    Plan of care:   Continue NPWT, pt was recently decreased to twice a week visits.    Supplies provided:  NA    Education:  Wound care instructions/education provided. Patient verbalized understanding of instruction/education.    Nurse face to face time: 40min    CC:  Jeremy Glasgow/Dr De Jesus

## 2017-04-20 NOTE — PROGRESS NOTES
"SUBJECTIVE:  Trevor Alexandre, 71 year old, male presents with the following Chief Complaint(s) with HPI to follow:  Chief Complaint   Patient presents with     WOUND CARE     NPWT dressing change        Wound Care    HPI:  Trevor is here today for the reassessment and treatment of back wound.  Back story:  Irving states that he has had a longstanding lump in the area, which was present for   At the end of February, he accidentally struck the area on a door.  The lump got bigger and painful.    [facility]   He was diagnosed with infection and started on minocycline.   Shortly after leaving the clinic, the area \"opened up\" and drained a copious amount of fluid. It's been continuing to drain ever since then.   The pain continue to worsen.    [facility].   Despite this, the pain and drainage continues.    [facility]ving [facility] and was [facility] to  here.    Irving had the infected sebaceous cyst removed on 3/22/17. He had NPWT (negative pressure wound therapy-wound vac) placed on 3/24/17.   He continues having the NPWT dressing changed here at the [facility] center every Monday, Wednesday, and Friday.  .   He has no pain in the area.   Denies any fevers, chills, and/or malodorous drainage.     Patient Active Problem List   Diagnosis     Abscess of back     Open wound of back     Dehiscence of surgical wound, subsequent encounter       Past Medical History:   Diagnosis Date     Abscess of back 03/23/2017     Open wound of back 03/23/2017       Past Surgical History:   Procedure Laterality Date     EXAM UNDER ANESTHESIA, CHANGE DRESSING (LOCATION), COMBINED N/A 3/24/2017    Procedure: COMBINED EXAM UNDER ANESTHESIA, CHANGE DRESSING (LOCATION);  Surgeon: Tahir De Jesus DO;  Location: [facility]     EXCISE LESION BACK N/A 3/22/2017    Procedure: EXCISE LESION BACK;  Surgeon: Servando Dale MD;  Location: [facility]     IRRIGATION AND DEBRIDEMENT TRUNK, " "COMBINED N/A 3/23/2017    Procedure: COMBINED IRRIGATION AND DEBRIDEMENT TRUNK;  Surgeon: Servando Dale MD;  Location: [facility]       No family history on file.    Social History   Substance Use Topics     Smoking status: Former Smoker     Types: Cigarettes     Quit date: 3/27/2002     Smokeless tobacco: Not on file     Alcohol use Not on file       Current Outpatient Prescriptions   Medication Sig Dispense Refill     oxyCODONE (ROXICODONE) 5 MG IR tablet Take 1-2 tablets (5-10 mg) by mouth every 6 hours as needed for moderate to severe pain (1 hour before dressing change) 30 tablet 0     metFORMIN (GLUCOPHAGE-XR) 500 MG 24 hr tablet Take 2,000 mg by mouth daily       GLIPIZIDE PO Take 7.5 mg by mouth 2 times daily (before meals)       LISINOPRIL PO Take 5 mg by mouth daily         No Known Allergies    REVIEW OF SYSTEMS  Skin: as noted above  Eyes: negative  Ears/Nose/Throat: negative  Respiratory: No shortness of breath, dyspnea on exertion, cough, or hemoptysis  Cardiovascular: negative  Gastrointestinal: negative  Genitourinary: negative  Musculoskeletal: history of sciatica   Neurologic: negative  Psychiatric: negative  Hematologic/Lymphatic/Immunologic: negative  Endocrine: diabetes    OBJECTIVE:  /83  Pulse 77  Temp 98.7  F (37.1  C)  Ht 5' 10.5\" (1.791 m)  Wt 214 lb (97.1 kg)  BMI 30.27 kg/m2  Constitutional: healthy, alert and no distress  Cardiovascular: RRR. No murmurs, clicks gallops or rub  Respiratory: Good diaphragmatic excursion. Lungs clear  Musculoskeletal: extremities normal- no gross deformities noted, gait normal and normal muscle tone  Skin:   Wound description: Type of Wound- surgical; Location- upper/mid back , Drainage amount-moderate, Drainage color-tan and greenish, Odor- none; Wound bed-100% red and granulating, Surrounding skin-intact, resolving folliculitis .  Measurements: not done today (last measurement: 8.9 x 6.9 x 1.5 cm on 4/11/17)   Dressing change:   Removed black " foam.    Wound cleansed with soap and water.    Applied chlorhexidine around periwound skin.  Applied skin barrier spray and duoderm around periwound edges.      Dressed with 3 pieces of black foam (one full thickness and 2 1/2 thickness), pump set at 125 mm/hg continuous. Good seal obtained.        Psychiatric: mentation appears normal and affect normal/bright    LABS  Results for orders placed or performed during the hospital encounter of 03/22/17   CBC with platelets differential   Result Value Ref Range    WBC 14.5 (H) 4.0 - 11.0 10e9/L    RBC Count 4.91 4.4 - 5.9 10e12/L    Hemoglobin 14.4 13.3 - 17.7 g/dL    Hematocrit 42.8 40.0 - 53.0 %    MCV 87 78 - 100 fl    MCH 29.3 26.5 - 33.0 pg    MCHC 33.6 31.5 - 36.5 g/dL    RDW 12.5 10.0 - 15.0 %    Platelet Count 385 150 - 450 10e9/L    Diff Method Automated Method     % Neutrophils 74.6 %    % Lymphocytes 13.2 %    % Monocytes 8.7 %    % Eosinophils 0.8 %    % Basophils 0.5 %    % Immature Granulocytes 2.2 %    Nucleated RBCs 0 0 /100    Absolute Neutrophil 10.8 (H) 1.6 - 8.3 10e9/L    Absolute Lymphocytes 1.9 0.8 - 5.3 10e9/L    Absolute Monocytes 1.3 0.0 - 1.3 10e9/L    Absolute Eosinophils 0.1 0.0 - 0.7 10e9/L    Absolute Basophils 0.1 0.0 - 0.2 10e9/L    Abs Immature Granulocytes 0.3 0 - 0.4 10e9/L    Absolute Nucleated RBC 0.0    Comprehensive metabolic panel   Result Value Ref Range    Sodium 134 133 - 144 mmol/L    Potassium 4.1 3.4 - 5.3 mmol/L    Chloride 99 94 - 109 mmol/L    Carbon Dioxide 26 20 - 32 mmol/L    Anion Gap 9 3 - 14 mmol/L    Glucose 190 (H) 70 - 99 mg/dL    Urea Nitrogen 6 (L) 7 - 30 mg/dL    Creatinine 0.73 0.66 - 1.25 mg/dL    GFR Estimate >90  Non  GFR Calc   >60 mL/min/1.7m2    GFR Estimate If Black >90   GFR Calc   >60 mL/min/1.7m2    Calcium 9.1 8.5 - 10.1 mg/dL    Bilirubin Total 0.4 0.2 - 1.3 mg/dL    Albumin 3.3 (L) 3.4 - 5.0 g/dL    Protein Total 8.4 6.8 - 8.8 g/dL    Alkaline Phosphatase 75 40 -  150 U/L    ALT 31 0 - 70 U/L    AST 12 0 - 45 U/L   CRP inflammation   Result Value Ref Range    CRP Inflammation 165.0 (H) 0.0 - 8.0 mg/L   Erythrocyte sedimentation rate auto   Result Value Ref Range    Sed Rate 44 (H) 0 - 20 mm/h   Glucose by meter   Result Value Ref Range    Glucose 166 (H) 70 - 99 mg/dL   Glucose by meter   Result Value Ref Range    Glucose 250 (H) 70 - 99 mg/dL   Glucose by meter   Result Value Ref Range    Glucose 439 (H) 70 - 99 mg/dL   Glucose by meter   Result Value Ref Range    Glucose 466 (H) 70 - 99 mg/dL   Glucose by meter   Result Value Ref Range    Glucose 441 (H) 70 - 99 mg/dL   Hemoglobin A1c   Result Value Ref Range    Hemoglobin A1C 8.0 (H) 4.3 - 6.0 %   CBC with platelets differential   Result Value Ref Range    WBC 14.2 (H) 4.0 - 11.0 10e9/L    RBC Count 3.28 (L) 4.4 - 5.9 10e12/L    Hemoglobin 9.5 (L) 13.3 - 17.7 g/dL    Hematocrit 28.8 (L) 40.0 - 53.0 %    MCV 88 78 - 100 fl    MCH 29.0 26.5 - 33.0 pg    MCHC 33.0 31.5 - 36.5 g/dL    RDW 12.5 10.0 - 15.0 %    Platelet Count 367 150 - 450 10e9/L    Diff Method Automated Method     % Neutrophils 69.5 %    % Lymphocytes 17.2 %    % Monocytes 9.0 %    % Eosinophils 0.2 %    % Basophils 0.4 %    % Immature Granulocytes 3.7 %    Nucleated RBCs 0 0 /100    Absolute Neutrophil 9.9 (H) 1.6 - 8.3 10e9/L    Absolute Lymphocytes 2.5 0.8 - 5.3 10e9/L    Absolute Monocytes 1.3 0.0 - 1.3 10e9/L    Absolute Eosinophils 0.0 0.0 - 0.7 10e9/L    Absolute Basophils 0.1 0.0 - 0.2 10e9/L    Abs Immature Granulocytes 0.5 (H) 0 - 0.4 10e9/L    Absolute Nucleated RBC 0.0    Basic metabolic panel   Result Value Ref Range    Sodium 139 133 - 144 mmol/L    Potassium 4.3 3.4 - 5.3 mmol/L    Chloride 106 94 - 109 mmol/L    Carbon Dioxide 25 20 - 32 mmol/L    Anion Gap 8 3 - 14 mmol/L    Glucose 217 (H) 70 - 99 mg/dL    Urea Nitrogen 10 7 - 30 mg/dL    Creatinine 0.70 0.66 - 1.25 mg/dL    GFR Estimate >90  Non  GFR Calc   >60 mL/min/1.7m2     GFR Estimate If Black >90   GFR Calc   >60 mL/min/1.7m2    Calcium 8.1 (L) 8.5 - 10.1 mg/dL   Glucose by meter   Result Value Ref Range    Glucose 275 (H) 70 - 99 mg/dL   Glucose by meter   Result Value Ref Range    Glucose 205 (H) 70 - 99 mg/dL   Estimated Average Glucose   Result Value Ref Range    Estimated Average Glucose 183 mg/dL   Glucose by meter   Result Value Ref Range    Glucose 163 (H) 70 - 99 mg/dL   Glucose by meter   Result Value Ref Range    Glucose 186 (H) 70 - 99 mg/dL   Glucose by meter   Result Value Ref Range    Glucose 331 (H) 70 - 99 mg/dL   Glucose by meter   Result Value Ref Range    Glucose 341 (H) 70 - 99 mg/dL   CBC with platelets differential   Result Value Ref Range    WBC 11.5 (H) 4.0 - 11.0 10e9/L    RBC Count 2.91 (L) 4.4 - 5.9 10e12/L    Hemoglobin 8.9 (L) 13.3 - 17.7 g/dL    Hematocrit 25.5 (L) 40.0 - 53.0 %    MCV 88 78 - 100 fl    MCH 30.6 26.5 - 33.0 pg    MCHC 34.9 31.5 - 36.5 g/dL    RDW 12.6 10.0 - 15.0 %    Platelet Count 311 150 - 450 10e9/L    Diff Method Automated Method     % Neutrophils 64.9 %    % Lymphocytes 22.1 %    % Monocytes 7.8 %    % Eosinophils 0.9 %    % Basophils 0.3 %    % Immature Granulocytes 4.0 %    Nucleated RBCs 0 0 /100    Absolute Neutrophil 7.5 1.6 - 8.3 10e9/L    Absolute Lymphocytes 2.5 0.8 - 5.3 10e9/L    Absolute Monocytes 0.9 0.0 - 1.3 10e9/L    Absolute Eosinophils 0.1 0.0 - 0.7 10e9/L    Absolute Basophils 0.0 0.0 - 0.2 10e9/L    Abs Immature Granulocytes 0.5 (H) 0 - 0.4 10e9/L    Absolute Nucleated RBC 0.0    Basic metabolic panel   Result Value Ref Range    Sodium 141 133 - 144 mmol/L    Potassium 4.1 3.4 - 5.3 mmol/L    Chloride 111 (H) 94 - 109 mmol/L    Carbon Dioxide 25 20 - 32 mmol/L    Anion Gap 5 3 - 14 mmol/L    Glucose 220 (H) 70 - 99 mg/dL    Urea Nitrogen 9 7 - 30 mg/dL    Creatinine 0.65 (L) 0.66 - 1.25 mg/dL    GFR Estimate >90  Non  GFR Calc   >60 mL/min/1.7m2    GFR Estimate If Black  >90   GFR Calc   >60 mL/min/1.7m2    Calcium 8.1 (L) 8.5 - 10.1 mg/dL   Glucose by meter   Result Value Ref Range    Glucose 197 (H) 70 - 99 mg/dL   Glucose by meter   Result Value Ref Range    Glucose 193 (H) 70 - 99 mg/dL   Glucose by meter   Result Value Ref Range    Glucose 170 (H) 70 - 99 mg/dL   Glucose by meter   Result Value Ref Range    Glucose 195 (H) 70 - 99 mg/dL   Glucose by meter   Result Value Ref Range    Glucose 234 (H) 70 - 99 mg/dL   Wound Ostomy Continence Nurse IP Consult    Narrative    Priya Cheek RN     3/24/2017 10:33 AM  Pt seen in OR during dressing change by Dr De Jesus for wound vac   placement.  [facility] insurance for coverage of the wound vac at home is   pending.  There is an approval from the [facility] for [facility] wound center   visits; documents were faxed to the [facility] for approval of the DME   (equipment and dressing kits)    Pt wound was measured by Dr De Jesus.  The wound bed is red,   moist with scattered areas from cautery.  The hair around the   wound was clipped and prepped with a skin barrier wipe.  Draping   was placed under the bridge foam. One piece of black foam was   placed into the wound in addition to a bridge to the side and a   disc landing.   Draping applied and seal check confirmed along   with wound vac settings of 125, low, continuous.       Wound Culture Aerobic Bacterial   Result Value Ref Range    Specimen Description Back     Culture Micro No growth after 7 days     Micro Report Status FINAL 03/29/2017    Gram stain   Result Value Ref Range    Specimen Description Back     Gram Stain (A)      Moderate PMNs seen  Moderate Epithelial cells seen  Moderate Gram positive cocci      Micro Report Status FINAL 03/22/2017    Cyst Culture Aerobic Bacterial   Result Value Ref Range    Specimen Description Cyst     Culture Micro       Canceled, Test credited  Incorrectly ordered by PCU/Clinic      Micro Report Status FINAL 03/22/2017    Gram stain   Result Value  Ref Range    Specimen Description Cyst     Gram Stain       Canceled, Test credited  Incorrectly ordered by U/Clinic      Micro Report Status FINAL 03/22/2017    Anaerobic bacterial culture   Result Value Ref Range    Specimen Description Cyst     Culture Micro       Canceled, Test credited  Incorrectly ordered by U/Clinic      Micro Report Status FINAL 03/22/2017    Fluid Culture Aerobic Bacterial   Result Value Ref Range    Specimen Description Cyst     Culture Micro       Canceled, Test credited  Incorrectly ordered by U/Clinic      Micro Report Status FINAL 03/22/2017    Gram stain   Result Value Ref Range    Specimen Description Cyst     Gram Stain       Canceled, Test credited  Incorrectly ordered by U/Clinic      Micro Report Status FINAL 03/22/2017    Anaerobic bacterial culture   Result Value Ref Range    Specimen Description Tissue Back Abscess     Culture Micro No anaerobes isolated     Micro Report Status FINAL 04/01/2017    Gram stain   Result Value Ref Range    Specimen Description Tissue Back Abscess     Gram Stain (A)      Many WBC'S seen  Moderate Gram positive cocci in clusters  Moderate Gram positive cocci in pairs      Micro Report Status FINAL 03/22/2017    Tissue Culture Aerobic Bacterial   Result Value Ref Range    Specimen Description Back Abscess     Culture Micro (A)      Light growth Coagulase negative Staphylococcus No further identification or   sensitivity done Possible skin contaminant.  Critical Value called to and read back by Luly Estevez at 0700 on 3/26/2017 by   Velvet Haro      Micro Report Status FINAL 03/27/2017      *Note: Due to a large number of results and/or encounters for the requested time period, some results have not been displayed. A complete set of results can be found in Results Review.       ASSESSMENT / PLAN:  1. Dehiscence of surgical wound, subsequent encounter  Wound base looks great.    Follow up as previously scheduled.        Patient Instructions    Continue with Monday, Wednesday, and Friday NPWT dressing changes at the [facility].      Please call if any questions/concerns and/or problems ([phone])    Follow up as previously scheduled.        Three building blocks of wound healing  1. Protein (if not contraindicated)  2. Vitamin C 500 mg oral twice a day  3.  Zinc 220 mg oral daily         Time: 40 minutes  Barrier: none  Willingness to learn: accepting    Loly VIZCAINO FNP-BC  Disease Management

## 2017-04-20 NOTE — PATIENT INSTRUCTIONS
Continue with Monday, Wednesday, and Friday NPWT dressing changes at the [facility].      Please call if any questions/concerns and/or problems ([phone])    Follow up as previously scheduled.        Three building blocks of wound healing  1. Protein (if not contraindicated)  2. Vitamin C 500 mg oral twice a day  3.  Zinc 220 mg oral daily

## 2017-04-24 ENCOUNTER — HOSPITAL ENCOUNTER (EMERGENCY)
Facility: HOSPITAL | Age: 72
Discharge: HOME | End: 2017-04-24
Attending: PHYSICIAN ASSISTANT | Admitting: PHYSICIAN ASSISTANT
Payer: COMMERCIAL

## 2017-04-24 ENCOUNTER — HOSPITAL ENCOUNTER (OUTPATIENT)
Dept: WOUND CARE | Facility: HOSPITAL | Age: 72
Discharge: HOME | End: 2017-04-24
Attending: FAMILY MEDICINE | Admitting: FAMILY MEDICINE
Payer: COMMERCIAL

## 2017-04-24 VITALS — DIASTOLIC BLOOD PRESSURE: 84 MMHG | SYSTOLIC BLOOD PRESSURE: 136 MMHG | HEART RATE: 80 BPM | TEMPERATURE: 99.1 F

## 2017-04-24 VITALS
TEMPERATURE: 97.9 F | OXYGEN SATURATION: 99 % | SYSTOLIC BLOOD PRESSURE: 162 MMHG | DIASTOLIC BLOOD PRESSURE: 96 MMHG | RESPIRATION RATE: 16 BRPM

## 2017-04-24 DIAGNOSIS — S39.012A BACK STRAIN, INITIAL ENCOUNTER: ICD-10-CM

## 2017-04-24 DIAGNOSIS — Z86.69 HISTORY OF SCIATICA: ICD-10-CM

## 2017-04-24 PROCEDURE — 97606 NEG PRS WND THER DME>50 SQCM: CPT

## 2017-04-24 PROCEDURE — 96372 THER/PROPH/DIAG INJ SC/IM: CPT

## 2017-04-24 PROCEDURE — 99214 OFFICE O/P EST MOD 30 MIN: CPT | Mod: 25

## 2017-04-24 PROCEDURE — 99213 OFFICE O/P EST LOW 20 MIN: CPT | Performed by: PHYSICIAN ASSISTANT

## 2017-04-24 PROCEDURE — 25000128 H RX IP 250 OP 636: Performed by: PHYSICIAN ASSISTANT

## 2017-04-24 RX ORDER — DIAZEPAM 10 MG/2ML
10 INJECTION, SOLUTION INTRAMUSCULAR; INTRAVENOUS ONCE
Status: COMPLETED | OUTPATIENT
Start: 2017-04-24 | End: 2017-04-24

## 2017-04-24 RX ORDER — KETOROLAC TROMETHAMINE 30 MG/ML
30 INJECTION, SOLUTION INTRAMUSCULAR; INTRAVENOUS ONCE
Status: COMPLETED | OUTPATIENT
Start: 2017-04-24 | End: 2017-04-24

## 2017-04-24 RX ORDER — HYDROCODONE BITARTRATE AND ACETAMINOPHEN 5; 325 MG/1; MG/1
1 TABLET ORAL EVERY 6 HOURS PRN
COMMUNITY
End: 2017-07-25

## 2017-04-24 RX ORDER — DIAZEPAM 5 MG
5 TABLET ORAL EVERY 8 HOURS PRN
Qty: 9 TABLET | Refills: 0 | Status: SHIPPED | OUTPATIENT
Start: 2017-04-24 | End: 2017-05-11

## 2017-04-24 RX ORDER — KETOROLAC TROMETHAMINE 10 MG/1
10 TABLET, FILM COATED ORAL EVERY 6 HOURS PRN
Qty: 20 TABLET | Refills: 0 | Status: SHIPPED | OUTPATIENT
Start: 2017-04-24 | End: 2017-04-29

## 2017-04-24 RX ADMIN — DIAZEPAM 10 MG: 5 INJECTION, SOLUTION INTRAMUSCULAR; INTRAVENOUS at 16:25

## 2017-04-24 RX ADMIN — KETOROLAC TROMETHAMINE 30 MG: 30 INJECTION, SOLUTION INTRAMUSCULAR; INTRAVENOUS at 16:25

## 2017-04-24 ASSESSMENT — ENCOUNTER SYMPTOMS
NUMBNESS: 0
FEVER: 0
CONSTITUTIONAL NEGATIVE: 1
CHILLS: 0
RESPIRATORY NEGATIVE: 1
DIFFICULTY URINATING: 0
CARDIOVASCULAR NEGATIVE: 1
WOUND: 1
BACK PAIN: 1
ROS GI COMMENTS: NO LOSS OF BOWEL CONTROL
GASTROINTESTINAL NEGATIVE: 1
WEAKNESS: 0
PSYCHIATRIC NEGATIVE: 1

## 2017-04-24 NOTE — PROGRESS NOTES
"Trevor Alexandre, 1945  Chief Complaint   Patient presents with     WOUND CARE     Open wound of back/Abscess of back       Patient Active Problem List   Diagnosis     Abscess of back     Open wound of back     Dehiscence of surgical wound, subsequent encounter       Concerns/Comments since last visits: Reports severe pain due to Sciatica - started over the weekend and has gotten worse.  Tried his Oxycodone as he had some left from his Rx post surgically, but says it really hasn't helped him.  Is going to be seen by Urgent Care following this appt.  His dtr drove him here as he is unable to drive due to the pain     04/24/17 1400   Wound 03/27/17 Back   Date First Assessed/Time First Assessed: 03/27/17 0810   Location: Back   Site Assessment Red;Granulation tissue   Raisa-wound Assessment WDL   Size - Length x Width x Depth (cm) 8.4 x 6.4 x 1.2   Drainage Amount Small   Drainage Color/Charcteristics Yellow;Green;Serosanguinous   Wound Care/Cleansing Soap and water;Irrigated   Dressing Vacuum based   Dressing Status New dressing   Wound Description (WOC) Full thickness   State of Healing (WOC) Early/partial granulation   Wound/Skin Edges (WOC) Attached edges   Negative Pressure Wound Therapy Back Mid   Placement Date/Time: 03/24/17 0758   Inserted by: Dr De Jesus / Priya Galicia RN  Location: Back  Wound Location Orientation: Mid   Wound Type Surgical   Unit Type KCI   Dressing Type Black foam   Number of pieces used 2   Cycle Continuous   Target Pressure (mmHg) 125   Intensity (low)   Cannister changed? Yes       Procedures  Wound vac dressing change done.  Removed 2 black foam.  Cleansed with soap and water and irrigated with saline. Raisa wound skin protected with skin barrier wipe and Duoderm CGF.  Inserted 2 black foam.  Draped wound.  Confirmed seal check and vac settings (125 mm/hg, low, continuous)  prior to departure.    Assessments  Pt reported severe back pain \"sciatica\"  Continues to have some " yellow/green tinged drainage in the canister - no odor - this is the norm for [place]  Wound steadily improving especially in depth - there is one deeper area on left center of wound base    Plan of care:   Pt to be seen in Urgent Care for c/o sciatica  Continue 2X/week wound vac dressing changes    Treatment Goals:  Enhanced granulation with Negative Pressure Wound Therapy  Prevent infection    Supplies provided:  n/a    Education:  Wound care instructions/education provided. Patient verbalized understanding of instruction/education.    Nurse face to face time: 35 min    CC: Jeremy Glasgow/Gerson Al

## 2017-04-24 NOTE — DISCHARGE INSTRUCTIONS
- Oral muscle relaxant/Valium.   - Toradol orally for 3 days, up to 5 days, for pain/swelling. Take this 4x daily for 3 days minimum.    - FYI Topicals:   1. Heat packs to low back, ice to side of hip  2. Arnica Cream (5$ at [employer])   3. Capsaicin. (1/4 teaspoon cayenne pepper in a palmful olive oil and rub in 2-3 times daily for 5-7 days for pain). Capsaicin - may get hot, so test it on a smaller area of skin    - Be mindful of lifting and posture.  - As soon as tolerated, stretch.  - Consider chiropractor, massage, acupuncture.    * Most back pain will go away on it's own in 6-8 weeks. Follow up with family practice with persistent symptoms in 7-10 days.   ** Must be rechecked with: numbness in groin, loss of bowel or bladder function. ANY fevers, pain in the upper back or signs of shingles.

## 2017-04-24 NOTE — ED AVS SNAPSHOT
[facility]    [address] [place] [address]    [place] [address]    Phone:  [phone]                                       Trevor Alexandre   MRN: [id number]    Department:  [facility]   Date of Visit:  4/24/2017           After Visit Summary Signature Page     I have received my discharge instructions, and my questions have been answered. I have discussed any challenges I see with this plan with the nurse or doctor.    ..........................................................................................................................................  Patient/Patient Representative Signature      ..........................................................................................................................................  Patient Representative Print Name and Relationship to Patient    ..................................................               ................................................  Date                                            Time    ..........................................................................................................................................  Reviewed by Signature/Title    ...................................................              ..............................................  Date                                                            Time

## 2017-04-24 NOTE — ED PROVIDER NOTES
History     Chief Complaint   Patient presents with     Leg Pain     rt hip and leg pain, c/o sciatica     The history is provided by the patient. No  was used.     Trevor Alexandre is a 71 year old male who presents with right sided flare up of sciatica. He notes this occurred in the past and took 2 weeks to resolve. Pain starts in the right low back, radiates into the hip and down to the knee. He denies loss of bowel/bladder control. He denies Hx kidney stones. He denies fevers. He did have a surgical excision of a large sebaceous cyst about 4 weeks ago, had a wound vac placed and has been following up with wound clinic. NO signs of infection at wound visit. NO fevers. He thinks he has been weight bearing funny carrying around the wound vac and trying to position himself at home causing the flare up in back/leg pain. He had left over oxycodone from surgery, tried this for pain, but just not helping much.     I have reviewed the Medications, Allergies, Past Medical and Surgical History, and Social History in the Epic system.    Review of Systems   Constitutional: Negative.  Negative for chills and fever.   Respiratory: Negative.    Cardiovascular: Negative.    Gastrointestinal: Negative.         No loss of bowel control   Genitourinary: Negative for difficulty urinating.   Musculoskeletal: Positive for back pain and gait problem.   Skin: Positive for wound (was seen by wound care today, no concern for infection). Negative for rash.   Neurological: Negative for weakness and numbness.   Psychiatric/Behavioral: Negative.        Physical Exam   BP: 162/96  Heart Rate: 87  Temp: 97.9  F (36.6  C)  Resp: 16  SpO2: 99 %  Physical Exam   Constitutional: He is oriented to person, place, and time. He appears well-developed and well-nourished. No distress (pt appears uncomfortable, standing leaning on his walker).   Cardiovascular: Normal rate.    Pulmonary/Chest: Effort normal.   Musculoskeletal:         Right hip: He exhibits decreased range of motion (due to pain) and tenderness (as indicated). He exhibits normal strength.        Thoracic back: He exhibits normal range of motion, no tenderness, no swelling, no edema and no pain.        Lumbar back: He exhibits decreased range of motion (due to pain), tenderness (as indicated right low back/buttock) and pain. He exhibits no bony tenderness, no swelling, no edema, no deformity and no spasm.        Back:         Legs:  Exam is somewhat limited due to pain and wound. He is able to flex forward 30 degrees until pain starts. Sensation intact to pressure and light touch at all digits. Wiggles digits.     Neurological: He is alert and oriented to person, place, and time.   Skin: Skin is warm and dry.   Psychiatric: He has a normal mood and affect.   Nursing note and vitals reviewed.      ED Course     ED Course     Procedures  Medications   ketorolac (TORADOL) injection 30 mg (30 mg Intramuscular Given 4/24/17 1625)   diazepam (VALIUM) injection 10 mg (10 mg Intramuscular Given 4/24/17 1625)   Patient tolerated. Patient observed for 20 minutes.     Assessments & Plan (with Medical Decision Making)     I have reviewed the nursing notes.    I have reviewed the findings, diagnosis, plan and need for follow up with the patient.    Discharge Medication List as of 4/24/2017  5:14 PM      START taking these medications    Details   ketorolac (TORADOL) 10 MG tablet Take 1 tablet (10 mg) by mouth every 6 hours as needed for moderate pain, Disp-20 tablet, R-0, E-Prescribe      diazepam (VALIUM) 5 MG tablet Take 1 tablet (5 mg) by mouth every 8 hours as needed for muscle spasms or pain (MUSCLE SPASM), Disp-9 tablet, R-0, Local Print             Final diagnoses:   History of sciatica   Back strain, initial encounter   Significant improvement after IM toradol and valium as above. Pt again denies thoracic back pain and fevers (sebaceous cyst removal requiring surgical debridement in OR  last month near thoracic spine). He will start oral toradol for 3-5 days, valium for 1-2 days and monitor skin for shingles despite feeling like flare up of sciatica. Follow up with Primary Care Provider in 5-7 days with poor improvement. Seek attention sooner with worsening despite treatment, fevers, or concerns. Patient and daughter verbally educated and given appropriate education sheets for each of the diagnoses and has no questions.    Alexey Valerio PA-C   4/24/2017   5:36 PM    4/24/2017   [facility]     Alexey Valerio PA  04/24/17 1737

## 2017-04-24 NOTE — ED NOTES
Pt presents with sciatic pain in Rt hip and Rt leg. Started Thursday, but worsened since Saturday. Pt took Oxy during the night and a hydrocodone around 1200 today. No relief from pain meds. Pain 9/10. Hurts to sit down.

## 2017-04-24 NOTE — ED AVS SNAPSHOT
[facility]    [address] [place] [id number]    Phone:  [phone]                                       Trevor Alexandre   MRN: [id number]    Department:  [facility]   Date of Visit:  4/24/2017           Patient Information     Date Of Birth          1945        Your diagnoses for this visit were:     History of sciatica     Back strain, initial encounter        You were seen by Alexey Valerio PA.      Follow-up Information     Follow up with Jeremy Glasgow DO.    Why:  3-5 days if pain ongoing despite treatment - sooner with anything on the list    Contact information:    [facility]  [address]  [facility] [id number] [phone]          Discharge Instructions       - Oral muscle relaxant/Valium.   - Toradol orally for 3 days, up to 5 days, for pain/swelling. Take this 4x daily for 3 days minimum.    - FYI Topicals:   1. Heat packs to low back, ice to side of hip  2. Arnica Cream (5$ at [facility] [employer] [facility])   3. Capsaicin. (1/4 teaspoon cayenne pepper in a palmful olive oil and rub in 2-3 times daily for 5-7 days for pain). Capsaicin - may get hot, so test it on a smaller area of skin    - Be mindful of lifting and posture.  - As soon as tolerated, stretch.  - Consider chiropractor, massage, acupuncture.    * Most back pain will go away on it's own in 6-8 weeks. Follow up with family practice with persistent symptoms in 7-10 days.   ** Must be rechecked with: numbness in groin, loss of bowel or bladder function. ANY fevers, pain in the upper back or signs of shingles.          Discharge References/Attachments     BACK SPRAIN/STRAIN (ENGLISH)      Future Appointments        Provider Department Dept Phone Center    4/27/2017 1:00 PM [facility] [phone] [facility]    5/1/2017 1:00 PM [facility] [phone] [facility]    5/4/2017 1:00 PM [facility] [phone] [facility]    5/8/2017 2:00 PM [facility] W[facility] Ostomy [phone] [facility]    5/11/2017 1:00 PM [phone] [facility]         Review of your medicines      START taking        Dose / Directions Last dose taken    diazepam 5 MG tablet   Commonly known as:  VALIUM   Dose:  5 mg   Quantity:  9 tablet        Take 1 tablet (5 mg) by mouth every 8 hours as needed for muscle spasms or pain (MUSCLE SPASM)   Refills:  0        ketorolac 10 MG tablet   Commonly known as:  TORADOL   Dose:  10 mg   Quantity:  20 tablet        Take 1 tablet (10 mg) by mouth every 6 hours as needed for moderate pain   Refills:  0          Our records show that you are taking the medicines listed below. If these are incorrect, please call your family doctor or clinic.        Dose / Directions Last dose taken    GLIPIZIDE PO   Dose:  7.5 mg        Take 7.5 mg by mouth 2 times daily (before meals)   Refills:  0        HYDROcodone-acetaminophen 5-325 MG per tablet   Commonly known as:  NORCO   Dose:  1 tablet        Take 1 tablet by mouth every 6 hours as needed for moderate to severe pain (for arthritis)   Refills:  0        LISINOPRIL PO   Dose:  5 mg        Take 5 mg by mouth daily   Refills:  0        metFORMIN 500 MG 24 hr tablet   Commonly known as:  GLUCOPHAGE-XR   Dose:  2000 mg        Take 2,000 mg by mouth daily   Refills:  0        oxyCODONE 5 MG IR tablet   Commonly known as:  ROXICODONE   Dose:  5-10 mg   Quantity:  30 tablet        Take 1-2 tablets (5-10 mg) by mouth every 6 hours as needed for moderate to severe pain (1 hour before dressing change)   Refills:  0                Prescriptions were sent or printed at these locations (2 Prescriptions)                   [facility] [address] - [place], [place] - [address]   [address], [facility] [address]    Telephone:  [phone]   Fax:  [phone]   Hours:                  E-Prescribed (1 of 2)         ketorolac  "(TORADOL) 10 MG tablet                 Printed at Department/Unit printer (1 of 2)         diazepam (VALIUM) 5 MG tablet                Orders Needing Specimen Collection     None      Pending Results     No orders found from 2017 to 2017.            Pending Culture Results     No orders found from 2017 to 2017.            Thank you for choosing [facility]       Thank you for choosing [facility] for your care. Our goal is always to provide you with excellent care. Hearing back from our patients is one way we can continue to improve our services. Please take a few minutes to complete the written survey that you may receive in the mail after you visit with us. Thank you!        [employer] Information     [facility] lets you send messages to your doctor, view your test results, renew your prescriptions, schedule appointments and more. To sign up, go to [email or web address] . Click on \"Log in\" on the left side of the screen, which will take you to the Welcome page. Then click on \"Sign up Now\" on the right side of the page.     You will be asked to enter the access code listed below, as well as some personal information. Please follow the directions to create your username and password.     Your access code is: [email or web address]  Expires: 2017  3:18 PM     Your access code will  in 90 days. If you need help or a new code, please call your [facility] clinic or [phone].        Care EveryWhere ID     This is your Care EveryWhere ID. This could be used by other organizations to access your [facility] medical records  [id number]        After Visit Summary       This is your record. Keep this with you and show to your community pharmacist(s) and doctor(s) at your next visit.                  "

## 2017-04-27 ENCOUNTER — OFFICE VISIT (OUTPATIENT)
Dept: WOUND CARE | Facility: OTHER | Age: 72
End: 2017-04-27
Attending: FAMILY MEDICINE
Payer: COMMERCIAL

## 2017-04-27 VITALS
HEART RATE: 72 BPM | WEIGHT: 213 LBS | DIASTOLIC BLOOD PRESSURE: 84 MMHG | BODY MASS INDEX: 29.82 KG/M2 | TEMPERATURE: 98.5 F | SYSTOLIC BLOOD PRESSURE: 148 MMHG | HEIGHT: 71 IN

## 2017-04-27 DIAGNOSIS — T81.31XD DEHISCENCE OF SURGICAL WOUND, SUBSEQUENT ENCOUNTER: Primary | ICD-10-CM

## 2017-04-27 PROCEDURE — 97606 NEG PRS WND THER DME>50 SQCM: CPT | Performed by: NURSE PRACTITIONER

## 2017-04-27 PROCEDURE — 99213 OFFICE O/P EST LOW 20 MIN: CPT | Performed by: NURSE PRACTITIONER

## 2017-04-27 NOTE — PROGRESS NOTES
"SUBJECTIVE:  Trevor Alexandre, 71 year old, male presents with the following Chief Complaint(s) with HPI to follow:  Chief Complaint   Patient presents with     WOUND CARE        Wound Care    HPI:  Trevor is here today for the reassessment and treatment of his back wound.  Back story:  Irving states that he has had a longstanding lump in the area, which was present for years.    At the end of February, he accidentally struck the area on a door.  The lump got bigger and painful.    [facility]   He was diagnosed with infection and started on minocycline.   Shortly after leaving the clinic, the area \"opened up\" and drained a copious amount of fluid.   It kept draining the pain worsened.     [facility]   Despite this, the pain and drainage continued.    [facility] and [facility] to  here.    Irving had the infected sebaceous cyst removed on 3/22/17. He had NPWT (negative pressure wound therapy-wound vac) placed on 3/24/17.   He continues having the NPWT dressing changed here at the [facility] center every Monday and Thursday.  He has no pain in the area.   Denies any fevers, chills, and/or malodorous drainage.     Irving's biggest complaint today is his right leg/back pain.    He's had issues with it in the past.    Last week, he exacerbated it lifting some gas can.    [facility]      Patient Active Problem List   Diagnosis     Abscess of back     Open wound of back     Dehiscence of surgical wound, subsequent encounter       Past Medical History:   Diagnosis Date     Abscess of back 03/23/2017     Open wound of back 03/23/2017       Past Surgical History:   Procedure Laterality Date     EXAM UNDER ANESTHESIA, CHANGE DRESSING (LOCATION), COMBINED N/A 3/24/2017    Procedure: COMBINED EXAM UNDER ANESTHESIA, CHANGE DRESSING (LOCATION);  Surgeon: Tahir De Jesus DO;  Location: [facility]     EXCISE LESION BACK " "N/A 3/22/2017    Procedure: EXCISE LESION BACK;  Surgeon: Servando Dale MD;  Location: [facility]     IRRIGATION AND DEBRIDEMENT TRUNK, COMBINED N/A 3/23/2017    Procedure: COMBINED IRRIGATION AND DEBRIDEMENT TRUNK;  Surgeon: Servando Dale MD;  Location: [facility]       No family history on file.    Social History   Substance Use Topics     Smoking status: Former Smoker     Types: Cigarettes     Quit date: 3/27/2002     Smokeless tobacco: Not on file     Alcohol use Not on file       Current Outpatient Prescriptions   Medication Sig Dispense Refill     HYDROcodone-acetaminophen (NORCO) 5-325 MG per tablet Take 1 tablet by mouth every 6 hours as needed for moderate to severe pain (for arthritis)       ketorolac (TORADOL) 10 MG tablet Take 1 tablet (10 mg) by mouth every 6 hours as needed for moderate pain 20 tablet 0     diazepam (VALIUM) 5 MG tablet Take 1 tablet (5 mg) by mouth every 8 hours as needed for muscle spasms or pain (MUSCLE SPASM) 9 tablet 0     oxyCODONE (ROXICODONE) 5 MG IR tablet Take 1-2 tablets (5-10 mg) by mouth every 6 hours as needed for moderate to severe pain (1 hour before dressing change) 30 tablet 0     metFORMIN (GLUCOPHAGE-XR) 500 MG 24 hr tablet Take 2,000 mg by mouth daily       GLIPIZIDE PO Take 7.5 mg by mouth 2 times daily (before meals)       LISINOPRIL PO Take 5 mg by mouth daily         No Known Allergies    REVIEW OF SYSTEMS  Skin: as noted above  Eyes: negative  Ears/Nose/Throat: negative  Respiratory: No shortness of breath, dyspnea on exertion, cough, or hemoptysis  Cardiovascular: negative  Gastrointestinal: negative  Genitourinary: negative  Musculoskeletal: positive for right leg/back pain; history of sciatica   Neurologic: negative  Psychiatric: negative  Hematologic/Lymphatic/Immunologic: negative  Endocrine: diabetes    OBJECTIVE:  /84  Pulse 72  Temp 98.5  F (36.9  C)  Ht 5' 10.5\" (1.791 m)  Wt 213 lb (96.6 kg)  BMI 30.13 kg/m2  Constitutional: healthy, alert " and mild distress related to pain  Cardiovascular: RRR. No murmurs, clicks gallops or rub  Respiratory: Good diaphragmatic excursion. Lungs clear  Skin:   Wound description: Type of Wound- surgical; Location- upper/mid back , Drainage amount-small/moderate, Drainage color-reddish/tan and dark green, Odor- none; Wound bed-100% red and granulating, Surrounding skin-intact, resolving folliculitis .  Measurements: not done today (last measurement: 8.4 x 6.4 x 1.2 on 4/24/17)   Dressing change:   Removed black foam, 2 pieces.    Wound cleansed with soap and water.    Applied skin barrier wipe and duoderm around periwound edges.      Dressed with 3 pieces of black foam (placed collagen into divot, place foam in the little divot, a full piece over the wound, and one to his left side as the full piece didn't cover it), pump set at 125 mm/hg continuous. Good seal obtained.        Psychiatric: mentation appears normal and affect normal/bright    LABS  Results for orders placed or performed during the hospital encounter of 03/22/17   CBC with platelets differential   Result Value Ref Range    WBC 14.5 (H) 4.0 - 11.0 10e9/L    RBC Count 4.91 4.4 - 5.9 10e12/L    Hemoglobin 14.4 13.3 - 17.7 g/dL    Hematocrit 42.8 40.0 - 53.0 %    MCV 87 78 - 100 fl    MCH 29.3 26.5 - 33.0 pg    MCHC 33.6 31.5 - 36.5 g/dL    RDW 12.5 10.0 - 15.0 %    Platelet Count 385 150 - 450 10e9/L    Diff Method Automated Method     % Neutrophils 74.6 %    % Lymphocytes 13.2 %    % Monocytes 8.7 %    % Eosinophils 0.8 %    % Basophils 0.5 %    % Immature Granulocytes 2.2 %    Nucleated RBCs 0 0 /100    Absolute Neutrophil 10.8 (H) 1.6 - 8.3 10e9/L    Absolute Lymphocytes 1.9 0.8 - 5.3 10e9/L    Absolute Monocytes 1.3 0.0 - 1.3 10e9/L    Absolute Eosinophils 0.1 0.0 - 0.7 10e9/L    Absolute Basophils 0.1 0.0 - 0.2 10e9/L    Abs Immature Granulocytes 0.3 0 - 0.4 10e9/L    Absolute Nucleated RBC 0.0    Comprehensive metabolic panel   Result Value Ref Range     Sodium 134 133 - 144 mmol/L    Potassium 4.1 3.4 - 5.3 mmol/L    Chloride 99 94 - 109 mmol/L    Carbon Dioxide 26 20 - 32 mmol/L    Anion Gap 9 3 - 14 mmol/L    Glucose 190 (H) 70 - 99 mg/dL    Urea Nitrogen 6 (L) 7 - 30 mg/dL    Creatinine 0.73 0.66 - 1.25 mg/dL    GFR Estimate >90  Non  GFR Calc   >60 mL/min/1.7m2    GFR Estimate If Black >90   GFR Calc   >60 mL/min/1.7m2    Calcium 9.1 8.5 - 10.1 mg/dL    Bilirubin Total 0.4 0.2 - 1.3 mg/dL    Albumin 3.3 (L) 3.4 - 5.0 g/dL    Protein Total 8.4 6.8 - 8.8 g/dL    Alkaline Phosphatase 75 40 - 150 U/L    ALT 31 0 - 70 U/L    AST 12 0 - 45 U/L   CRP inflammation   Result Value Ref Range    CRP Inflammation 165.0 (H) 0.0 - 8.0 mg/L   Erythrocyte sedimentation rate auto   Result Value Ref Range    Sed Rate 44 (H) 0 - 20 mm/h   Glucose by meter   Result Value Ref Range    Glucose 166 (H) 70 - 99 mg/dL   Glucose by meter   Result Value Ref Range    Glucose 250 (H) 70 - 99 mg/dL   Glucose by meter   Result Value Ref Range    Glucose 439 (H) 70 - 99 mg/dL   Glucose by meter   Result Value Ref Range    Glucose 466 (H) 70 - 99 mg/dL   Glucose by meter   Result Value Ref Range    Glucose 441 (H) 70 - 99 mg/dL   Hemoglobin A1c   Result Value Ref Range    Hemoglobin A1C 8.0 (H) 4.3 - 6.0 %   CBC with platelets differential   Result Value Ref Range    WBC 14.2 (H) 4.0 - 11.0 10e9/L    RBC Count 3.28 (L) 4.4 - 5.9 10e12/L    Hemoglobin 9.5 (L) 13.3 - 17.7 g/dL    Hematocrit 28.8 (L) 40.0 - 53.0 %    MCV 88 78 - 100 fl    MCH 29.0 26.5 - 33.0 pg    MCHC 33.0 31.5 - 36.5 g/dL    RDW 12.5 10.0 - 15.0 %    Platelet Count 367 150 - 450 10e9/L    Diff Method Automated Method     % Neutrophils 69.5 %    % Lymphocytes 17.2 %    % Monocytes 9.0 %    % Eosinophils 0.2 %    % Basophils 0.4 %    % Immature Granulocytes 3.7 %    Nucleated RBCs 0 0 /100    Absolute Neutrophil 9.9 (H) 1.6 - 8.3 10e9/L    Absolute Lymphocytes 2.5 0.8 - 5.3 10e9/L    Absolute  Monocytes 1.3 0.0 - 1.3 10e9/L    Absolute Eosinophils 0.0 0.0 - 0.7 10e9/L    Absolute Basophils 0.1 0.0 - 0.2 10e9/L    Abs Immature Granulocytes 0.5 (H) 0 - 0.4 10e9/L    Absolute Nucleated RBC 0.0    Basic metabolic panel   Result Value Ref Range    Sodium 139 133 - 144 mmol/L    Potassium 4.3 3.4 - 5.3 mmol/L    Chloride 106 94 - 109 mmol/L    Carbon Dioxide 25 20 - 32 mmol/L    Anion Gap 8 3 - 14 mmol/L    Glucose 217 (H) 70 - 99 mg/dL    Urea Nitrogen 10 7 - 30 mg/dL    Creatinine 0.70 0.66 - 1.25 mg/dL    GFR Estimate >90  Non  GFR Calc   >60 mL/min/1.7m2    GFR Estimate If Black >90   GFR Calc   >60 mL/min/1.7m2    Calcium 8.1 (L) 8.5 - 10.1 mg/dL   Glucose by meter   Result Value Ref Range    Glucose 275 (H) 70 - 99 mg/dL   Glucose by meter   Result Value Ref Range    Glucose 205 (H) 70 - 99 mg/dL   Estimated Average Glucose   Result Value Ref Range    Estimated Average Glucose 183 mg/dL   Glucose by meter   Result Value Ref Range    Glucose 163 (H) 70 - 99 mg/dL   Glucose by meter   Result Value Ref Range    Glucose 186 (H) 70 - 99 mg/dL   Glucose by meter   Result Value Ref Range    Glucose 331 (H) 70 - 99 mg/dL   Glucose by meter   Result Value Ref Range    Glucose 341 (H) 70 - 99 mg/dL   CBC with platelets differential   Result Value Ref Range    WBC 11.5 (H) 4.0 - 11.0 10e9/L    RBC Count 2.91 (L) 4.4 - 5.9 10e12/L    Hemoglobin 8.9 (L) 13.3 - 17.7 g/dL    Hematocrit 25.5 (L) 40.0 - 53.0 %    MCV 88 78 - 100 fl    MCH 30.6 26.5 - 33.0 pg    MCHC 34.9 31.5 - 36.5 g/dL    RDW 12.6 10.0 - 15.0 %    Platelet Count 311 150 - 450 10e9/L    Diff Method Automated Method     % Neutrophils 64.9 %    % Lymphocytes 22.1 %    % Monocytes 7.8 %    % Eosinophils 0.9 %    % Basophils 0.3 %    % Immature Granulocytes 4.0 %    Nucleated RBCs 0 0 /100    Absolute Neutrophil 7.5 1.6 - 8.3 10e9/L    Absolute Lymphocytes 2.5 0.8 - 5.3 10e9/L    Absolute Monocytes 0.9 0.0 - 1.3 10e9/L     Absolute Eosinophils 0.1 0.0 - 0.7 10e9/L    Absolute Basophils 0.0 0.0 - 0.2 10e9/L    Abs Immature Granulocytes 0.5 (H) 0 - 0.4 10e9/L    Absolute Nucleated RBC 0.0    Basic metabolic panel   Result Value Ref Range    Sodium 141 133 - 144 mmol/L    Potassium 4.1 3.4 - 5.3 mmol/L    Chloride 111 (H) 94 - 109 mmol/L    Carbon Dioxide 25 20 - 32 mmol/L    Anion Gap 5 3 - 14 mmol/L    Glucose 220 (H) 70 - 99 mg/dL    Urea Nitrogen 9 7 - 30 mg/dL    Creatinine 0.65 (L) 0.66 - 1.25 mg/dL    GFR Estimate >90  Non  GFR Calc   >60 mL/min/1.7m2    GFR Estimate If Black >90   GFR Calc   >60 mL/min/1.7m2    Calcium 8.1 (L) 8.5 - 10.1 mg/dL   Glucose by meter   Result Value Ref Range    Glucose 197 (H) 70 - 99 mg/dL   Glucose by meter   Result Value Ref Range    Glucose 193 (H) 70 - 99 mg/dL   Glucose by meter   Result Value Ref Range    Glucose 170 (H) 70 - 99 mg/dL   Glucose by meter   Result Value Ref Range    Glucose 195 (H) 70 - 99 mg/dL   Glucose by meter   Result Value Ref Range    Glucose 234 (H) 70 - 99 mg/dL   Wound Ostomy Continence Nurse IP Consult    Narrative    Priya Cheek RN     3/24/2017 10:33 AM  Pt seen in OR during dressing change by Dr De Jesus for wound vac   placement.  [facility] insurance for coverage of the wound vac at home is   pending.  There is an approval from the [facility] for [facility]   visits; documents were faxed to the [facility] for approval of the DME   (equipment and dressing kits)    Pt wound was measured by Dr De Jesus.  The wound bed is red,   moist with scattered areas from cautery.  The hair around the   wound was clipped and prepped with a skin barrier wipe.  Draping   was placed under the bridge foam. One piece of black foam was   placed into the wound in addition to a bridge to the side and a   disc landing.   Draping applied and seal check confirmed along   with wound vac settings of 125, low, continuous.       Wound Culture Aerobic Bacterial   Result  Value Ref Range    Specimen Description Back     Culture Micro No growth after 7 days     Micro Report Status FINAL 03/29/2017    Gram stain   Result Value Ref Range    Specimen Description Back     Gram Stain (A)      Moderate PMNs seen  Moderate Epithelial cells seen  Moderate Gram positive cocci      Micro Report Status FINAL 03/22/2017    Cyst Culture Aerobic Bacterial   Result Value Ref Range    Specimen Description Cyst     Culture Micro       Canceled, Test credited  Incorrectly ordered by [facility]      Micro Report Status FINAL 03/22/2017    Gram stain   Result Value Ref Range    Specimen Description Cyst     Gram Stain       Canceled, Test credited  Incorrectly ordered by [facility]      Micro Report Status FINAL 03/22/2017    Anaerobic bacterial culture   Result Value Ref Range    Specimen Description Cyst     Culture Micro       Canceled, Test credited  Incorrectly ordered by [facility]      Micro Report Status FINAL 03/22/2017    Fluid Culture Aerobic Bacterial   Result Value Ref Range    Specimen Description Cyst     Culture Micro       Canceled, Test credited  Incorrectly ordered by [facility]      Micro Report Status FINAL 03/22/2017    Gram stain   Result Value Ref Range    Specimen Description Cyst     Gram Stain       Canceled, Test credited  Incorrectly ordered by [facility]      Micro Report Status FINAL 03/22/2017    Anaerobic bacterial culture   Result Value Ref Range    Specimen Description Tissue Back Abscess     Culture Micro No anaerobes isolated     Micro Report Status FINAL 04/01/2017    Gram stain   Result Value Ref Range    Specimen Description Tissue Back Abscess     Gram Stain (A)      Many WBC'S seen  Moderate Gram positive cocci in clusters  Moderate Gram positive cocci in pairs      Micro Report Status FINAL 03/22/2017    Tissue Culture Aerobic Bacterial   Result Value Ref Range    Specimen Description Back Abscess     Culture Micro (A)      Light growth Coagulase negative  Staphylococcus No further identification or   sensitivity done Possible skin contaminant.  Critical Value called to and read back by Luly Estevez at 0700 on 3/26/2017 by   Velvet Haro      Micro Report Status FINAL 03/27/2017      *Note: Due to a large number of results and/or encounters for the requested time period, some results have not been displayed. A complete set of results can be found in Results Review.       ASSESSMENT / PLAN:  (T81.31XD) Dehiscence of surgical wound, subsequent encounter  (primary encounter diagnosis)  Comment:   Wound base looks great.    Plan:  I did tuck a little collagen into the little divot located at 11 o'clock.    Follow up as previously scheduled.    Please see your [facility] provider for your right leg/back pain.      Patient Instructions   Continue with Monday and Thursday for NPWT dressing changes at the [facility].      Please call if any questions/concerns and/or problems ([phone])    Follow up as previously scheduled.        Please follow up with your [facility] provider to address right leg/back pain    Three building blocks of wound healing  1. Protein (if not contraindicated)  2. Vitamin C 500 mg oral twice a day  3.  Zinc 220 mg oral daily       Time: 40 minutes  Barrier: none  Willingness to learn: accepting    Loly VIZCAINO FNP-BC  Disease Management

## 2017-04-27 NOTE — PATIENT INSTRUCTIONS
Continue with Monday and Thursday for NPWT dressing changes at the [facility].      Please call if any questions/concerns and/or problems ([phone])    Follow up as previously scheduled.        Please follow up with your [facility] provider to address right leg/back pain    Three building blocks of wound healing  1. Protein (if not contraindicated)  2. Vitamin C 500 mg oral twice a day  3.  Zinc 220 mg oral daily

## 2017-05-01 ENCOUNTER — HOSPITAL ENCOUNTER (OUTPATIENT)
Dept: WOUND CARE | Facility: HOSPITAL | Age: 72
Discharge: HOME | End: 2017-05-01
Attending: FAMILY MEDICINE | Admitting: FAMILY MEDICINE
Payer: COMMERCIAL

## 2017-05-01 VITALS — SYSTOLIC BLOOD PRESSURE: 135 MMHG | TEMPERATURE: 98.6 F | HEART RATE: 84 BPM | DIASTOLIC BLOOD PRESSURE: 81 MMHG

## 2017-05-01 PROCEDURE — 97606 NEG PRS WND THER DME>50 SQCM: CPT

## 2017-05-01 NOTE — PROGRESS NOTES
Trevor Alexandre, 1945  Chief Complaint   Patient presents with     WOUND CARE       Patient Active Problem List   Diagnosis     Abscess of back     Open wound of back     Dehiscence of surgical wound, subsequent encounter       Concerns/Comments:   Trevor is here for reevaluation and tx of back abscess secondary to cyst infection. Per Trevor he has had a long standing quarter sized cyst to his back that in February became infected. This was debrided 3/22/2017 and a wound vac was placed on 3/24. Denies pain to back but is currently having tremendous hip pain that was recently dx as bursitis. [facility] a [facility] but is unsure of which one. Needed to replace vac canister because it was signaling it was full but the canister was not completely filled.    Objective:      05/01/17 1300   Wound 03/27/17 Back   Date First Assessed/Time First Assessed: 03/27/17 0810   Location: Back   Site Assessment Red;Granulation tissue   Raisa-wound Assessment WDL  (Few pinpoint pustules)   Drainage Amount Moderate   Drainage Color/Charcteristics Serosanguinous   Wound Care/Cleansing Soap and water;Normal saline   Dressing Vacuum based   Dressing Status New dressing   Wound Description (WOC) Full thickness   State of Healing (WOC) Early/partial granulation   Wound/Skin Edges (WOC) Attached edges   Negative Pressure Wound Therapy Back Mid   Placement Date/Time: 03/24/17 0758   Inserted by: Dr De Jesus / Priya Galicia RN  Location: Back  Wound Location Orientation: Mid   Wound Type Surgical   Unit Type KCI   Dressing Type Black foam   Number of pieces used 1   Cycle Continuous   Target Pressure (mmHg) 125   Intensity (Low)   Cannister changed? Yes   Dressing Status New dressing     Procedures:   Wound bed cleansed and evaluated. Clora Prep to periwound skin due to pustule formation. Hydrocolloid CGF to periwound skin. Dressed as described above.    Assessment/Response to tx:  New granulation tissue  formation and epithelialization.  No periwound erythema or excessive warmth; no odor.    Plan of care:   Continue NPWT tx as wound is improving.    Treatment Goal:  Continued granulation tissue formation.    Supplies provided:  Canisters and vac dressings ordered.    Education:  Wound care instructions/education provided. Patient verbalized understanding of instruction/education.    Nurse face to face time: 35min    CC: Jeremy Glasgow/Dr. De Jesus

## 2017-05-04 ENCOUNTER — HOSPITAL ENCOUNTER (OUTPATIENT)
Dept: WOUND CARE | Facility: HOSPITAL | Age: 72
Discharge: HOME | End: 2017-05-04
Attending: FAMILY MEDICINE | Admitting: FAMILY MEDICINE
Payer: COMMERCIAL

## 2017-05-04 VITALS — HEART RATE: 70 BPM | TEMPERATURE: 98.1 F | DIASTOLIC BLOOD PRESSURE: 73 MMHG | SYSTOLIC BLOOD PRESSURE: 132 MMHG

## 2017-05-04 PROCEDURE — 97606 NEG PRS WND THER DME>50 SQCM: CPT

## 2017-05-04 NOTE — PROGRESS NOTES
Trevor Alexandre, 1945  Chief Complaint   Patient presents with     WOUND CARE       Patient Active Problem List   Diagnosis     Abscess of back     Open wound of back     Dehiscence of surgical wound, subsequent encounter       Concerns/Comments:   Trevor is here for reevaluation and tx of back abscess secondary to cyst infection. Per Trevor he has had a long standing quarter sized cyst to his back that in February became infected. This was debrided 3/22/2017 and a wound vac was placed on 3/24. Denies pain to back wound but is currently having tremendous hip pain that was recently dx as bursitis. [facility] but [facility] still has not came by [facility]. Also has F/U with PT re: hip pain. No issues with wound vac dressing.    Objective:      05/04/17 1300   Wound 03/27/17 Back   Date First Assessed/Time First Assessed: 03/27/17 0810   Location: Back   Site Assessment Red;Granulation tissue   Raisa-wound Assessment WDL   Size - Length x Width x Depth (cm) 6.7x6.7x0.8   Drainage Amount Large   Drainage Color/Charcteristics Serosanguinous;Serous   Wound Care/Cleansing Soap and water;Wound cleanser   Dressing Vacuum based   Dressing Status New dressing   Wound Description (WOC) Full thickness   State of Healing (WOC) Early/partial granulation   Wound/Skin Edges (WOC) Attached edges   Negative Pressure Wound Therapy Back Mid   Placement Date/Time: 03/24/17 0758   Inserted by: Dr De Jesus / Priya Galicia RN  Location: Back  Wound Location Orientation: Mid   Wound Type Surgical   Unit Type KCI   Dressing Type Black foam   Number of pieces used 1   Cycle Continuous   Target Pressure (mmHg) 125   Intensity (Low)   Cannister changed? Yes   Dressing Status New dressing     Procedures:   Wound bed/periwound skin cleansed and evaluated.  Silver nitrate applied to hypergranular buds at the 9:00 edge of the wound bed.  Microcyn wound cleanser applied. Periwound prepped with barrier wipe and  hydrocolloid. Dressed as described above. Benzoine applied to distal periwound skin to assist with adhesion of Medipore tape to anchor vac tubing (pt has had issues with the tubing anchor rolling off).    Assessment/Response to tx:  Wound bed continues to granulate up.  More drainage this visit from wound with slightly cloudy appearance.  Measures smaller.     Plan of care:   Continue NPWT tx.  Return Monday for reevaluation.    Treatment Goal:  Granulation tissue formation.  Epithelial migration.    Supplies provided:  NA    Education:  Wound care instructions/education provided. Patient verbalized understanding of instruction/education.    Nurse face to face time: 35min    CC: Jeremy Glasgow

## 2017-05-08 ENCOUNTER — HOSPITAL ENCOUNTER (OUTPATIENT)
Dept: WOUND CARE | Facility: HOSPITAL | Age: 72
Discharge: HOME | End: 2017-05-08
Attending: FAMILY MEDICINE | Admitting: FAMILY MEDICINE
Payer: COMMERCIAL

## 2017-05-08 VITALS — TEMPERATURE: 98.9 F | HEART RATE: 78 BPM | DIASTOLIC BLOOD PRESSURE: 88 MMHG | SYSTOLIC BLOOD PRESSURE: 144 MMHG

## 2017-05-08 PROCEDURE — 97606 NEG PRS WND THER DME>50 SQCM: CPT

## 2017-05-08 NOTE — PROGRESS NOTES
Trevor Alexandre, 1945  Chief Complaint   Patient presents with     WOUND CARE       Patient Active Problem List   Diagnosis     Abscess of back     Open wound of back     Dehiscence of surgical wound, subsequent encounter       Concerns/Comments:   Trevor is here for reevaluation and tx of back abscess secondary to cyst infection. Per Trevor he has had a long standing quarter sized cyst to his back that in February became infected. This was debrided 3/22/2017 and a wound vac was placed on 3/24. Denies pain to back wound but is currently having tremendous hip pain that was recently dx as bursitis. Recently prescribed baclofen and diclofenac. Also has F/U with PT re: hip pain. No issues with wound vac dressing.    Objective:      05/08/17 1400   Wound 03/27/17 Back   Date First Assessed/Time First Assessed: 03/27/17 0810   Location: Back   Site Assessment Red;Granulation tissue   Raisa-wound Assessment (Scattered pustules)   Drainage Amount Large   Drainage Color/Charcteristics Serosanguinous;Serous   Wound Care/Cleansing Soap and water   Dressing Vacuum based   Dressing Status New dressing   Wound Description (WOC) Full thickness   State of Healing (WOC) Early/partial granulation   Wound/Skin Edges (WOC) Attached edges   Negative Pressure Wound Therapy Back Mid   Placement Date/Time: 03/24/17 0758   Inserted by: Dr De Jesus / Priya Galicia RN  Location: Back  Wound Location Orientation: Mid   Wound Type Surgical   Unit Type KCI   Dressing Type Black foam   Number of pieces used 1   Cycle Continuous   Target Pressure (mmHg) 125   Intensity (Low)   Cannister changed? Yes   Dressing Status New dressing     Procedures:   Wound bed and periwound skin cleansed and evaluated. Pustules ruptured during cleansing. Cloraprep applied to periwound skin. NPWT dressing applied. Adequate seal obtained.    Assessment/Response to tx:  Wound bed continues to granulate up.  Hypergranular buds have resolved.  Periwound  pustules.    Plan of care:   Continue NPWT.  Return Thursday.    Treatment Goal:  Resolution of pustules.  Continued granulation.    Supplies provided:  NA    Education:  Wound care instructions/education provided. Patient verbalized understanding of instruction/education.    Nurse face to face time: 40min    CC: Jeremy Glasgow/Dr. Dale.

## 2017-05-11 ENCOUNTER — OFFICE VISIT (OUTPATIENT)
Dept: WOUND CARE | Facility: OTHER | Age: 72
End: 2017-05-11
Attending: NURSE PRACTITIONER
Payer: COMMERCIAL

## 2017-05-11 VITALS
HEIGHT: 71 IN | BODY MASS INDEX: 29.54 KG/M2 | DIASTOLIC BLOOD PRESSURE: 91 MMHG | SYSTOLIC BLOOD PRESSURE: 149 MMHG | TEMPERATURE: 98 F | HEART RATE: 72 BPM | WEIGHT: 211 LBS

## 2017-05-11 DIAGNOSIS — T81.31XD DEHISCENCE OF SURGICAL WOUND, SUBSEQUENT ENCOUNTER: Primary | ICD-10-CM

## 2017-05-11 PROCEDURE — 97605 NEG PRS WND THER DME<=50SQCM: CPT | Performed by: NURSE PRACTITIONER

## 2017-05-11 PROCEDURE — 99213 OFFICE O/P EST LOW 20 MIN: CPT | Mod: 25 | Performed by: NURSE PRACTITIONER

## 2017-05-11 NOTE — MR AVS SNAPSHOT
After Visit Summary   5/11/2017    Trevor Alexandre    MRN: [id number]           Patient Information     Date Of Birth          1945        Visit Information        Provider Department      5/11/2017 1:00 PM Loly Wells, NP [facility]        Today's Diagnoses     Dehiscence of surgical wound, subsequent encounter    -  1      Care Instructions    Continue with Monday and Thursday for [facility]      Please call if any questions/concerns and/or problems ([phone])    Follow up as previously scheduled.        [facility] to [facility] pain    Three building blocks of wound healing  1. Protein (if not contraindicated)  2. Vitamin C 500 mg oral twice a day  3.  Zinc 220 mg oral daily           Follow-ups after your visit        Your next 10 appointments already scheduled     May 18, 2017  1:00 PM CDT   [facility] Visit with [facility]   [facility] ([facility] )    [address] [address] [phone]            May 22, 2017  3:00 PM CDT   (Arrive by 2:45 PM)   Return Visit with Salena Damian NP   [facility] ([facility])    [address] [facility] [address]   [phone]            May 25, 2017  1:30 PM CDT   [facility]   [facility] ([facility] )    [address] [address] [phone]            May 30, 2017  1:00 PM CDT   [facility]   [facility] ([facility] )    [address] [address] [phone]            Jun 01, 2017  1:00 PM CDT   [facility] Visit [facility]   [facility] ([facility] )    [address] [address] [phone]              Who to contact     If you have questions or need follow up information about today's clinic visit or your schedule please contact  "[facility] directly at [phone].  Normal or non-critical lab and imaging results will be communicated to you by MyChart, letter or phone within 4 business days after the clinic has received the results. If you do not hear from us within 7 days, please contact the clinic through [employer] or phone. If you have a critical or abnormal lab result, we will notify you by phone as soon as possible.  Submit refill requests through [employer] or call your pharmacy and they will forward the refill request to us. Please allow 3 business days for your refill to be completed.          Additional Information About Your Visit        [employer] Information     [employer] lets you send messages to your doctor, view your test results, renew your prescriptions, schedule appointments and more. To sign up, go to [email or web address] . Click on \"Log in\" on the left side of the screen, which will take you to the Welcome page. Then click on \"Sign up Now\" on the right side of the page.     You will be asked to enter the access code listed below, as well as some personal information. Please follow the directions to create your username and password.     Your access code is: [email or web address]  Expires: 2017  3:18 PM     Your access code will  in 90 days. If you need help or a new code, please call your [place] clinic or [phone].        Care EveryWhere ID     This is your Care EveryWhere ID. This could be used by other organizations to access your [place] medical records  [id number]        Your Vitals Were     Pulse Temperature Height BMI (Body Mass Index)          72 98  F (36.7  C) 5' 10.5\" (1.791 m) 29.85 kg/m2         Blood Pressure from Last 3 Encounters:   05/15/17 132/79   17 (!) 149/91   17 144/88    Weight from Last 3 Encounters:   17 211 lb (95.7 kg)   17 213 lb (96.6 kg)   17 214 lb 1.6 oz (97.1 kg)              Today, you had the following     No orders found for display       "   Today's Medication Changes          These changes are accurate as of: 5/11/17 11:59 PM.  If you have any questions, ask your nurse or doctor.               Stop taking these medicines if you haven't already. Please contact your care team if you have questions.     diazepam 5 MG tablet   Commonly known as:  VALIUM                    Primary Care Provider Office Phone # Fax #    Jeremy Glasgow [phone].       [facility] [address] [facility] [address]        Thank you!     Thank you for choosing [facility]  for your care. Our goal is always to provide you with excellent care. Hearing back from our patients is one way we can continue to improve our services. Please take a few minutes to complete the written survey that you may receive in the mail after your visit with us. Thank you!             Your Updated Medication List - Protect others around you: Learn how to safely use, store and throw away your medicines at www.disposemymeds.org.          This list is accurate as of: 5/11/17 11:59 PM.  Always use your most recent med list.                   Brand Name Dispense Instructions for use    BACLOFEN PO      Take 10 mg by mouth 2 times daily       DICLOFENAC SODIUM PO      Take 50 mg by mouth 2 times daily       GLIPIZIDE PO      Take 7.5 mg by mouth 2 times daily (before meals)       HYDROcodone-acetaminophen 5-325 MG per tablet    NORCO     Take 1 tablet by mouth every 6 hours as needed for moderate to severe pain (for arthritis)       LISINOPRIL PO      Take 5 mg by mouth daily       metFORMIN 500 MG 24 hr tablet    GLUCOPHAGE-XR     Take 2,000 mg by mouth daily       oxyCODONE 5 MG IR tablet    ROXICODONE    30 tablet    Take 1-2 tablets (5-10 mg) by mouth every 6 hours as needed for moderate to severe pain (1 hour before dressing change)

## 2017-05-12 NOTE — PROGRESS NOTES
"SUBJECTIVE:  Trevor Alexandre, 71 year old, male presents with the following Chief Complaint(s) with HPI to follow:  Chief Complaint   Patient presents with     WOUND CARE     NPWT dressing change     Wound Care    HPI:  Trevor is here today for the reassessment and treatment of his back wound.  Back story:  Irving states that he has had a longstanding lump in the area, which was present for years.    At the end of February, he accidentally struck the area on a door.  The lump got bigger and painful.    [facility]   He was diagnosed with infection and started on minocycline.   Shortly after leaving the clinic, the area \"opened up\" and drained a copious amount of fluid.   It kept draining the pain worsened.     [facility] D[facility].   Despite this, the pain and drainage continued.    [facility]ving [facility] and was [facility] to  here.    Irving had the infected sebaceous cyst removed on 3/22/17. He had NPWT (negative pressure wound therapy-wound vac) placed on 3/24/17.   He continues having the NPWT dressing changed here at [facility] every Monday and Thursday.  He has no pain in the area.   Denies any fevers, chills, and/or malodorous drainage.     Irving's continues to have right leg/back pain issues.    Has had this happen in the past.  I believe he had a cortisone shot for it.      Last month, he exacerbated it lifting some gas can.    He has an appointment this week with orthopedic specialist over in [place].      Patient Active Problem List   Diagnosis     Abscess of back     Open wound of back     Dehiscence of surgical wound, subsequent encounter       Past Medical History:   Diagnosis Date     Abscess of back 03/23/2017     Open wound of back 03/23/2017       Past Surgical History:   Procedure Laterality Date     EXAM UNDER ANESTHESIA, CHANGE DRESSING (LOCATION), COMBINED N/A 3/24/2017    Procedure: COMBINED EXAM UNDER ANESTHESIA, " "CHANGE DRESSING (LOCATION);  Surgeon: Tahir De Jesus DO;  Location: [facility]     EXCISE LESION BACK N/A 3/22/2017    Procedure: EXCISE LESION BACK;  Surgeon: Servando Dale MD;  Location: [facility]     IRRIGATION AND DEBRIDEMENT TRUNK, COMBINED N/A 3/23/2017    Procedure: COMBINED IRRIGATION AND DEBRIDEMENT TRUNK;  Surgeon: Servando Dale MD;  Location: [facility]       No family history on file.    Social History   Substance Use Topics     Smoking status: Former Smoker     Types: Cigarettes     Quit date: 3/27/2002     Smokeless tobacco: Not on file     Alcohol use Not on file       Current Outpatient Prescriptions   Medication Sig Dispense Refill     BACLOFEN PO Take 10 mg by mouth 2 times daily       DICLOFENAC SODIUM PO Take 50 mg by mouth 2 times daily       HYDROcodone-acetaminophen (NORCO) 5-325 MG per tablet Take 1 tablet by mouth every 6 hours as needed for moderate to severe pain (for arthritis)       oxyCODONE (ROXICODONE) 5 MG IR tablet Take 1-2 tablets (5-10 mg) by mouth every 6 hours as needed for moderate to severe pain (1 hour before dressing change) 30 tablet 0     metFORMIN (GLUCOPHAGE-XR) 500 MG 24 hr tablet Take 2,000 mg by mouth daily       GLIPIZIDE PO Take 7.5 mg by mouth 2 times daily (before meals)       LISINOPRIL PO Take 5 mg by mouth daily         No Known Allergies    REVIEW OF SYSTEMS  Skin: as noted above  Eyes: negative  Ears/Nose/Throat: negative  Respiratory: No shortness of breath, dyspnea on exertion, cough, or hemoptysis  Cardiovascular: negative  Gastrointestinal: negative  Genitourinary: negative  Musculoskeletal: positive for right leg/back pain; history of sciatica   Neurologic: negative  Psychiatric: negative  Hematologic/Lymphatic/Immunologic: negative  Endocrine: diabetes    OBJECTIVE:  BP (!) 149/91  Pulse 72  Temp 98  F (36.7  C)  Ht 5' 10.5\" (1.791 m)  Wt 211 lb (95.7 kg)  BMI 29.85 kg/m2  Constitutional: healthy, alert and mild distress related to " pain  Cardiovascular: RRR. No murmurs, clicks gallops or rub  Respiratory: Good diaphragmatic excursion. Lungs clear  Skin:   Wound description: Type of Wound- surgical; Location- upper/mid back , Drainage amount-small/moderate, Drainage color-reddish/tan and dark green, Odor- none; Wound bed-100% red and granulating, Surrounding skin-intact, resolving folliculitis .  Measurements: not done today (last measurement: 6.7 x 6.7 on 5/4/17)   Dressing change:   Removed black foam, 1 pieces.    Wound cleansed with soap and water.   Noted 3-4 folliculitis lesions around periwound area.  Applied chlorhexidine to this area   Applied skin barrier wipe and duoderm around periwound edges.      Dressed with 2 pieces of black foam (placed collagen to wound base, a full piece over the wound, and one to his left side as the full piece didn't cover it), pump set at 125 mm/hg continuous. Good seal obtained.      Psychiatric: mentation appears normal and affect normal/bright    LABS  Results for orders placed or performed during the hospital encounter of 03/22/17   CBC with platelets differential   Result Value Ref Range    WBC 14.5 (H) 4.0 - 11.0 10e9/L    RBC Count 4.91 4.4 - 5.9 10e12/L    Hemoglobin 14.4 13.3 - 17.7 g/dL    Hematocrit 42.8 40.0 - 53.0 %    MCV 87 78 - 100 fl    MCH 29.3 26.5 - 33.0 pg    MCHC 33.6 31.5 - 36.5 g/dL    RDW 12.5 10.0 - 15.0 %    Platelet Count 385 150 - 450 10e9/L    Diff Method Automated Method     % Neutrophils 74.6 %    % Lymphocytes 13.2 %    % Monocytes 8.7 %    % Eosinophils 0.8 %    % Basophils 0.5 %    % Immature Granulocytes 2.2 %    Nucleated RBCs 0 0 /100    Absolute Neutrophil 10.8 (H) 1.6 - 8.3 10e9/L    Absolute Lymphocytes 1.9 0.8 - 5.3 10e9/L    Absolute Monocytes 1.3 0.0 - 1.3 10e9/L    Absolute Eosinophils 0.1 0.0 - 0.7 10e9/L    Absolute Basophils 0.1 0.0 - 0.2 10e9/L    Abs Immature Granulocytes 0.3 0 - 0.4 10e9/L    Absolute Nucleated RBC 0.0    Comprehensive metabolic panel    Result Value Ref Range    Sodium 134 133 - 144 mmol/L    Potassium 4.1 3.4 - 5.3 mmol/L    Chloride 99 94 - 109 mmol/L    Carbon Dioxide 26 20 - 32 mmol/L    Anion Gap 9 3 - 14 mmol/L    Glucose 190 (H) 70 - 99 mg/dL    Urea Nitrogen 6 (L) 7 - 30 mg/dL    Creatinine 0.73 0.66 - 1.25 mg/dL    GFR Estimate >90  Non  GFR Calc   >60 mL/min/1.7m2    GFR Estimate If Black >90   GFR Calc   >60 mL/min/1.7m2    Calcium 9.1 8.5 - 10.1 mg/dL    Bilirubin Total 0.4 0.2 - 1.3 mg/dL    Albumin 3.3 (L) 3.4 - 5.0 g/dL    Protein Total 8.4 6.8 - 8.8 g/dL    Alkaline Phosphatase 75 40 - 150 U/L    ALT 31 0 - 70 U/L    AST 12 0 - 45 U/L   CRP inflammation   Result Value Ref Range    CRP Inflammation 165.0 (H) 0.0 - 8.0 mg/L   Erythrocyte sedimentation rate auto   Result Value Ref Range    Sed Rate 44 (H) 0 - 20 mm/h   Glucose by meter   Result Value Ref Range    Glucose 166 (H) 70 - 99 mg/dL   Glucose by meter   Result Value Ref Range    Glucose 250 (H) 70 - 99 mg/dL   Glucose by meter   Result Value Ref Range    Glucose 439 (H) 70 - 99 mg/dL   Glucose by meter   Result Value Ref Range    Glucose 466 (H) 70 - 99 mg/dL   Glucose by meter   Result Value Ref Range    Glucose 441 (H) 70 - 99 mg/dL   Hemoglobin A1c   Result Value Ref Range    Hemoglobin A1C 8.0 (H) 4.3 - 6.0 %   CBC with platelets differential   Result Value Ref Range    WBC 14.2 (H) 4.0 - 11.0 10e9/L    RBC Count 3.28 (L) 4.4 - 5.9 10e12/L    Hemoglobin 9.5 (L) 13.3 - 17.7 g/dL    Hematocrit 28.8 (L) 40.0 - 53.0 %    MCV 88 78 - 100 fl    MCH 29.0 26.5 - 33.0 pg    MCHC 33.0 31.5 - 36.5 g/dL    RDW 12.5 10.0 - 15.0 %    Platelet Count 367 150 - 450 10e9/L    Diff Method Automated Method     % Neutrophils 69.5 %    % Lymphocytes 17.2 %    % Monocytes 9.0 %    % Eosinophils 0.2 %    % Basophils 0.4 %    % Immature Granulocytes 3.7 %    Nucleated RBCs 0 0 /100    Absolute Neutrophil 9.9 (H) 1.6 - 8.3 10e9/L    Absolute Lymphocytes 2.5 0.8 -  5.3 10e9/L    Absolute Monocytes 1.3 0.0 - 1.3 10e9/L    Absolute Eosinophils 0.0 0.0 - 0.7 10e9/L    Absolute Basophils 0.1 0.0 - 0.2 10e9/L    Abs Immature Granulocytes 0.5 (H) 0 - 0.4 10e9/L    Absolute Nucleated RBC 0.0    Basic metabolic panel   Result Value Ref Range    Sodium 139 133 - 144 mmol/L    Potassium 4.3 3.4 - 5.3 mmol/L    Chloride 106 94 - 109 mmol/L    Carbon Dioxide 25 20 - 32 mmol/L    Anion Gap 8 3 - 14 mmol/L    Glucose 217 (H) 70 - 99 mg/dL    Urea Nitrogen 10 7 - 30 mg/dL    Creatinine 0.70 0.66 - 1.25 mg/dL    GFR Estimate >90  Non  GFR Calc   >60 mL/min/1.7m2    GFR Estimate If Black >90   GFR Calc   >60 mL/min/1.7m2    Calcium 8.1 (L) 8.5 - 10.1 mg/dL   Glucose by meter   Result Value Ref Range    Glucose 275 (H) 70 - 99 mg/dL   Glucose by meter   Result Value Ref Range    Glucose 205 (H) 70 - 99 mg/dL   Estimated Average Glucose   Result Value Ref Range    Estimated Average Glucose 183 mg/dL   Glucose by meter   Result Value Ref Range    Glucose 163 (H) 70 - 99 mg/dL   Glucose by meter   Result Value Ref Range    Glucose 186 (H) 70 - 99 mg/dL   Glucose by meter   Result Value Ref Range    Glucose 331 (H) 70 - 99 mg/dL   Glucose by meter   Result Value Ref Range    Glucose 341 (H) 70 - 99 mg/dL   CBC with platelets differential   Result Value Ref Range    WBC 11.5 (H) 4.0 - 11.0 10e9/L    RBC Count 2.91 (L) 4.4 - 5.9 10e12/L    Hemoglobin 8.9 (L) 13.3 - 17.7 g/dL    Hematocrit 25.5 (L) 40.0 - 53.0 %    MCV 88 78 - 100 fl    MCH 30.6 26.5 - 33.0 pg    MCHC 34.9 31.5 - 36.5 g/dL    RDW 12.6 10.0 - 15.0 %    Platelet Count 311 150 - 450 10e9/L    Diff Method Automated Method     % Neutrophils 64.9 %    % Lymphocytes 22.1 %    % Monocytes 7.8 %    % Eosinophils 0.9 %    % Basophils 0.3 %    % Immature Granulocytes 4.0 %    Nucleated RBCs 0 0 /100    Absolute Neutrophil 7.5 1.6 - 8.3 10e9/L    Absolute Lymphocytes 2.5 0.8 - 5.3 10e9/L    Absolute Monocytes 0.9  0.0 - 1.3 10e9/L    Absolute Eosinophils 0.1 0.0 - 0.7 10e9/L    Absolute Basophils 0.0 0.0 - 0.2 10e9/L    Abs Immature Granulocytes 0.5 (H) 0 - 0.4 10e9/L    Absolute Nucleated RBC 0.0    Basic metabolic panel   Result Value Ref Range    Sodium 141 133 - 144 mmol/L    Potassium 4.1 3.4 - 5.3 mmol/L    Chloride 111 (H) 94 - 109 mmol/L    Carbon Dioxide 25 20 - 32 mmol/L    Anion Gap 5 3 - 14 mmol/L    Glucose 220 (H) 70 - 99 mg/dL    Urea Nitrogen 9 7 - 30 mg/dL    Creatinine 0.65 (L) 0.66 - 1.25 mg/dL    GFR Estimate >90  Non  GFR Calc   >60 mL/min/1.7m2    GFR Estimate If Black >90   GFR Calc   >60 mL/min/1.7m2    Calcium 8.1 (L) 8.5 - 10.1 mg/dL   Glucose by meter   Result Value Ref Range    Glucose 197 (H) 70 - 99 mg/dL   Glucose by meter   Result Value Ref Range    Glucose 193 (H) 70 - 99 mg/dL   Glucose by meter   Result Value Ref Range    Glucose 170 (H) 70 - 99 mg/dL   Glucose by meter   Result Value Ref Range    Glucose 195 (H) 70 - 99 mg/dL   Glucose by meter   Result Value Ref Range    Glucose 234 (H) 70 - 99 mg/dL   Wound Ostomy Continence Nurse IP Consult    Narrative    Priya Cheek RN     3/24/2017 10:33 AM  Pt seen in OR during dressing change by Dr De Jesus for wound vac   placement.  [facility] insurance for coverage of the wound vac at home is   pending.  There is an approval from the [facility] for his wound center   visits; documents were faxed to the [facility] for approval of the DME   (equipment and dressing kits)    Pt wound was measured by Dr De Jesus.  The wound bed is red,   moist with scattered areas from cautery.  The hair around the   wound was clipped and prepped with a skin barrier wipe.  Draping   was placed under the bridge foam. One piece of black foam was   placed into the wound in addition to a bridge to the side and a   disc landing.   Draping applied and seal check confirmed along   with wound vac settings of 125, low, continuous.       Wound Culture  Aerobic Bacterial   Result Value Ref Range    Specimen Description Back     Culture Micro No growth after 7 days     Micro Report Status FINAL 03/29/2017    Gram stain   Result Value Ref Range    Specimen Description Back     Gram Stain (A)      Moderate PMNs seen  Moderate Epithelial cells seen  Moderate Gram positive cocci      Micro Report Status FINAL 03/22/2017    Cyst Culture Aerobic Bacterial   Result Value Ref Range    Specimen Description Cyst     Culture Micro       Canceled, Test credited  Incorrectly ordered by [facility]      Micro Report Status FINAL 03/22/2017    Gram stain   Result Value Ref Range    Specimen Description Cyst     Gram Stain       Canceled, Test credited  Incorrectly ordered by [facility]      Micro Report Status FINAL 03/22/2017    Anaerobic bacterial culture   Result Value Ref Range    Specimen Description Cyst     Culture Micro       Canceled, Test credited  Incorrectly ordered by [facility]      Micro Report Status FINAL 03/22/2017    Fluid Culture Aerobic Bacterial   Result Value Ref Range    Specimen Description Cyst     Culture Micro       Canceled, Test credited  Incorrectly ordered by [facility]      Micro Report Status FINAL 03/22/2017    Gram stain   Result Value Ref Range    Specimen Description Cyst     Gram Stain       Canceled, Test credited  Incorrectly ordered by [facility]inic      Micro Report Status FINAL 03/22/2017    Anaerobic bacterial culture   Result Value Ref Range    Specimen Description Tissue Back Abscess     Culture Micro No anaerobes isolated     Micro Report Status FINAL 04/01/2017    Gram stain   Result Value Ref Range    Specimen Description Tissue Back Abscess     Gram Stain (A)      Many WBC'S seen  Moderate Gram positive cocci in clusters  Moderate Gram positive cocci in pairs      Micro Report Status FINAL 03/22/2017    Tissue Culture Aerobic Bacterial   Result Value Ref Range    Specimen Description Back Abscess     Culture Micro (A)      Light  growth Coagulase negative Staphylococcus No further identification or   sensitivity done Possible skin contaminant.  Critical Value called to and read back by Luly Estevez at 0700 on 3/26/2017 by   Velvet Haro      Micro Report Status FINAL 03/27/2017      *Note: Due to a large number of results and/or encounters for the requested time period, some results have not been displayed. A complete set of results can be found in Results Review.       ASSESSMENT / PLAN:  (T81.31XD) Dehiscence of surgical wound, subsequent encounter  (primary encounter diagnosis)  Comment:   Wound base looks great.      Plan:  I did add collagen to wound base.    Follow up as previously scheduled.    Please keep appointment with Orthopedics.        Patient Instructions   Continue with Monday and Thursday for NPWT dressing changes at the [facility].      Please call if any questions/concerns and/or problems ([phone])    Follow up as previously scheduled.        Please follow up with your [facility] provider to address right leg/back pain    Three building blocks of wound healing  1. Protein (if not contraindicated)  2. Vitamin C 500 mg oral twice a day  3.  Zinc 220 mg oral daily       Time: 40 minutes  Barrier: none  Willingness to learn: accepting    Loly Wells APRN FNP-BC  Disease Management

## 2017-05-15 ENCOUNTER — HOSPITAL ENCOUNTER (OUTPATIENT)
Dept: WOUND CARE | Facility: HOSPITAL | Age: 72
Discharge: HOME | End: 2017-05-15
Attending: RADIOLOGY | Admitting: FAMILY MEDICINE
Payer: COMMERCIAL

## 2017-05-15 VITALS — SYSTOLIC BLOOD PRESSURE: 132 MMHG | DIASTOLIC BLOOD PRESSURE: 79 MMHG | HEART RATE: 67 BPM | TEMPERATURE: 98.4 F

## 2017-05-15 PROCEDURE — 97605 NEG PRS WND THER DME<=50SQCM: CPT

## 2017-05-15 NOTE — PROGRESS NOTES
Trevor Alexandre, 1945  No chief complaint on file.      Patient Active Problem List   Diagnosis     Abscess of back     Open wound of back     Dehiscence of surgical wound, subsequent encounter       Concerns/Comments since last visits: No problems with wound vac.  Has a sister that will help with dressings once we can d/c the wound vac.  He is starting PT for his back/hip pain which is getting better. Is now able to walk with a cane    Objective:      05/15/17 1300   Wound 03/27/17 Back   Date First Assessed/Time First Assessed: 03/27/17 0810   Location: Back   Site Assessment Red;Granulation tissue   Raisa-wound Assessment WDL   Size - Length x Width x Depth (cm) 6.2 x 5.8 x 0.4  (depth is estimate)   Drainage Amount Moderate   Drainage Color/Charcteristics Serosanguinous  (some cloudy drainage)   Wound Care/Cleansing Soap and water  (antibacterial)   Dressing Vacuum based   Dressing Status New dressing   Wound Description (WOC) Full thickness   State of Healing (WOC) Early/partial granulation   Wound/Skin Edges (WOC) Attached edges   Negative Pressure Wound Therapy Back Mid   Placement Date/Time: 03/24/17 0758   Inserted by: Dr De Jesus / Priya Galicia RN  Location: Back  Wound Location Orientation: Mid   Wound Type Surgical   Unit Type KCI   Dressing Type Black foam   Number of pieces used 1   Cycle Continuous   Target Pressure (mmHg) 125       Procedures:   Wound vac dressing change done.  Removed 1 black foam.  Cleansed with antibacterial sopa. Raisa wound skin protected with Duoderm CGF and skin barrier.  Inserted 1 black foam  Draped wound.  Confirmed seal check and vac settings (125 mm/hg, low, continuous)  prior to departure.      Assessments  Continues to improve.  Wound base is granular with some depth near the center    Plan of care:   Continue 2x/week wound vac dressing changes    Treatment Goals:  Enhance granulation tissue formation with wound vac    Supplies provided:  n/a    Education:  Wound  care instructions/education provided. Patient verbalized understanding of instruction/education.    Nurse face to face time: 25 min    CC: Jeremy Glasgow/Tahir De Jesus

## 2017-05-16 NOTE — PATIENT INSTRUCTIONS
Continue with Monday and Thursday for NPWT dressing changes at the [facility].      Please call if any questions/concerns and/or problems ([phone])    Follow up as previously scheduled.        Please follow up with your [facility] provider to address right leg/back pain    Three building blocks of wound healing  1. Protein (if not contraindicated)  2. Vitamin C 500 mg oral twice a day  3.  Zinc 220 mg oral daily

## 2017-05-18 ENCOUNTER — HOSPITAL ENCOUNTER (OUTPATIENT)
Dept: WOUND CARE | Facility: HOSPITAL | Age: 72
Discharge: HOME | End: 2017-05-18
Attending: FAMILY MEDICINE | Admitting: FAMILY MEDICINE
Payer: COMMERCIAL

## 2017-05-18 VITALS — TEMPERATURE: 98.5 F | HEART RATE: 80 BPM | DIASTOLIC BLOOD PRESSURE: 88 MMHG | SYSTOLIC BLOOD PRESSURE: 163 MMHG

## 2017-05-18 PROCEDURE — 97605 NEG PRS WND THER DME<=50SQCM: CPT

## 2017-05-19 NOTE — PROGRESS NOTES
Trevor Alexandre, 1945  Chief Complaint   Patient presents with     WOUND CARE       Patient Active Problem List   Diagnosis     Abscess of back     Open wound of back     Dehiscence of surgical wound, subsequent encounter       Concerns/Comments:   Trevor is here for reevaluation and tx of back abscess secondary to cyst infection. Per Trevor he has had a long standing quarter sized cyst to his back that in February became infected. This was debrided 3/22/2017 and a wound vac was placed on 3/24. Denies pain to back wound but is currently having tremendous hip pain that was recently dx as bursitis. The prescibed PT, baclofen and diclofenac have not helped with this pain so [facility]. No issues with wound vac dressing.    Objective:      05/19/17 0900   Wound 03/27/17 Back   Date First Assessed/Time First Assessed: 03/27/17 0810   Location: Back   Site Assessment Red;Granulation tissue  (Hypergranular but to 9:00 edge)   Raisa-wound Assessment WDL   Drainage Amount Moderate   Drainage Color/Charcteristics Serosanguinous;Tan   Wound Care/Cleansing Soap and water  (Cloraprep to periwound skin)   Dressing Vacuum based   Dressing Status New dressing   Wound Description (WOC) Full thickness   State of Healing (WOC) Early/partial granulation   Wound/Skin Edges (WOC) Attached edges   Negative Pressure Wound Therapy Back Mid   Placement Date/Time: 03/24/17 0758   Inserted by: Dr De Jesus / Priya Galicia RN  Location: Back  Wound Location Orientation: Mid   Wound Type Surgical   Unit Type KCI   Dressing Type Black foam   Number of pieces used 1   Cycle Continuous   Target Pressure (mmHg) 125   Intensity (Low)   Cannister changed? Yes     Procedures:   Wound bed and periwound skin cleansed and evaluated. Silver nitrate applied to hypergranular bud to resolve. Hydrocolloid CGF applied surrounding wound. NPWT dressing placed as described above.    Assessment/Response to  tx:  New epithelial migration.    Plan of care:   Continue NPWT as wound is improving.    Treatment Goal:  Continued granulation and epithelialization.  Resolve pustule formation to periwound skin.    Supplies provided:  NA    Education:  Wound care instructions/education provided. Patient verbalized understanding of instruction/education.    Nurse face to face time: 35min    CC: Jeremy Glasgow

## 2017-05-22 ENCOUNTER — HOSPITAL ENCOUNTER (OUTPATIENT)
Dept: WOUND CARE | Facility: HOSPITAL | Age: 72
Discharge: HOME | End: 2017-05-22
Attending: NURSE PRACTITIONER | Admitting: FAMILY MEDICINE
Payer: COMMERCIAL

## 2017-05-22 VITALS — HEART RATE: 75 BPM | DIASTOLIC BLOOD PRESSURE: 86 MMHG | SYSTOLIC BLOOD PRESSURE: 130 MMHG | TEMPERATURE: 98.3 F

## 2017-05-22 PROCEDURE — 97605 NEG PRS WND THER DME<=50SQCM: CPT

## 2017-05-22 NOTE — PROGRESS NOTES
Trevor Alexandre, 1945  Chief Complaint   Patient presents with     WOUND CARE       Patient Active Problem List   Diagnosis     Abscess of back     Open wound of back     Dehiscence of surgical wound, subsequent encounter       Concerns/Comments:   Trevor is here for reevaluation and tx of back abscess secondary to cyst infection. Per Trevor he has had a long standing quarter sized cyst to his back that in February became infected. This was debrided 3/22/2017 and a wound vac was placed on 3/24. Denies pain to back wound. Has had recent issues with bursitis pain in his hips that just in the past few days has started to subside; he is unsure if it is because of the PT therapy or if it is resolving on its own. No issues with wound vac dressing.    Objective:      05/22/17 1500   Wound 03/27/17 Back   Date First Assessed/Time First Assessed: 03/27/17 0810   Location: Back   Site Assessment Red;Granulation tissue   Raisa-wound Assessment WDL   Size - Length x Width x Depth (cm) 5.9x5.6x0.3   Drainage Amount Moderate   Drainage Color/Charcteristics Serosanguinous;Tan;Green   Wound Care/Cleansing Soap and water   Dressing Vacuum based   Dressing Status New dressing   Wound Description (WOC) Full thickness   State of Healing (WOC) Early/partial granulation   Wound/Skin Edges (WOC) Attached edges   Negative Pressure Wound Therapy Back Mid   Placement Date/Time: 03/24/17 0758   Inserted by: Dr De Jesus / Priya Galicia RN  Location: Back  Wound Location Orientation: Mid   Wound Type Surgical   Unit Type KCI   Dressing Type Black foam   Number of pieces used 1   Cycle Continuous   Target Pressure (mmHg) 125   Intensity (Low)   Cannister changed? Yes     Procedures:   Wound bed and periwound skin cleansed and evaluated. Prepped periwound skin with cloraprep due to folliculitis issues and draped around wound with hydrocolloid CGF. NPWT dressing placed, adequate seal obtained.    Assessment/Response to tx:  Continues to  granulate with new epithelial migration.    Plan of care:   Continue NPWT as wound is responding well to tx.    Treatment Goal:  Continued granulation and epithelialization.    Supplies provided:  NA    Education:  Wound care instructions/education provided. Patient verbalized understanding of instruction/education.    Nurse face to face time: 30min    CC: Jeremy Glasgow/Dr. Dale

## 2017-05-25 ENCOUNTER — HOSPITAL ENCOUNTER (OUTPATIENT)
Dept: WOUND CARE | Facility: HOSPITAL | Age: 72
Discharge: HOME | End: 2017-05-25
Attending: SURGERY | Admitting: FAMILY MEDICINE
Payer: COMMERCIAL

## 2017-05-25 VITALS — TEMPERATURE: 98.8 F | SYSTOLIC BLOOD PRESSURE: 125 MMHG | HEART RATE: 88 BPM | DIASTOLIC BLOOD PRESSURE: 80 MMHG

## 2017-05-25 PROCEDURE — 97605 NEG PRS WND THER DME<=50SQCM: CPT

## 2017-05-25 NOTE — PROGRESS NOTES
"Trevor Alexandre, 1945  Chief Complaint   Patient presents with     WOUND CARE     open wound of back; wound vac dressing change       Patient Active Problem List   Diagnosis     Abscess of back     Open wound of back     Dehiscence of surgical wound, subsequent encounter       Concerns/Comments since last visits: continues to have arthritic pain in knees and hips.  Has a sister that can help with dressing changes.  Agreed to evaluate for possible \"vac vacation\" next visit to see if he can be transitioned from the wound vac    Objective:      05/25/17 1500   Wound 03/27/17 Back   Date First Assessed/Time First Assessed: 03/27/17 0810   Location: Back   Site Assessment Red;Granulation tissue   Drainage Amount Moderate   Drainage Color/Charcteristics Serosanguinous;Tan;Green   Wound Care/Cleansing Soap and water  (Hibiclens)   Dressing Vacuum based   Dressing Status New dressing   Wound Description (WOC) Full thickness   State of Healing (WOC) Fully granulated   Wound/Skin Edges (WOC) Attached edges   Negative Pressure Wound Therapy Back Mid   Placement Date/Time: 03/24/17 0758   Inserted by: Dr De Jesus / Priya Galciia RN  Location: Back  Wound Location Orientation: Mid   Wound Type Surgical   Unit Type KCI   Dressing Type Black foam   Number of pieces used 1   Cycle Continuous   Target Pressure (mmHg) 125   Cannister changed? Yes     Very faint odor of stagnant drainage when draping removed.    Procedures:   Wound vac dressing change done.  Removed 1 black foam.  Cleansed with hibiclens due to some scattered folliculitis with pustules . Raisa wound skin protected with thin hydrocolloid.  Inserted 1 black foam.  Draped wound.  Confirmed seal check and vac settings (125 mm/hg, low, continuous)  prior to departure.    Assessments  Continued improvement; fully granulated    Plan of care:   Next vac change on 5/30 - will evaluate for vac vacation at this time    Treatment Goals:  Granulation tissue formation with use " of wound vac    Supplies provided:  n/a    Education:  Wound care instructions/education provided. Patient verbalized understanding of instruction/education.    Nurse face to face time: 35 min    CC: Jeremy Glasgow

## 2017-05-30 ENCOUNTER — HOSPITAL ENCOUNTER (OUTPATIENT)
Dept: WOUND CARE | Facility: HOSPITAL | Age: 72
Discharge: HOME | End: 2017-05-30
Attending: SURGERY | Admitting: FAMILY MEDICINE
Payer: COMMERCIAL

## 2017-05-30 VITALS — DIASTOLIC BLOOD PRESSURE: 84 MMHG | TEMPERATURE: 97.9 F | HEART RATE: 83 BPM | SYSTOLIC BLOOD PRESSURE: 128 MMHG

## 2017-05-30 PROCEDURE — 99213 OFFICE O/P EST LOW 20 MIN: CPT

## 2017-05-30 NOTE — DISCHARGE INSTRUCTIONS
Change dressing every other day.  Cleanse wound and surrounding skin with baby wash.  Apply barrier wipe to surrounding skin where adhesive will stick.  Place Aquacel Ag fiber dressing to base of wound.  Cover with circular foam dressing.  Wipe around edges of circular foam dressing with the barrier wipe.

## 2017-05-30 NOTE — IP AVS SNAPSHOT
MRN:[id number]                      After Visit Summary   5/30/2017    Trevor Alexandre    MRN: [id number]           Visit Information        Provider Department      5/30/2017  1:00 PM [facility]           Review of your medicines      UNREVIEWED medicines. Ask your doctor about these medicines        Dose / Directions    BACLOFEN PO        Dose:  10 mg   Take 10 mg by mouth 2 times daily   Refills:  0       DICLOFENAC SODIUM PO        Dose:  50 mg   Take 50 mg by mouth 2 times daily   Refills:  0       GLIPIZIDE PO        Dose:  7.5 mg   Take 7.5 mg by mouth 2 times daily (before meals)   Refills:  0       HYDROcodone-acetaminophen 5-325 MG per tablet   Commonly known as:  NORCO        Dose:  1 tablet   Take 1 tablet by mouth every 6 hours as needed for moderate to severe pain (for arthritis)   Refills:  0       LISINOPRIL PO        Dose:  5 mg   Take 5 mg by mouth daily   Refills:  0       metFORMIN 500 MG 24 hr tablet   Commonly known as:  GLUCOPHAGE-XR        Dose:  2000 mg   Take 2,000 mg by mouth daily   Refills:  0       oxyCODONE 5 MG IR tablet   Commonly known as:  ROXICODONE   Used for:  Abscess of back        Dose:  5-10 mg   Take 1-2 tablets (5-10 mg) by mouth every 6 hours as needed for moderate to severe pain (1 hour before dressing change)   Quantity:  30 tablet   Refills:  0                Protect others around you: Learn how to safely use, store and throw away your medicines at www.disposemymeds.org.         Follow-ups after your visit        Your next 10 appointments already scheduled     Jun 01, 2017  1:00 PM CDT   Return Visit with [facility]   [facility] ([facility] )    [address] [place] [id number] [phone]               Care Instructions        Further instructions from your care team       Change dressing every other day.  Cleanse wound and surrounding skin with baby wash.  Apply barrier wipe to surrounding skin  "where adhesive will stick.  Place Aquacel Ag fiber dressing to base of wound.  Cover with circular foam dressing.  Wipe around edges of circular foam dressing with the barrier wipe.     Additional Information About Your Visit        [employer] Information     [employer] lets you send messages to your doctor, view your test results, renew your prescriptions, schedule appointments and more. To sign up, go to [email or web address] . Click on \"Log in\" on the left side of the screen, which will take you to the Welcome page. Then click on \"Sign up Now\" on the right side of the page.     You will be asked to enter the access code listed below, as well as some personal information. Please follow the directions to create your username and password.     Your access code is: [email or web address]  Expires: 2017  3:18 PM     Your access code will  in 90 days. If you need help or a new code, please call your [facility] clinic or [phone].        Care EveryWhere ID     This is your Care EveryWhere ID. This could be used by other organizations to access your [facility] medical records  [id number]        Your Vitals Were     Blood Pressure Pulse Temperature             128/84 83 97.9  F (36.6  C) (Tympanic)          Primary Care Provider Office Phone # Fax #    Jeremy Glasgow [phone].      Thank you!     Thank you for choosing [facility] for your care. Our goal is always to provide you with excellent care. Hearing back from our patients is one way we can continue to improve our services. Please take a few minutes to complete the written survey that you may receive in the mail after you visit with us. Thank you!             Medication List: This is a list of all your medications and when to take them. Check marks below indicate your daily home schedule. Keep this list as a reference.      Medications           Morning Afternoon Evening Bedtime As Needed    BACLOFEN PO   Take 10 mg by mouth 2 times daily          "                       DICLOFENAC SODIUM PO   Take 50 mg by mouth 2 times daily                                GLIPIZIDE PO   Take 7.5 mg by mouth 2 times daily (before meals)                                HYDROcodone-acetaminophen 5-325 MG per tablet   Commonly known as:  NORCO   Take 1 tablet by mouth every 6 hours as needed for moderate to severe pain (for arthritis)                                LISINOPRIL PO   Take 5 mg by mouth daily                                metFORMIN 500 MG 24 hr tablet   Commonly known as:  GLUCOPHAGE-XR   Take 2,000 mg by mouth daily                                oxyCODONE 5 MG IR tablet   Commonly known as:  ROXICODONE   Take 1-2 tablets (5-10 mg) by mouth every 6 hours as needed for moderate to severe pain (1 hour before dressing change)

## 2017-05-30 NOTE — PROGRESS NOTES
Trevor Alexandre, 1945  Chief Complaint   Patient presents with     WOUND CARE       Patient Active Problem List   Diagnosis     Abscess of back     Open wound of back     Dehiscence of surgical wound, subsequent encounter       Concerns/Comments:   Trevor is here for reevaluation and tx of back abscess secondary to cyst infection. Per Trevor he has had a long standing quarter sized cyst to his back that in February became infected. This was debrided 3/22/2017 and a wound vac was placed on 3/24. Denies pain to back wound. Has had recent issues with bursitis pain in his hips that has resolved, continues to have arthritic knee pain. No issues with wound vac dressing.    Objective:      05/30/17 1300   Wound 03/27/17 Back   Date First Assessed/Time First Assessed: 03/27/17 0810   Location: Back   Site Assessment Red;Moist;Granulation tissue   Raisa-wound Assessment WDL   Size - Length x Width x Depth (cm) 5.7x5.1   Drainage Amount Moderate   Drainage Color/Charcteristics Serosanguinous;Green   Wound Care/Cleansing Soap and water;Wound cleanser   Dressing Silver;Hydrofiber;Foam   Dressing Status New dressing   Wound Description (WOC) Full thickness   State of Healing (WOC) Fully granulated   Wound/Skin Edges (WOC) Attached edges     Procedures:   Wound bed and periwound skin cleansed and evaluated. Dressed as described above.    Assessment/Response to tx:  Wound fully granulated with new epithelial migration.  No periwound erythema or excessive warmth; no odor.    Plan of care:   Hold vac due to continued issues with periwound folliculitis and per pts request for break from unit.  Change to dressing as follows:  Change dressing every other day.  Cleanse wound and surrounding skin with baby wash.  Apply barrier wipe to surrounding skin where adhesive will stick.  Place Aquacel Ag fiber dressing to base of wound.  Cover with circular foam dressing.  Wipe around edges of circular foam dressing with the barrier  wipe.    [facility] informed me that they are unable to bill his [facility].    [facility]will send her the following information for dressing supply needs:  3M circular Tegaderm foam #[id number] (disp 20, refill x 5)  Aquacel Ag Extra hydrofiber 4x5 #[id number] (disp 10, refill x 5)  Sureprep no sting barrier wipe #[id number] (disp 1 box of 50, no refills)    Treatment Goal:  Continued epithelial migration.    Supplies provided:  3M circular Tegaderm foam #[id number] (5 provided)  Aquacel Ag Extra hydrofiber 4x5 #[id number] (1 provided)  Sureprep no sting barrier wipe #[id number] (3 wipes provided)    Education:  Wound care instructions/education provided. Patient verbalized understanding of instruction/education.    Nurse face to face time: 35min    CC: Jeremy Glasgow/Dr. De Jesus

## 2017-05-30 NOTE — IP AVS SNAPSHOT
[facility]    [address] [address]    Phone:  [phone]    Fax:  [phone]                                       After Visit Summary   5/30/2017    Trevor Alexandre    MRN: [id number]           After Visit Summary Signature Page     I have received my discharge instructions, and my questions have been answered. I have discussed any challenges I see with this plan with the nurse or doctor.    ..........................................................................................................................................  Patient/Patient Representative Signature      ..........................................................................................................................................  Patient Representative Print Name and Relationship to Patient    ..................................................               ................................................  Date                                            Time    ..........................................................................................................................................  Reviewed by Signature/Title    ...................................................              ..............................................  Date                                                            Time

## 2017-06-07 ENCOUNTER — HOSPITAL ENCOUNTER (OUTPATIENT)
Dept: WOUND CARE | Facility: HOSPITAL | Age: 72
Discharge: HOME | End: 2017-06-07
Attending: FAMILY MEDICINE | Admitting: FAMILY MEDICINE
Payer: COMMERCIAL

## 2017-06-07 VITALS — DIASTOLIC BLOOD PRESSURE: 91 MMHG | SYSTOLIC BLOOD PRESSURE: 151 MMHG | HEART RATE: 79 BPM | TEMPERATURE: 99 F

## 2017-06-07 DIAGNOSIS — S21.209D OPEN WOUND OF BACK, UNSPECIFIED LATERALITY, SUBSEQUENT ENCOUNTER: Primary | ICD-10-CM

## 2017-06-07 PROCEDURE — 87070 CULTURE OTHR SPECIMN AEROBIC: CPT | Performed by: NURSE PRACTITIONER

## 2017-06-07 PROCEDURE — 87186 SC STD MICRODIL/AGAR DIL: CPT | Performed by: NURSE PRACTITIONER

## 2017-06-07 PROCEDURE — 87077 CULTURE AEROBIC IDENTIFY: CPT | Performed by: NURSE PRACTITIONER

## 2017-06-07 PROCEDURE — 99213 OFFICE O/P EST LOW 20 MIN: CPT

## 2017-06-07 NOTE — DISCHARGE INSTRUCTIONS
Wound care instructions    Frequency daily    Remove dressing and cleanse surrounding skin with gentle soap  Dampen half of a 4 x 4 gauze with Dakin's solution and wring out.  Unfold and loosely pack into wound base  Cover with four 4 x 4's and secure with tape.    Use Hibiclens soap in shower except mucous membranes and genital area    Monitor for signs of infection   - pus, odor, redness, heat, fever, chills - if present please see a provider.

## 2017-06-07 NOTE — IP AVS SNAPSHOT
MRN:[id number]                      After Visit Summary   6/7/2017    Trevor Alexandre    MRN: [id number]           Visit Information        Provider Department      6/7/2017  1:00 PM [facility]           Review of your medicines      UNREVIEWED medicines. Ask your doctor about these medicines        Dose / Directions    BACLOFEN PO        Dose:  10 mg   Take 10 mg by mouth 2 times daily   Refills:  0       DICLOFENAC SODIUM PO        Dose:  50 mg   Take 50 mg by mouth 2 times daily   Refills:  0       GLIPIZIDE PO        Dose:  7.5 mg   Take 7.5 mg by mouth 2 times daily (before meals)   Refills:  0       HYDROcodone-acetaminophen 5-325 MG per tablet   Commonly known as:  NORCO        Dose:  1 tablet   Take 1 tablet by mouth every 6 hours as needed for moderate to severe pain (for arthritis)   Refills:  0       LISINOPRIL PO        Dose:  5 mg   Take 5 mg by mouth daily   Refills:  0       metFORMIN 500 MG 24 hr tablet   Commonly known as:  GLUCOPHAGE-XR        Dose:  2000 mg   Take 2,000 mg by mouth daily   Refills:  0         START taking        Dose / Directions    Dakins 0.25 % Soln   Used for:  Open wound of back, unspecified laterality, subsequent encounter        Use as directed, daily damp to dry dressing   Quantity:  1 Bottle   Refills:  1       order for DME   Used for:  Open wound of back, unspecified laterality, subsequent encounter        Sleeve of non-sterile 4 x 4 gauze   Quantity:  1 Package   Refills:  3            Where to get your medicines      Some of these will need a paper prescription and others can be bought over the counter. Ask your nurse if you have questions.     Bring a paper prescription for each of these medications     Dakins 0.25 % Soln    order for DME               Prescriptions were sent or printed at these locations (2 Prescriptions)                   [facility] [place], [place]   [address] [address]     "Telephone:  [phone]   Fax:  [phone]   Hours:                  Printed at Department/Unit printer (2 of 2)         Dakins 0.25 % SOLN               order for DME                 Protect others around you: Learn how to safely use, store and throw away your medicines at www.disposemymeds.org.         Follow-ups after your visit         Care Instructions        Further instructions from your care team       Wound care instructions    Frequency daily    Remove dressing and cleanse surrounding skin with gentle soap  Dampen half of a 4 x 4 gauze with Dakin's solution and wring out.  Unfold and loosely pack into wound base  Cover with four 4 x 4's and secure with tape.    Use Hibiclens soap in shower except mucous membranes and genital area    Monitor for signs of infection   - pus, odor, redness, heat, fever, chills - if present please see a provider.           Additional Information About Your Visit        MyChart Information     [employer] lets you send messages to your doctor, view your test results, renew your prescriptions, schedule appointments and more. To sign up, go to [email or web address] . Click on \"Log in\" on the left side of the screen, which will take you to the Welcome page. Then click on \"Sign up Now\" on the right side of the page.     You will be asked to enter the access code listed below, as well as some personal information. Please follow the directions to create your username and password.     Your access code is: [email or web address]  Expires: 2017  3:18 PM     Your access code will  in 90 days. If you need help or a new code, please call your [facility] clinic or [phone].        Care EveryWhere ID     This is your Care EveryWhere ID. This could be used by other organizations to access your [facility] medical records  [id number]        Your Vitals Were     Blood Pressure Pulse Temperature             151/91 79 99  F (37.2  C) (Tympanic)          Primary Care Provider Office Phone # " Fax #    Jeremy Glasgow [phone].      Thank you!     Thank you for choosing [facility] for your care. Our goal is always to provide you with excellent care. Hearing back from our patients is one way we can continue to improve our services. Please take a few minutes to complete the written survey that you may receive in the mail after you visit with us. Thank you!             Medication List: This is a list of all your medications and when to take them. Check marks below indicate your daily home schedule. Keep this list as a reference.      Medications           Morning Afternoon Evening Bedtime As Needed    BACLOFEN PO   Take 10 mg by mouth 2 times daily                                Dakins 0.25 % Soln   Use as directed, daily damp to dry dressing                                DICLOFENAC SODIUM PO   Take 50 mg by mouth 2 times daily                                GLIPIZIDE PO   Take 7.5 mg by mouth 2 times daily (before meals)                                HYDROcodone-acetaminophen 5-325 MG per tablet   Commonly known as:  NORCO   Take 1 tablet by mouth every 6 hours as needed for moderate to severe pain (for arthritis)                                LISINOPRIL PO   Take 5 mg by mouth daily                                metFORMIN 500 MG 24 hr tablet   Commonly known as:  GLUCOPHAGE-XR   Take 2,000 mg by mouth daily                                order for DME   Sleeve of non-sterile 4 x 4 gauze

## 2017-06-07 NOTE — PROGRESS NOTES
Trevor Alexandre, 1945  Chief Complaint   Patient presents with     WOUND CARE       Patient Active Problem List   Diagnosis     Abscess of back     Open wound of back     Dehiscence of surgical wound, subsequent encounter       Concerns/Comments since last visits: patient has been changing dressing daily (dtr assists) for a couple of days due to pustules in the surrounding skin.  He picked up supplies at [facility] in [place] but they only had plain hydrofiber.  [place] but he has tried to keep his dressing dry.  No reported fever and chills.  Discussed appearance of drainage with TOMAS Damian CNP and CORY for culutre and supplies    Objective:      06/07/17 1400   Wound 03/27/17 Back   Date First Assessed/Time First Assessed: 03/27/17 0810   Location: Back   Site Assessment Red;Moist;Granulation tissue  (early signs of hypergranulation)   Raisa-wound Assessment Pustules   Size - Length x Width x Depth (cm) 5.1 x 4.3   Drainage Amount Moderate   Drainage Color/Charcteristics Creamy;Green  (gray)   Wound Care/Cleansing Soap and water;Normal saline  (Chloroprep)   Dressing (Dakin's Damp to dry)   Dressing Status New dressing   Wound Description (WOC) Full thickness   State of Healing (WOC) (Slilght hypergranulation)   Wound/Skin Edges (WOC) Attached edges       Procedures:   Cleansed with baby soap and water and irrigated with saline prior to wound culture.    Dressing per plan of care    Assessments  Purulent appearing drainage but no odor  Size of wound decreased but signs of early hypergranulation    Plan of care:   Chloroprep to surrounding skin  Dakin's damp to dry dressing   [place] in lake  Return early next week    Treatment Goals:  Decrease bioburden for optimal healing    Supplies provided:  Rx to [facility] for Dakin's and supplies    Education:  Wound care instructions/education provided via demonstration, verbal and AVS. Patient verbalized understanding of  instruction/education.    Nurse face to face time: 35 min    CC: Jeremy Glasgow

## 2017-06-09 ENCOUNTER — TELEPHONE (OUTPATIENT)
Dept: WOUND CARE | Facility: HOSPITAL | Age: 72
End: 2017-06-09

## 2017-06-09 LAB
BACTERIA SPEC CULT: ABNORMAL
Lab: ABNORMAL
MICRO REPORT STATUS: ABNORMAL
MICROORGANISM SPEC CULT: ABNORMAL
SPECIMEN SOURCE: ABNORMAL

## 2017-06-09 NOTE — TELEPHONE ENCOUNTER
After a few attempts at trying to connect with pt over the phone today was able to speak to Trevor about his culture results. Explained that he did grow out a light growth of MRSA and gave a a brief amount of education on the organism. Denies any s/s of infection and states the drainage is no longer green and milky since the day after dakins was started. Denies erythema, increase drainage, warmth to surrounding skin or other concerns. He is in agreement that due to health hx and prior use of abx that as long as the wound is improving and he is asymptomatic he would like to use abx only if absolutely needed. Instructed to seek medical attention for signs and symptoms of infection (fever, chills, increased redness, malodor, induration, increased pain, increased drainage); pt verbalized understanding.

## 2017-06-13 ENCOUNTER — HOSPITAL ENCOUNTER (OUTPATIENT)
Dept: WOUND CARE | Facility: HOSPITAL | Age: 72
Discharge: HOME | End: 2017-06-13
Attending: FAMILY MEDICINE | Admitting: FAMILY MEDICINE
Payer: COMMERCIAL

## 2017-06-13 VITALS — DIASTOLIC BLOOD PRESSURE: 84 MMHG | TEMPERATURE: 98.5 F | SYSTOLIC BLOOD PRESSURE: 141 MMHG | HEART RATE: 77 BPM

## 2017-06-13 NOTE — PROGRESS NOTES
Trevor Alexandre, 1945  Chief Complaint   Patient presents with     WOUND CARE       Patient Active Problem List   Diagnosis     Abscess of back     Open wound of back     Dehiscence of surgical wound, subsequent encounter       Concerns/Comments:   Trevor is here for reevaluation and tx of back abscess secondary to cyst infection. Per Trevor he has had a long standing quarter sized cyst to his back that in February 2017 became infected. This was debrided 3/22/2017 and a wound vac therapy was initiated until the wound fully granulated. Denies pain to back wound. Has had recent issues with bursitis pain in his hips that has resolved, continues to have arthritic knee pain. Wound culture that was obtained 5/25/2017 grew out light growth of MRSA; pt would like to hold off on abx tx unless necessary.  Objective:      06/13/17 1300   Wound 03/27/17 Back   Date First Assessed/Time First Assessed: 03/27/17 0810   Location: Back   Site Assessment Red;Moist;Granulation tissue   Raisa-wound Assessment (One pustule; adhesive stripping/irritation)   Size - Length x Width x Depth (cm) 4.5x3.4   Drainage Amount Moderate   Drainage Color/Charcteristics Serosanguinous;Creamy  (Green tinge)   Wound Care/Cleansing Soap and water;Wound cleanser   Dressing (Dakins moistened gauze;dry gauze;Medipore)   Dressing Status New dressing   Wound Description (WOC) Full thickness   State of Healing (WOC) Fully granulated   Wound/Skin Edges (WOC) Attached edges     Procedures:   Wound bed and periwound skin cleansed and evaluated. Wound cleanser applied. Barrier wipe applied to periwound skin. Dressing applied as described above; tacked it in place to reduce amt of tape on skin.    Assessment/Response to tx:  Wound continues to epithelialize. Drainage remains milky but free of odor. No erythema or excessive warmth; does have periwound skin irritation from adhesive.    Plan of care:   Continue BID Dakins dressing changes.  Monitor need for abx tx  due to recent culture of light growth of MRSA; will hold off unless necessary.  Reduce amt of adhesive used on skin.  Use a barrier wipe to skin daily.  Return in one week.    Treatment Goal:  Continues epithelial migration. Topically manage MRSA infection.    Supplies provided:  Medline equivalent of Medipore to trial to see if it is less aggressive on his skin.  Barrier wipes.    Education:  Wound care instructions/education provided. Educated on MRSA bacteria and personal hygiene practices along with rationale for contact precautions. Also give MRSA educational booklet. Patient verbalized understanding of instruction/education.    Nurse face to face time: 30min    CC: Jeremy Glasgow

## 2017-06-20 ENCOUNTER — HOSPITAL ENCOUNTER (OUTPATIENT)
Dept: WOUND CARE | Facility: HOSPITAL | Age: 72
Discharge: HOME | End: 2017-06-20
Attending: FAMILY MEDICINE | Admitting: FAMILY MEDICINE
Payer: COMMERCIAL

## 2017-06-20 VITALS — TEMPERATURE: 98.5 F | SYSTOLIC BLOOD PRESSURE: 151 MMHG | HEART RATE: 73 BPM | DIASTOLIC BLOOD PRESSURE: 91 MMHG

## 2017-06-20 PROCEDURE — 99213 OFFICE O/P EST LOW 20 MIN: CPT

## 2017-06-20 NOTE — DISCHARGE INSTRUCTIONS
Daily dressing change:  Cleanse as before.  Do not need to use barrier wipe.  Apply small amount of Vaseline to immediate skin surrounding wound were crusting develops.  Dress with dakins moist gauze as before, try to put minimal adhesive on skin to affix dressing (try new tapes provided).  After dressing is taped down apply small amount of cream to any irritated skin surrounding.  If and ABD is needed for weeping skin surrounding wound make sure you do not apply the lotion past the size of the ABD or the tape will not stick.

## 2017-06-20 NOTE — PROGRESS NOTES
Trevor Alexandre, 1945  Chief Complaint   Patient presents with     WOUND CARE       Patient Active Problem List   Diagnosis     Abscess of back     Open wound of back     Dehiscence of surgical wound, subsequent encounter       Concerns/Comments:   Trevor is here for reevaluation and tx of back abscess secondary to cyst infection. Per Trevor he has had a long standing quarter sized cyst to his back that in February 2017 became infected. This was debrided 3/22/2017 and a wound vac therapy was initiated until the wound fully granulated. Denies pain to back wound. Has had recent issues with bursitis pain in his hips that has resolved, continues to have arthritic knee pain. Wound culture that was obtained 5/25/2017 grew out light growth of MRSA; pt would like to hold off on abx tx unless necessary.  C/O pruritis to periwound skin distal to wound; this is an area adhesive stripping that is also weeping.  Pt states that the Medfix tape provided at last visit adhered better that the Medipore tape.    Objective:      06/20/17 1300   Wound 03/27/17 Back   Date First Assessed/Time First Assessed: 03/27/17 0810   Location: Back   Site Assessment Red;Moist;Granulation tissue  (Areas of hypergranulation)   Raisa-wound Assessment (Crusting to immediate periwound skin; Adhesive stripping)   Size - Length x Width x Depth (cm) 3.7x2.8   Drainage Amount Small   Drainage Color/Charcteristics Serosanguinous;Tan   Wound Care/Cleansing Soap and water   Dressing (Dakins moist gauze;dry gauze;Medfix adhesive tape)   Dressing Status New dressing   Wound Description (WOC) Full thickness   State of Healing (WOC) Fully granulated   Wound/Skin Edges (WOC) Attached edges     Procedures:   Wound bed/periwound skin cleansed and evaluated. Vaseline applied to immediate periwound skin to assist in softening crusting; was able to remove with gentle cleansing. Dressed wound as described above, tried to apply tape minimally and to less irritated  areas of skin. Applied antifungal cream to remaining periwound skin. Place 5x9 ABD over due to weeping periwound skin; tacked in place adhesive tape.    Assessment/Response to tx:  Wound continues to epithelialize.  Has substantial dry flaky skin irritation from adhesive, however areas that most recently had adhesive present are weeping and have a potential fungal component (pt c/o pruritis, macular weeping rash).  Develops crusting to skin immediately surrounding wound; lifts easily when softened with Vaseline.    Plan of care:   Concern at this time is the periwound irritation due to adhesive and potential fungal component.  Shamar dacosta would not be appropriate at this time as he is weeping in the areas that the adhesive would set.  The skin is chapped and dry with early fissuring in areas and other areas are weeping with macular papular rash. Due to this I decided to d/c the barrier wipes as these may be contributing to the dry skin and also occluding the periwound skin potentially leading to yeast rash.   Start application of a small amount of fungal cream to periwound skin where adhesive does not rest to moisturize dry fissuring skin while providing antifungal component; I understand this may lead to the adhesive not sticking well however the skin needs to be address and we can extend out the taping border for the wound if necessary.  Vaseline to immediate periwound skin to prevent crusting.  Minimize adhesive on skin to what is absolutely necessary.  May use ABD only if necessary if periwound skin is weeping. Explained to pt that they can occlude and lead to further skin irritation and pustule formation.  Return in one week.    Instructions provided as follows-    Daily dressing change:  Cleanse as before.  Do not need to use barrier wipe.  Apply small amount of Vaseline to immediate skin surrounding wound were crusting develops.  Dress with dakins moist gauze as before, try to put minimal adhesive on  skin to affix dressing (try new tapes provided).  After dressing is taped down apply small amount of cream to any irritated skin surrounding.  If and ABD is needed for weeping skin surrounding wound make sure you do not apply the lotion past the size of the ABD or the tape will not stick.    After visit thought:  Pt may benefit from use of Stoma Care wipe to periwound skin which will sooth and condition without greasy residue for adhesive adhesion.    Treatment Goal:  Improve condition of periwound skin.  Minimize periwound crusting.  Continued epithelial migration.  Prevent infection.    Supplies provided:  Gave samples of 3M blue gentle tape and 3M Micropore tape for pt to trial to see if they cause less irritation while adhering to the skin.  Antifungal cream.  5x9 ABD's.    Education:  Wound care instructions/education provided. Patient verbalized understanding of instruction/education.    Nurse face to face time: 35min    CC: Jeremy Glasgow

## 2017-06-20 NOTE — IP AVS SNAPSHOT
[facility]    [address] [address]    Phone:  [phone]    Fax:  [phone]                                       After Visit Summary   6/20/2017    Trevor Alexandre    MRN: [id number]           After Visit Summary Signature Page     I have received my discharge instructions, and my questions have been answered. I have discussed any challenges I see with this plan with the nurse or doctor.    ..........................................................................................................................................  Patient/Patient Representative Signature      ..........................................................................................................................................  Patient Representative Print Name and Relationship to Patient    ..................................................               ................................................  Date                                            Time    ..........................................................................................................................................  Reviewed by Signature/Title    ...................................................              ..............................................  Date                                                            Time

## 2017-06-20 NOTE — IP AVS SNAPSHOT
MRN:[id number]                      After Visit Summary   6/20/2017    Trevor Alexandre    MRN: [id number]           Visit Information        Provider Department      6/20/2017  1:00 PM [facility]           Review of your medicines      UNREVIEWED medicines. Ask your doctor about these medicines        Dose / Directions    Dakins 0.25 % Soln   Used for:  Open wound of back, unspecified laterality, subsequent encounter        Use as directed, daily damp to dry dressing   Quantity:  1 Bottle   Refills:  1       DICLOFENAC SODIUM PO        Dose:  50 mg   Take 50 mg by mouth 2 times daily   Refills:  0       GLIPIZIDE PO        Dose:  7.5 mg   Take 7.5 mg by mouth 2 times daily (before meals)   Refills:  0       HYDROcodone-acetaminophen 5-325 MG per tablet   Commonly known as:  NORCO        Dose:  1 tablet   Take 1 tablet by mouth every 6 hours as needed for moderate to severe pain (for arthritis)   Refills:  0       LISINOPRIL PO        Dose:  5 mg   Take 5 mg by mouth daily   Refills:  0       metFORMIN 500 MG 24 hr tablet   Commonly known as:  GLUCOPHAGE-XR        Dose:  2000 mg   Take 2,000 mg by mouth daily   Refills:  0         CONTINUE these medicines which have NOT CHANGED        Dose / Directions    order for DME   Used for:  Open wound of back, unspecified laterality, subsequent encounter        Sleeve of non-sterile 4 x 4 gauze   Quantity:  1 Package   Refills:  3                Protect others around you: Learn how to safely use, store and throw away your medicines at www.disposemymeds.org.         Follow-ups after your visit        Your next 10 appointments already scheduled     Jun 27, 2017  2:00 PM CDT   Return Visit with [facility]   [facility] ([facility] )    [address] [place] [id number] [phone]               Care Instructions        Further instructions from your care team       Daily dressing change:  Cleanse as before.  Do  "not need to use barrier wipe.  Apply small amount of Vaseline to immediate skin surrounding wound were crusting develops.  Dress with dakins moist gauze as before, try to put minimal adhesive on skin to affix dressing (try new tapes provided).  After dressing is taped down apply small amount of cream to any irritated skin surrounding.  If and ABD is needed for weeping skin surrounding wound make sure you do not apply the lotion past the size of the ABD or the tape will not stick.       Additional Information About Your Visit        CardMunch Information     [employer] lets you send messages to your doctor, view your test results, renew your prescriptions, schedule appointments and more. To sign up, go to [email or web address] . Click on \"Log in\" on the left side of the screen, which will take you to the Welcome page. Then click on \"Sign up Now\" on the right side of the page.     You will be asked to enter the access code listed below, as well as some personal information. Please follow the directions to create your username and password.     Your access code is: [email or web address]  Expires: 2017  3:18 PM     Your access code will  in 90 days. If you need help or a new code, please call your [facility] clinic or [phone].        Care EveryWhere ID     This is your Care EveryWhere ID. This could be used by other organizations to access your [facility] medical records  [id number]        Your Vitals Were     Blood Pressure Pulse Temperature             151/91 73 98.5  F (36.9  C) (Tympanic)          Primary Care Provider Office Phone # Fax #    Jeremy PATRICK DO [facility] [phone].      Thank you!     Thank you for choosing [facility] for your care. Our goal is always to provide you with excellent care. Hearing back from our patients is one way we can continue to improve our services. Please take a few minutes to complete the written survey that you may receive in the mail after you visit with us. Thank " you!             Medication List: This is a list of all your medications and when to take them. Check marks below indicate your daily home schedule. Keep this list as a reference.      Medications           Morning Afternoon Evening Bedtime As Needed    Dakins 0.25 % Soln   Use as directed, daily damp to dry dressing                                DICLOFENAC SODIUM PO   Take 50 mg by mouth 2 times daily                                GLIPIZIDE PO   Take 7.5 mg by mouth 2 times daily (before meals)                                HYDROcodone-acetaminophen 5-325 MG per tablet   Commonly known as:  NORCO   Take 1 tablet by mouth every 6 hours as needed for moderate to severe pain (for arthritis)                                LISINOPRIL PO   Take 5 mg by mouth daily                                metFORMIN 500 MG 24 hr tablet   Commonly known as:  GLUCOPHAGE-XR   Take 2,000 mg by mouth daily                                order for DME   Sleeve of non-sterile 4 x 4 gauze

## 2017-06-27 ENCOUNTER — HOSPITAL ENCOUNTER (OUTPATIENT)
Dept: WOUND CARE | Facility: HOSPITAL | Age: 72
Discharge: HOME | End: 2017-06-27
Attending: FAMILY MEDICINE | Admitting: FAMILY MEDICINE
Payer: COMMERCIAL

## 2017-06-27 VITALS — TEMPERATURE: 98 F | HEART RATE: 75 BPM | DIASTOLIC BLOOD PRESSURE: 77 MMHG | SYSTOLIC BLOOD PRESSURE: 122 MMHG

## 2017-06-27 PROCEDURE — 99212 OFFICE O/P EST SF 10 MIN: CPT

## 2017-06-27 NOTE — PROGRESS NOTES
"Trevor Alexandre, 1945  Chief Complaint   Patient presents with     WOUND CARE     open wound of back       Patient Active Problem List   Diagnosis     Abscess of back     Open wound of back     Dehiscence of surgical wound, subsequent encounter       Concerns/Comments since last visits: Here for follow up of open back wound.  This was previously treated with a wound vac.  Dtr is doing daily Dakin's dressings with dry gauze and paper tape.  Skin irritation has lessened with the paper tape.  He has had issues with + MRSA wound cultures and once again has a greenish tinge to his drainage.  He prefers not to take oral Abx    Objective:      06/27/17 1400   Wound 03/27/17 Back   Date First Assessed/Time First Assessed: 03/27/17 0810   Location: Back   Site Assessment Red;Moist;Granulation tissue   Raisa-wound Assessment (crusting along edges)   Size - Length x Width x Depth (cm) 3.2 x 2.1   Drainage Amount Small   Drainage Color/Charcteristics Fecal  (gray/green)   Wound Care/Cleansing Soap and water   Dressing (collagen; dry gauze)   Dressing Status New dressing   Wound Description (WOC) Full thickness   State of Healing (WOC) Fully granulated   Wound/Skin Edges (WOC) Attached edges       Procedures:   Cleansed with antibacterial soap and evaluated.  Dressing per plan of care    Assessments  Continues to improve but has grayish/green milky drainage - no odor.      Plan of care:   D/C Dakins at this time  Will try Hibiclens to clean wound x 4 days  Collagen to wound base  Dry gauze and paper tape  May go back to Dakin's if wound deteriorates  Return next week    Treatment Goals:  Prevent infection with Hibiclens, provide collagen to \"boost\" wound healing    Supplies provided:  Aloe prep wipes as skin protectant  Collagen  Hibiclens    Education:  Wound care instructions/education provided via AVS. Patient verbalized understanding of instruction/education.    Nurse face to face time: 25 min    CC: Jeremy Glasgow " A

## 2017-06-27 NOTE — IP AVS SNAPSHOT
MRN:[id number]                      After Visit Summary   6/27/2017    Trevor Alexandre    MRN: [id number]           Visit Information        Provider Department      6/27/2017  2:00 PM [facility]           Review of your medicines      UNREVIEWED medicines. Ask your doctor about these medicines        Dose / Directions    Dakins 0.25 % Soln   Used for:  Open wound of back, unspecified laterality, subsequent encounter        Use as directed, daily damp to dry dressing   Quantity:  1 Bottle   Refills:  1       DICLOFENAC SODIUM PO        Dose:  50 mg   Take 50 mg by mouth 2 times daily   Refills:  0       GLIPIZIDE PO        Dose:  7.5 mg   Take 7.5 mg by mouth 2 times daily (before meals)   Refills:  0       HYDROcodone-acetaminophen 5-325 MG per tablet   Commonly known as:  NORCO        Dose:  1 tablet   Take 1 tablet by mouth every 6 hours as needed for moderate to severe pain (for arthritis)   Refills:  0       LISINOPRIL PO        Dose:  5 mg   Take 5 mg by mouth daily   Refills:  0       metFORMIN 500 MG 24 hr tablet   Commonly known as:  GLUCOPHAGE-XR        Dose:  2000 mg   Take 2,000 mg by mouth daily   Refills:  0         CONTINUE these medicines which have NOT CHANGED        Dose / Directions    order for DME   Used for:  Open wound of back, unspecified laterality, subsequent encounter        Sleeve of non-sterile 4 x 4 gauze   Quantity:  1 Package   Refills:  3                Protect others around you: Learn how to safely use, store and throw away your medicines at www.disposemymeds.org.         Follow-ups after your visit        Your next 10 appointments already scheduled     Jul 03, 2017  1:00 PM CDT   Return Visit with [facility]   [facility] ([facility] )    [address] [place] [address] [phone]               Care Instructions        Further instructions from your care team       Dressing instructions:    Remove dressing and  "cleanse with Hibiclens - be sure to remove any collagen that may have built up by using a piece of gauze  Place collagen into wound base  Cover with dry gauze and secure.    If wound does not improve or worsens, to back to Dakins damp to dry dressings.         Additional Information About Your Visit        MyChart Information     [employer] lets you send messages to your doctor, view your test results, renew your prescriptions, schedule appointments and more. To sign up, go to [email or web address] . Click on \"Log in\" on the left side of the screen, which will take you to the Welcome page. Then click on \"Sign up Now\" on the right side of the page.     You will be asked to enter the access code listed below, as well as some personal information. Please follow the directions to create your username and password.     Your access code is: [email or web address]  Expires: 2017  2:21 PM     Your access code will  in 90 days. If you need help or a new code, please call your [facility] clinic or [phone].        Care EveryWhere ID     This is your Care EveryWhere ID. This could be used by other organizations to access your [facility] medical records  [id number]        Your Vitals Were     Blood Pressure Pulse Temperature             122/77 75 98  F (36.7  C) (Tympanic)          Primary Care Provider Office Phone # Fax #    Jeremy Glasgow [phone].      Equal Access to Services     [facility]: Hadii refugio lipscombo Sorobbali, waaxda luqadaha, qaybta kaalmada adeegyada, alyssa overton . So [facility] [phone].    ATENCIÓN: Si habla español, tiene a patrick disposición servicios gratuitos de asistencia lingüística. Kallie al [phone].    We comply with applicable federal civil rights laws and [place] laws. We do not discriminate on the basis of race, color, national origin, age, disability sex, sexual orientation or gender identity.            Thank you!     Thank you for " choosing [facility] for your care. Our goal is always to provide you with excellent care. Hearing back from our patients is one way we can continue to improve our services. Please take a few minutes to complete the written survey that you may receive in the mail after you visit with us. Thank you!             Medication List: This is a list of all your medications and when to take them. Check marks below indicate your daily home schedule. Keep this list as a reference.      Medications           Morning Afternoon Evening Bedtime As Needed    Dakins 0.25 % Soln   Use as directed, daily damp to dry dressing                                DICLOFENAC SODIUM PO   Take 50 mg by mouth 2 times daily                                GLIPIZIDE PO   Take 7.5 mg by mouth 2 times daily (before meals)                                HYDROcodone-acetaminophen 5-325 MG per tablet   Commonly known as:  NORCO   Take 1 tablet by mouth every 6 hours as needed for moderate to severe pain (for arthritis)                                LISINOPRIL PO   Take 5 mg by mouth daily                                metFORMIN 500 MG 24 hr tablet   Commonly known as:  GLUCOPHAGE-XR   Take 2,000 mg by mouth daily                                order for DME   Sleeve of non-sterile 4 x 4 gauze

## 2017-06-27 NOTE — DISCHARGE INSTRUCTIONS
Dressing instructions:    Remove dressing and cleanse with Hibiclens - be sure to remove any collagen that may have built up by using a piece of gauze  Place collagen into wound base  Cover with dry gauze and secure.    If wound does not improve or worsens, to back to Dakins damp to dry dressings.

## 2017-07-03 ENCOUNTER — HOSPITAL ENCOUNTER (OUTPATIENT)
Dept: WOUND CARE | Facility: HOSPITAL | Age: 72
Discharge: HOME | End: 2017-07-03
Attending: FAMILY MEDICINE | Admitting: FAMILY MEDICINE
Payer: COMMERCIAL

## 2017-07-03 VITALS — TEMPERATURE: 98 F | HEART RATE: 81 BPM | SYSTOLIC BLOOD PRESSURE: 125 MMHG | DIASTOLIC BLOOD PRESSURE: 84 MMHG

## 2017-07-03 PROCEDURE — 99213 OFFICE O/P EST LOW 20 MIN: CPT

## 2017-07-03 NOTE — PROGRESS NOTES
Trevor Alexandre, 1945  Chief Complaint   Patient presents with     WOUND CARE       Patient Active Problem List   Diagnosis     Abscess of back     Open wound of back     Dehiscence of surgical wound, subsequent encounter       Concerns/Comments:   Trevor is here for reevaluation and tx of back abscess secondary to cyst infection. Per Trevor he has had a long standing quarter sized cyst to his back that in February 2017 became infected. This was debrided 3/22/2017 and a wound vac therapy was initiated until the wound fully granulated. Denies pain to back wound. Has had recent issues with bursitis pain in his hips that has resolved, continues to have arthritic knee pain. Wound culture that was obtained 5/25/2017 grew out light growth of MRSA; pt would like to hold off on abx tx unless necessary.  Pruritis to periwound skin and skin irritation from tape have improved with the use of paper tape.    Objective:         07/03/17 1500   Wound 03/27/17 Back   Date First Assessed/Time First Assessed: 03/27/17 0810   Location: Back   Site Assessment Red;Moist;Granulation tissue   Raisa-wound Assessment (Crusting lifted with debridment)   Size - Length x Width x Depth (cm) 2.5x1.6   Drainage Amount Small   Drainage Color/Charcteristics Serosanguinous;Creamy;Green   Wound Care/Cleansing Soap and water   Dressing (NS moist collage;gauze;Micropore)   Dressing Status New dressing   Wound Description (WOC) Full thickness   State of Healing (WOC) Fully granulated   Wound/Skin Edges (WOC) Attached edges   Procedures:   Wound bed/periwound skin cleansed and evaluated. Vaseline applied to immediate periwound skin to assist in softening crusting; was able to remove with gentle cleansing. Dressed wound as described above, tried to apply tape minimally.     Assessment/Response to tx:  Wound continues to epithelialize.  Develops crusting to skin immediately surrounding wound; lifts easily when softened with Vaseline.     Plan of care:    Minimize adhesive on skin to what is absolutely necessary.  Moisten collagen with NS due to small amt of drainage to activate.  May alternate with Dakins wet to dry dressings if drainage increases or becomes more milky.  Return in one week.     Treatment Goal:  Continued epithelial migration.  Prevent infection.     Supplies provided:  Collagen  NS bullets     Education:  Wound care instructions/education provided. Patient verbalized understanding of instruction/education.     Nurse face to face time: 35min     CC: Jeremy Glasgow

## 2017-07-11 ENCOUNTER — HOSPITAL ENCOUNTER (OUTPATIENT)
Dept: WOUND CARE | Facility: HOSPITAL | Age: 72
Discharge: HOME | End: 2017-07-11
Attending: FAMILY MEDICINE | Admitting: FAMILY MEDICINE
Payer: COMMERCIAL

## 2017-07-11 VITALS — DIASTOLIC BLOOD PRESSURE: 91 MMHG | HEART RATE: 78 BPM | SYSTOLIC BLOOD PRESSURE: 140 MMHG | TEMPERATURE: 98.2 F

## 2017-07-11 PROCEDURE — 99212 OFFICE O/P EST SF 10 MIN: CPT

## 2017-07-11 NOTE — PROGRESS NOTES
Trevor VIOLET Alexandre, 1945  Chief Complaint   Patient presents with     WOUND CARE     open wound of back       Patient Active Problem List   Diagnosis     Abscess of back     Open wound of back     Dehiscence of surgical wound, subsequent encounter       Concerns/Comments since last visits: has no specific complaints and has been feeling well.  Here for follow up of an open wound of his back s/p debridement of abscess that has previously been treated with a wound vac.  He has been using collagen for approx 2 weeks.  His dtr is doing his dressing changes daily.    Objective:      07/11/17 1300   Wound 03/27/17 Back   Date First Assessed/Time First Assessed: 03/27/17 0810   Location: Back   Site Assessment Red;Granulation tissue;Epithelialization   Raisa-wound Assessment WDL except  (encrustations)   Size - Length x Width x Depth (cm) 1.4 x 0.6   Drainage Amount Scant   Drainage Color/Charcteristics Serosanguinous;Green   Wound Care/Cleansing Soap and water   Dressing (NS moistened collagen, gauze, paper tape)   Dressing Status New dressing   Wound Description (WOC) Full thickness   State of Healing (WOC) Fully granulated   Wound/Skin Edges (WOC) Attached edges       Procedures:   Cleansed and evaluated.  Used petrolatum to gently lift encrustations    Assessments  Slight bleeding to fragile raisa wound epithelium when removing encrustations with pickups.  Significant improvement in size of wound that is surrounded by fragile epithelial tissue    Plan of care:   Continue with daily saline moistened collagen and dry gauze  Return in 2 weeks    Treatment Goals:  Full re-epithelialization of wound bed    Supplies provided:  Collagen, saline bullets    Education:  Wound care instructions/education provided. Patient verbalized understanding of instruction/education.    Nurse face to face time: 20 min    CC: Jeremy Glasgow

## 2017-07-25 ENCOUNTER — HOSPITAL ENCOUNTER (OUTPATIENT)
Dept: WOUND CARE | Facility: HOSPITAL | Age: 72
Discharge: HOME | End: 2017-07-25
Attending: FAMILY MEDICINE | Admitting: FAMILY MEDICINE
Payer: COMMERCIAL

## 2017-07-25 VITALS — HEART RATE: 75 BPM | TEMPERATURE: 97.9 F | SYSTOLIC BLOOD PRESSURE: 166 MMHG | DIASTOLIC BLOOD PRESSURE: 88 MMHG

## 2017-07-25 PROCEDURE — 99211 OFF/OP EST MAY X REQ PHY/QHP: CPT

## 2017-07-25 NOTE — PROGRESS NOTES
"Trevor Alexandre, 1945  Chief Complaint   Patient presents with     WOUND CARE     abscess of back       Patient Active Problem List   Diagnosis     Abscess of back     Open wound of back     Dehiscence of surgical wound, subsequent encounter       Concerns/Comments since last visits: follow up visit for patient with large back abscess that at one point had been treated with a wound vac.  Most recent treatment was collagen with dry gauze.  He said it looked the wound \"closed up about a week or so ago\".  Has kept a dry gauze over it.  [place]      Objective:      07/25/17 1300   Wound 03/27/17 Back   Date First Assessed/Time First Assessed: 03/27/17 0810   Location: Back   Site Assessment Epithelialization  (crusting)   Raisa-wound Assessment WDL except;Pustules  (scattered pustules from folliculitis)   Drainage Amount None   Wound Care/Cleansing Soap and water   Dressing Open to air   State of Healing (WOC) Epithelialized       Procedures:   Used petroleum jelly to soften encrustations in preparation for lifting them.  The majority of the encrustations were removed, except a small amount near the center.  This area is very adherent and will lift the new epithelial tissue is removed so these were moisturized with petroleum jelly    Assessments  Fully epithelialized with some adherent encrustations  3 - 5 tiny pustules at base of hairs from what appears to be folliculitis    Plan of care:   Open to air, keep area moistened with moisture cream or petroleum jelly  Monitor for signs of infection  Antibacterial soap for cleansing back     Treatment Goals:  Goals met - wound has epithelialized    Supplies provided:  n/a    Education:  Wound care instructions/education provided. Instructed to monitor for signs of infection (fever, chills, increased redness, pus, foul odor,  increased pain) and been seen by provider if present   Patient verbalized understanding of instruction/education.    Nurse " face to face time: 10 min    CC: Jeremy Glasgow/Kevin Dale

## 2018-02-05 ENCOUNTER — AMBULATORY - GICH (OUTPATIENT)
Dept: SCHEDULING | Facility: OTHER | Age: 73
End: 2018-02-05

## 2018-02-08 ENCOUNTER — DOCUMENTATION ONLY (OUTPATIENT)
Dept: FAMILY MEDICINE | Facility: OTHER | Age: 73
End: 2018-02-08

## 2018-02-08 RX ORDER — HYDROCODONE BITARTRATE AND ACETAMINOPHEN 5; 325 MG/1; MG/1
1 TABLET ORAL EVERY 4 HOURS PRN
COMMUNITY
End: 2018-02-12

## 2018-02-08 RX ORDER — LISINOPRIL 10 MG/1
10 TABLET ORAL DAILY
COMMUNITY
End: 2018-02-12

## 2018-02-08 RX ORDER — GLIPIZIDE 5 MG/1
5 TABLET ORAL DAILY
COMMUNITY
End: 2018-02-12

## 2018-02-08 RX ORDER — ATORVASTATIN CALCIUM 80 MG/1
80 TABLET, FILM COATED ORAL DAILY
COMMUNITY
End: 2018-02-12

## 2018-02-12 ENCOUNTER — OFFICE VISIT (OUTPATIENT)
Dept: INTERNAL MEDICINE | Facility: OTHER | Age: 73
End: 2018-02-12
Attending: INTERNAL MEDICINE
Payer: MEDICARE

## 2018-02-12 VITALS
WEIGHT: 220 LBS | DIASTOLIC BLOOD PRESSURE: 98 MMHG | SYSTOLIC BLOOD PRESSURE: 154 MMHG | HEART RATE: 76 BPM | HEIGHT: 68 IN | BODY MASS INDEX: 33.34 KG/M2

## 2018-02-12 DIAGNOSIS — E11.319 TYPE 2 DIABETES MELLITUS WITH RETINOPATHY OF LEFT EYE, WITHOUT LONG-TERM CURRENT USE OF INSULIN, MACULAR EDEMA PRESENCE UNSPECIFIED, UNSPECIFIED RETINOPATHY SEVERITY (H): Primary | ICD-10-CM

## 2018-02-12 DIAGNOSIS — I10 BENIGN ESSENTIAL HYPERTENSION: ICD-10-CM

## 2018-02-12 DIAGNOSIS — E78.5 HYPERLIPIDEMIA LDL GOAL <100: ICD-10-CM

## 2018-02-12 PROBLEM — E11.39 TYPE 2 DIABETES MELLITUS WITH OPHTHALMIC COMPLICATION (H): Status: ACTIVE | Noted: 2018-02-12

## 2018-02-12 LAB — HBA1C MFR BLD: 7 % (ref 4–6)

## 2018-02-12 PROCEDURE — 99214 OFFICE O/P EST MOD 30 MIN: CPT | Performed by: INTERNAL MEDICINE

## 2018-02-12 PROCEDURE — G0463 HOSPITAL OUTPT CLINIC VISIT: HCPCS

## 2018-02-12 PROCEDURE — 83036 HEMOGLOBIN GLYCOSYLATED A1C: CPT | Performed by: INTERNAL MEDICINE

## 2018-02-12 PROCEDURE — 36415 COLL VENOUS BLD VENIPUNCTURE: CPT | Performed by: INTERNAL MEDICINE

## 2018-02-12 RX ORDER — GLIPIZIDE 5 MG/1
7.5 TABLET ORAL
Qty: 30 TABLET | COMMUNITY
Start: 2018-02-12 | End: 2018-10-12

## 2018-02-12 RX ORDER — METFORMIN HCL 500 MG
2000 TABLET, EXTENDED RELEASE 24 HR ORAL DAILY
Qty: 30 TABLET | COMMUNITY
Start: 2018-02-12 | End: 2018-12-28

## 2018-02-12 RX ORDER — ATORVASTATIN CALCIUM 80 MG/1
40 TABLET, FILM COATED ORAL DAILY
Qty: 45 TABLET | Refills: 3 | COMMUNITY
Start: 2018-02-12 | End: 2018-03-06

## 2018-02-12 RX ORDER — LISINOPRIL 20 MG/1
20 TABLET ORAL DAILY
COMMUNITY
Start: 2018-02-12 | End: 2018-02-21

## 2018-02-12 RX ORDER — LISINOPRIL 10 MG/1
5 TABLET ORAL DAILY
Qty: 30 TABLET | COMMUNITY
Start: 2018-02-12 | End: 2018-02-12

## 2018-02-12 NOTE — PROGRESS NOTES
"Chief Complaint:  This patient is here for an eye concern.    HPI: This patient was recently at the eye doctor and apparently had some \"broken blood vessels \"in his eye.  This was mainly just in the left eye.  I do not have those records to look at.  He was at the [facility] and had his eye exam done.  The patient has history of diabetes as well as hypertension.  [facility] not [facility] for some time.  He is here today to have this addressed further.    I spent time today reconciling his medications.  He was not on aspirin therapy.  He is not on statin therapy as he tells me that that prescription ran out.  He is on low-dose ACE inhibitor as well as his diabetic meds.  He tells me that his last A1c sometime last year was around 8.0.    Past Medical History:   Diagnosis Date     Abscess of back 03/23/2017     Open wound of back 03/23/2017       Past Surgical History:   Procedure Laterality Date     EXAM UNDER ANESTHESIA, CHANGE DRESSING (LOCATION), COMBINED N/A 3/24/2017    Procedure: COMBINED EXAM UNDER ANESTHESIA, CHANGE DRESSING (LOCATION);  Surgeon: Tahir De Jesus DO;  Location: [facility]     EXCISE LESION BACK N/A 3/22/2017    Procedure: EXCISE LESION BACK;  Surgeon: Servando Dale MD;  Location: [facility]     IRRIGATION AND DEBRIDEMENT TRUNK, COMBINED N/A 3/23/2017    Procedure: COMBINED IRRIGATION AND DEBRIDEMENT TRUNK;  Surgeon: Servando Dale MD;  Location: [facility]       History reviewed. No pertinent family history.    Social History     Social History     Marital status:      Spouse name: N/A     Number of children: N/A     Years of education: N/A     Occupational History     Not on file.     Social History Main Topics     Smoking status: Former Smoker     Types: Cigarettes     Quit date: 3/27/2002     Smokeless tobacco: Never Used     Alcohol use Not on file     Drug use: Not on file     Sexual activity: Not on file     Other Topics Concern     " Not on file     Social History Narrative        No Known Allergies    Current Outpatient Prescriptions   Medication Sig Dispense Refill     glipiZIDE (GLUCOTROL) 5 MG tablet Take 1.5 tablets (7.5 mg) by mouth 2 times daily (before meals) 30 tablet      metFORMIN (GLUCOPHAGE-XR) 500 MG 24 hr tablet Take 4 tablets (2,000 mg) by mouth daily 30 tablet      atorvastatin (LIPITOR) 80 MG tablet Take 0.5 tablets (40 mg) by mouth daily 45 tablet 3     aspirin 81 MG EC tablet Take 1 tablet (81 mg) by mouth daily 90 tablet 3     lisinopril (PRINIVIL/ZESTRIL) 20 MG tablet Take 1 tablet (20 mg) by mouth daily       [DISCONTINUED] lisinopril (PRINIVIL/ZESTRIL) 10 MG tablet Take 0.5 tablets (5 mg) by mouth daily 30 tablet      [DISCONTINUED] atorvastatin (LIPITOR) 80 MG tablet Take 80 mg by mouth daily . Take one-half tablet daily       [DISCONTINUED] glipiZIDE (GLUCOTROL) 5 MG tablet Take 5 mg by mouth daily Before a meal. Take one-half tablet twice a day       [DISCONTINUED] lisinopril (PRINIVIL/ZESTRIL) 10 MG tablet Take 10 mg by mouth daily . One - half tablet every day       [DISCONTINUED] metFORMIN (GLUCOPHAGE-XR) 500 MG 24 hr tablet Take 2,000 mg by mouth daily       [DISCONTINUED] GLIPIZIDE PO Take 7.5 mg by mouth 2 times daily (before meals)       [DISCONTINUED] LISINOPRIL PO Take 5 mg by mouth daily         Review of Systems   All other systems reviewed and are negative.      Physical Exam   Constitutional: He appears well-developed and well-nourished. No distress.   Skin: He is not diaphoretic.   Nursing note and vitals reviewed.      Assessment:      ICD-10-CM    1. 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 Benign essential hypertension I10    3. Hyperlipidemia LDL goal <100 E78.5         Plan:    This patient has apparent retinopathy.  I do not have the actual  details from ophthalmology to review.  His blood pressure is not controlled so I am going to increase his lisinopril from 5 mg daily up to 20 mg daily.  Diabetic control is uncertain, A1c is drawn and pending and I will send him a letter with the results.  Obviously we will need to get good control of his diabetes.  Recommended that he start aspirin 81 mg daily and that he also restart Lipitor 80 mg one half tab daily, prescription for latter was sent to his pharmacy.  Recheck with me in 3 weeks for follow-up and further testing as warranted.

## 2018-02-12 NOTE — NURSING NOTE
The patient is here today to be seen for possible broken blood vessels in his left eye.  Hawa Brooks LPN

## 2018-02-12 NOTE — LETTER
February 12, 2018          Trevor Alexandre    [address] [place] [place] [address]        Dear Trevor Alexandre,    Below are the results of your recent labs:    Results for orders placed or performed in visit on 02/12/18   Hemoglobin A1c   Result Value Ref Range    Hemoglobin A1C 7.0 (H) 4.0 - 6.0 %        Your A1c looks great.  This means that your diabetic control is very good.  Congratulations on this report.      Sincerely,        Landen Gavin MD  Internal Medicine  [facility]

## 2018-02-12 NOTE — MR AVS SNAPSHOT
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      Your next 10 appointments already scheduled     Mar 06, 2018  2:00 PM CST   [facility] with Landen Gavin MD   [facility]    [address]  [facility] [id number] [phone]              Who to contact     If you have questions or need follow up information about today's clinic visit or your schedule please contact [facility] directly at [phone].  Normal or non-critical lab and imaging results will be communicated to you by [employer], letter or phone within 4 business days after the clinic has received the results. If you do not hear from us within 7 days, please contact the clinic through [employer] or phone. If you have a critical or abnormal lab result, we will notify you by phone as soon as possible.  Submit refill requests through [employer] or call your pharmacy and they will forward the refill request to us. Please allow 3 business days for your refill to be completed.          Additional Information About Your Visit        [employer] Forumsâ„¢ Network Information     [employer] lets you send messages to your  "doctor, view your test results, renew your prescriptions, schedule appointments and more. To sign up, go to [email or web address] . Click on \"Log in\" on the left side of the screen, which will take you to the Welcome page. Then click on \"Sign up Now\" on the right side of the page.     You will be asked to enter the access code listed below, as well as some personal information. Please follow the directions to create your username and password.     Your access code is: [email or web address]  Expires: 2018  3:42 PM     Your access code will  in 90 days. If you need help or a new code, please call your [facility] clinic or [phone].        Care EveryWhere ID     This is your Care EveryWhere ID. This could be used by other organizations to access your [facility] medical records  [id number]        Your Vitals Were     Pulse Height BMI (Body Mass Index)             76 5' 8\" (1.727 m) 33.45 kg/m2          Blood Pressure from Last 3 Encounters:   18 (!) 154/98   17 166/88   17 140/91    Weight from Last 3 Encounters:   18 220 lb (99.8 kg)   17 211 lb (95.7 kg)   17 213 lb (96.6 kg)              We Performed the Following     Hemoglobin A1c          Today's Medication Changes          These changes are accurate as of 18  3:42 PM.  If you have any questions, ask your nurse or doctor.               These medicines have changed or have updated prescriptions.        Dose/Directions    atorvastatin 80 MG tablet   Commonly known as:  LIPITOR   This may have changed:    - how much to take  - additional instructions   Changed by:  Landen Gavin MD        Dose:  40 mg   Take 0.5 tablets (40 mg) by mouth daily   Quantity:  45 tablet   Refills:  3       glipiZIDE 5 MG tablet   Commonly known as:  GLUCOTROL   This may have changed:    - how much to take  - when to take this  - additional instructions  - Another medication with the same name was removed. Continue taking this medication, " and follow the directions you see here.   Changed by:  Landen Gavin MD        Dose:  7.5 mg   Take 1.5 tablets (7.5 mg) by mouth 2 times daily (before meals)   Quantity:  30 tablet   Refills:  0       lisinopril 20 MG tablet   Commonly known as:  PRINIVIL/ZESTRIL   This may have changed:    - medication strength  - how much to take   Changed by:  Landen Gavin MD        Dose:  20 mg   Take 1 tablet (20 mg) by mouth daily   Refills:  0         Stop taking these medicines if you haven't already. Please contact your care team if you have questions.     HYDROcodone-acetaminophen 5-325 MG per tablet   Commonly known as:  NORCO   Stopped by:  Landen Gavin MD                    Primary Care Provider Office Phone # Fax #    Jeremy Glasgow [phone].       [facility] [address] [place] [id number]        Equal Access to Services     [facility]: Hadii refugio ku hadasho Soomaali, waaxda luqadaha, qaybta kaalmada adeegyada, waxay kellyin hayciscon lauren overton . So [facility] [phone].    ATENCIÓN: Si habla español, tiene a patrick disposición servicios gratuitos de asistencia lingüística. Kallie al [phone].    We comply with applicable federal civil rights laws and [place] laws. We do not discriminate on the basis of race, color, national origin, age, disability, sex, sexual orientation, or gender identity.            Thank you!     Thank you for choosing [facility] AND [facility]  for your care. Our goal is always to provide you with excellent care. Hearing back from our patients is one way we can continue to improve our services. Please take a few minutes to complete the written survey that you may receive in the mail after your visit with us. Thank you!             Your Updated Medication List - Protect others around you: Learn how to safely use, store and throw away your medicines at www.disposemymeds.org.          This list is accurate as of 2/12/18  3:42 PM.  Always  use your most recent med list.                   Brand Name Dispense Instructions for use Diagnosis    aspirin 81 MG EC tablet     90 tablet    Take 1 tablet (81 mg) by mouth daily        atorvastatin 80 MG tablet    LIPITOR    45 tablet    Take 0.5 tablets (40 mg) by mouth daily        glipiZIDE 5 MG tablet    GLUCOTROL    30 tablet    Take 1.5 tablets (7.5 mg) by mouth 2 times daily (before meals)        lisinopril 20 MG tablet    PRINIVIL/ZESTRIL     Take 1 tablet (20 mg) by mouth daily        metFORMIN 500 MG 24 hr tablet    GLUCOPHAGE-XR    30 tablet    Take 4 tablets (2,000 mg) by mouth daily

## 2018-02-12 NOTE — PATIENT INSTRUCTIONS
Start aspirin daily  Resume cholesterol pill daily  A1C today--I will send a letter  Increase lisinopril to 20 mg daily

## 2018-02-13 ASSESSMENT — PATIENT HEALTH QUESTIONNAIRE - PHQ9: SUM OF ALL RESPONSES TO PHQ QUESTIONS 1-9: 0

## 2018-02-21 ENCOUNTER — TELEPHONE (OUTPATIENT)
Dept: INTERNAL MEDICINE | Facility: OTHER | Age: 73
End: 2018-02-21

## 2018-02-21 DIAGNOSIS — E11.319 TYPE 2 DIABETES MELLITUS WITH RETINOPATHY OF LEFT EYE, WITHOUT LONG-TERM CURRENT USE OF INSULIN, MACULAR EDEMA PRESENCE UNSPECIFIED, UNSPECIFIED RETINOPATHY SEVERITY (H): Primary | ICD-10-CM

## 2018-02-21 RX ORDER — LISINOPRIL 20 MG/1
20 TABLET ORAL DAILY
Qty: 30 TABLET | Refills: 0 | Status: SHIPPED | OUTPATIENT
Start: 2018-02-21 | End: 2018-03-06

## 2018-02-21 NOTE — TELEPHONE ENCOUNTER
Patient wants a new prescription for Lisinopril 20 mg sent to [facility]. [facility] and wont have enough pills to last him until his follow up appointment in March. Please advise.  Clarissa Huffman............................... 2/21/2018 10:41 AM

## 2018-03-06 ENCOUNTER — OFFICE VISIT (OUTPATIENT)
Dept: INTERNAL MEDICINE | Facility: OTHER | Age: 73
End: 2018-03-06
Attending: INTERNAL MEDICINE
Payer: MEDICARE

## 2018-03-06 VITALS
DIASTOLIC BLOOD PRESSURE: 92 MMHG | SYSTOLIC BLOOD PRESSURE: 148 MMHG | HEART RATE: 76 BPM | WEIGHT: 220 LBS | BODY MASS INDEX: 33.45 KG/M2

## 2018-03-06 DIAGNOSIS — E11.319 TYPE 2 DIABETES MELLITUS WITH RETINOPATHY OF LEFT EYE, WITHOUT LONG-TERM CURRENT USE OF INSULIN, MACULAR EDEMA PRESENCE UNSPECIFIED, UNSPECIFIED RETINOPATHY SEVERITY (H): ICD-10-CM

## 2018-03-06 DIAGNOSIS — I10 BENIGN ESSENTIAL HYPERTENSION: Primary | ICD-10-CM

## 2018-03-06 DIAGNOSIS — E78.5 HYPERLIPIDEMIA LDL GOAL <100: ICD-10-CM

## 2018-03-06 PROCEDURE — G0463 HOSPITAL OUTPT CLINIC VISIT: HCPCS

## 2018-03-06 PROCEDURE — 99214 OFFICE O/P EST MOD 30 MIN: CPT | Performed by: INTERNAL MEDICINE

## 2018-03-06 RX ORDER — ATORVASTATIN CALCIUM 40 MG/1
40 TABLET, FILM COATED ORAL DAILY
Qty: 90 TABLET | Refills: 3 | Status: SHIPPED | OUTPATIENT
Start: 2018-03-06 | End: 2019-03-01

## 2018-03-06 RX ORDER — LISINOPRIL 40 MG/1
40 TABLET ORAL DAILY
Qty: 90 TABLET | Refills: 3 | COMMUNITY
Start: 2018-03-06 | End: 2018-03-16

## 2018-03-06 ASSESSMENT — ENCOUNTER SYMPTOMS
CONSTITUTIONAL NEGATIVE: 1
ENDOCRINE NEGATIVE: 1
HEMATOLOGIC/LYMPHATIC NEGATIVE: 1
ALLERGIC/IMMUNOLOGIC NEGATIVE: 1
EYES NEGATIVE: 1

## 2018-03-06 NOTE — PROGRESS NOTES
Chief Complaint:  Here for f/u on BP.    HPI: This patient is here today for follow-up on his blood pressure and other issues.  He has some sort of background retinopathy on my exam.  His A1c was 7.0 so his diabetic control is acceptable.  His blood pressure is not controlled and he was not taking aspirin or statin therapy.  I increased his lisinopril from 5 mg daily up to 20 mg daily.  He tolerates this without difficulty.  He tolerates his baby aspirin without problem.  He tells me that he is on the 40 mg of atorvastatin and he now has some diarrhea.  He might of had that in the past but it went away after 2 weeks on the atorvastatin.  He noticed that just starting today he no longer has the diarrhea so may be it has resolved.    Medications are reconciled.  Past medical history, past surgical history, family history and social histories are all reviewed and updated today.    Past Medical History:   Diagnosis Date     Abscess of back 03/23/2017     Diabetes mellitus, type 2 (H)      Hyperlipidemia      Hypertension        Past Surgical History:   Procedure Laterality Date     EXAM UNDER ANESTHESIA, CHANGE DRESSING (LOCATION), COMBINED N/A 3/24/2017    Procedure: COMBINED EXAM UNDER ANESTHESIA, CHANGE DRESSING (LOCATION);  Surgeon: Tahir De Jesus DO;  Location: [facility]     EXCISE LESION BACK N/A 3/22/2017    Procedure: EXCISE LESION BACK;  Surgeon: Servando Dale MD;  Location: [facility]     IRRIGATION AND DEBRIDEMENT TRUNK, COMBINED N/A 3/23/2017    Procedure: COMBINED IRRIGATION AND DEBRIDEMENT TRUNK;  Surgeon: Servando Dale MD;  Location: [facility]       No Known Allergies    Current Outpatient Prescriptions   Medication Sig Dispense Refill     lisinopril (PRINIVIL/ZESTRIL) 40 MG tablet Take 1 tablet (40 mg) by mouth daily 90 tablet 3     atorvastatin (LIPITOR) 40 MG tablet Take 1 tablet (40 mg) by mouth daily 90 tablet 3     glipiZIDE (GLUCOTROL) 5 MG tablet Take 1.5 tablets (7.5 mg) by mouth 2 times daily  (before meals) 30 tablet      metFORMIN (GLUCOPHAGE-XR) 500 MG 24 hr tablet Take 4 tablets (2,000 mg) by mouth daily 30 tablet      aspirin 81 MG EC tablet Take 1 tablet (81 mg) by mouth daily 90 tablet 3     [DISCONTINUED] lisinopril (PRINIVIL/ZESTRIL) 20 MG tablet Take 1 tablet (20 mg) by mouth daily 30 tablet 0     [DISCONTINUED] atorvastatin (LIPITOR) 80 MG tablet Take 0.5 tablets (40 mg) by mouth daily 45 tablet 3       Review of Systems   Constitutional: Negative.    Eyes: Negative.    Endocrine: Negative.    Skin: Negative.    Allergic/Immunologic: Negative.    Hematological: Negative.        Physical Exam   Constitutional: He appears well-developed and well-nourished. No distress.   Skin: He is not diaphoretic.   Nursing note and vitals reviewed.    142/90 on my recheck    Assessment:        ICD-10-CM    1. Benign essential hypertension I10    2. 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 Hyperlipidemia LDL goal <100 E78.5        Plan: His blood pressure is still a little bit on the high side.  Increase lisinopril from 20 mg daily up to 40 mg daily.  All other medications will continue unchanged.  Prescription refills were done.  I want him back in 6 weeks for recheck.  I would do a lipid panel as well as metabolic panel at that visit.  Return sooner for problems.  If his diarrhea persists, he could try stopping the atorvastatin to see if that is contributing.

## 2018-03-06 NOTE — NURSING NOTE
The patient is here today to have a recheck on his blood pressure.  SYLVESTER CORDOVA LPN 3/6/2018 2:01 PM

## 2018-03-06 NOTE — MR AVS SNAPSHOT
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  Normal or non-critical lab and imaging results will be communicated to you by Sensoraidehart, letter or phone within 4 business days after the clinic has received the results. If you do not hear from us within 7 days, please contact the clinic through Advaxist or phone. If you have a critical or abnormal lab result, we will notify you by phone as soon as possible.  Submit refill requests through ScoreStreak or call your pharmacy and they will forward the refill request to us. Please allow 3 business days for your refill to be completed.          Additional Information About Your Visit        Sensoraidehart Information     ScoreStreak lets you send messages to your doctor, view your test results, renew your prescriptions, schedule appointments and more. To sign up, go to [email or web address] . Click on \"Log in\" on the left side of the screen, which will take you to the Welcome page. Then click on \"Sign up Now\" on " the right side of the page.     You will be asked to enter the access code listed below, as well as some personal information. Please follow the directions to create your username and password.     Your access code is: [email or web address]  Expires: 2018  3:42 PM     Your access code will  in 90 days. If you need help or a new code, please call your [place] clinic or [phone].        Care EveryWhere ID     This is your Care EveryWhere ID. This could be used by other organizations to access your [place] medical records  [id number]        Your Vitals Were     Pulse BMI (Body Mass Index)                76 33.45 kg/m2           Blood Pressure from Last 3 Encounters:   18 (!) 148/92   18 (!) 154/98   17 166/88    Weight from Last 3 Encounters:   18 99.8 kg (220 lb)   18 99.8 kg (220 lb)   17 95.7 kg (211 lb)              Today, you had the following     No orders found for display         Today's Medication Changes          These changes are accurate as of 3/6/18  2:17 PM.  If you have any questions, ask your nurse or doctor.               These medicines have changed or have updated prescriptions.        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, without long-term current use of insulin, macular edema presence unspecified, unspecified retinopathy severity (H)   Changed by:  Landen Gavin  MD DARNELL        Dose:  40 mg   Take 1 tablet (40 mg) by mouth daily   Quantity:  90 tablet   Refills:  3            Where to get your medicines      These medications were sent to [id number] - [facility] [address]  [address] [address]     Phone:  [phone]     atorvastatin 40 MG tablet                Primary Care Provider Office Phone # Fax #    Landen Gavin [phone]       [address] [facility] [address]        Equal Access to Services     [facility]: Hadii aad ku hadasho Soomaali, waaxda luqadaha, qaybta kaalmada adeegyada, waxkelsea overton . So [facility] [phone].    ATENCIÓN: Si habla español, tiene a patrick disposición servicios gratuitos de asistencia lingüística. Ll[facility] [phone].    We comply with applicable federal civil rights laws and [place] laws. We do not discriminate on the basis of race, color, national origin, age, disability, sex, sexual orientation, or gender identity.            Thank you!     Thank you for choosing [facility] AND [facility]  for your care. Our goal is always to provide you with excellent care. Hearing back from our patients is one way we can continue to improve our services. Please take a few minutes to complete the written survey that you may receive in the mail after your visit with us. Thank you!             Your Updated Medication List - Protect others around you: Learn how to safely use, store and throw away your medicines at www.disposemymeds.org.          This list is accurate as of 3/6/18  2:17 PM.  Always use your most recent med list.                   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, without  long-term current use of insulin, macular edema presence unspecified, unspecified retinopathy severity (H)       glipiZIDE 5 MG tablet    GLUCOTROL    30 tablet    Take 1.5 tablets (7.5 mg) by mouth 2 times daily (before meals)        lisinopril 40 MG tablet    PRINIVIL/ZESTRIL    90 tablet    Take 1 tablet (40 mg) by mouth daily    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

## 2018-03-07 ASSESSMENT — PATIENT HEALTH QUESTIONNAIRE - PHQ9: SUM OF ALL RESPONSES TO PHQ QUESTIONS 1-9: 0

## 2018-03-16 ENCOUNTER — MYC REFILL (OUTPATIENT)
Dept: INTERNAL MEDICINE | Facility: OTHER | Age: 73
End: 2018-03-16

## 2018-03-16 ENCOUNTER — TELEPHONE (OUTPATIENT)
Dept: INTERNAL MEDICINE | Facility: OTHER | Age: 73
End: 2018-03-16

## 2018-03-16 DIAGNOSIS — E11.319 TYPE 2 DIABETES MELLITUS WITH RETINOPATHY OF LEFT EYE, WITHOUT LONG-TERM CURRENT USE OF INSULIN, MACULAR EDEMA PRESENCE UNSPECIFIED, UNSPECIFIED RETINOPATHY SEVERITY (H): ICD-10-CM

## 2018-03-16 RX ORDER — LISINOPRIL 40 MG/1
40 TABLET ORAL DAILY
Qty: 30 TABLET | Refills: 0 | Status: SHIPPED | OUTPATIENT
Start: 2018-03-16 | End: 2018-04-25

## 2018-03-16 NOTE — TELEPHONE ENCOUNTER
[facility]  Original authorizing provider: MD Trevor LOPEZ VIOLETMakayla Baldomero would like a refill of the following medications:  lisinopril (PRINIVIL/ZESTRIL) 40 MG tablet [NAYA CEDEÑO MD]    Preferred pharmacy: [facility] [address] [place] [address]    Comment:

## 2018-03-16 NOTE — TELEPHONE ENCOUNTER
The patient needs a small supply sent in to [facility]  SYLVESTER CORDOVA LPN 3/16/2018 11:32 AM

## 2018-03-21 RX ORDER — LISINOPRIL 40 MG/1
40 TABLET ORAL DAILY
Qty: 90 TABLET | Refills: 3 | OUTPATIENT
Start: 2018-03-21

## 2018-03-21 NOTE — TELEPHONE ENCOUNTER
refilled by MAF on 3/16/18 for #30 needs fu prior to add refills Luly Moses RN on 3/21/2018 at 3:44 PM

## 2018-03-22 NOTE — TELEPHONE ENCOUNTER
This message was sent to patient:   Thank You for your message.  A new script was sent over 3/16/18 to [facility] for 30 pills.  An appointment needs to be made for any further refills  Thank You   [facility]

## 2018-04-17 ENCOUNTER — OFFICE VISIT (OUTPATIENT)
Dept: INTERNAL MEDICINE | Facility: OTHER | Age: 73
End: 2018-04-17
Attending: INTERNAL MEDICINE
Payer: MEDICARE

## 2018-04-17 VITALS
SYSTOLIC BLOOD PRESSURE: 136 MMHG | HEART RATE: 68 BPM | BODY MASS INDEX: 33.3 KG/M2 | DIASTOLIC BLOOD PRESSURE: 88 MMHG | WEIGHT: 219 LBS

## 2018-04-17 DIAGNOSIS — I10 BENIGN ESSENTIAL HYPERTENSION: Primary | ICD-10-CM

## 2018-04-17 DIAGNOSIS — E78.5 HYPERLIPIDEMIA LDL GOAL <100: ICD-10-CM

## 2018-04-17 LAB
ALBUMIN SERPL-MCNC: 4.5 G/DL (ref 3.5–5.7)
ALP SERPL-CCNC: 76 U/L (ref 34–104)
ALT SERPL W P-5'-P-CCNC: 27 U/L (ref 7–52)
ANION GAP SERPL CALCULATED.3IONS-SCNC: 9 MMOL/L (ref 3–14)
AST SERPL W P-5'-P-CCNC: 20 U/L (ref 13–39)
BILIRUB SERPL-MCNC: 0.9 MG/DL (ref 0.3–1)
BUN SERPL-MCNC: 11 MG/DL (ref 7–25)
CALCIUM SERPL-MCNC: 9.7 MG/DL (ref 8.6–10.3)
CHLORIDE SERPL-SCNC: 103 MMOL/L (ref 98–107)
CHOLEST SERPL-MCNC: 111 MG/DL
CO2 SERPL-SCNC: 26 MMOL/L (ref 21–31)
CREAT SERPL-MCNC: 0.89 MG/DL (ref 0.7–1.3)
GFR SERPL CREATININE-BSD FRML MDRD: 84 ML/MIN/1.7M2
GLUCOSE SERPL-MCNC: 206 MG/DL (ref 70–105)
HDLC SERPL-MCNC: 34 MG/DL (ref 23–92)
LDLC SERPL CALC-MCNC: 48 MG/DL
NONHDLC SERPL-MCNC: 77 MG/DL
POTASSIUM SERPL-SCNC: 3.9 MMOL/L (ref 3.5–5.1)
PROT SERPL-MCNC: 7.6 G/DL (ref 6.4–8.9)
SODIUM SERPL-SCNC: 138 MMOL/L (ref 134–144)
TRIGL SERPL-MCNC: 144 MG/DL

## 2018-04-17 PROCEDURE — 80053 COMPREHEN METABOLIC PANEL: CPT | Performed by: INTERNAL MEDICINE

## 2018-04-17 PROCEDURE — 36415 COLL VENOUS BLD VENIPUNCTURE: CPT | Performed by: INTERNAL MEDICINE

## 2018-04-17 PROCEDURE — G0463 HOSPITAL OUTPT CLINIC VISIT: HCPCS

## 2018-04-17 PROCEDURE — 99213 OFFICE O/P EST LOW 20 MIN: CPT | Performed by: INTERNAL MEDICINE

## 2018-04-17 PROCEDURE — 80061 LIPID PANEL: CPT | Performed by: INTERNAL MEDICINE

## 2018-04-17 ASSESSMENT — ENCOUNTER SYMPTOMS
ENDOCRINE NEGATIVE: 1
EYES NEGATIVE: 1
ALLERGIC/IMMUNOLOGIC NEGATIVE: 1
CONSTITUTIONAL NEGATIVE: 1

## 2018-04-17 NOTE — PROGRESS NOTES
Chief Complaint:  F/U on HTN and cholesterol.    HPI: He is here for follow-up on his blood pressure.  We increased his lisinopril to 40 mg daily.  He is tolerating this.  His blood pressures at home are actually running a little bit lower than we are getting here today so that is excellent.  He is tolerating his lipid-lowering therapy and has no other complaints or concerns.  All of these issues arose when he was found to have some diabetic retinopathy.  We have been aggressively treating his lipids, his blood pressure and have put him on an aspirin.  His last A1c was 2 months ago which was acceptable at 7.0.    Past Medical History:   Diagnosis Date     Abscess of back 03/23/2017     Diabetes mellitus, type 2 (H)      Hyperlipidemia      Hypertension        Past Surgical History:   Procedure Laterality Date     EXAM UNDER ANESTHESIA, CHANGE DRESSING (LOCATION), COMBINED N/A 3/24/2017    Procedure: COMBINED EXAM UNDER ANESTHESIA, CHANGE DRESSING (LOCATION);  Surgeon: Tahir De Jesus DO;  Location: [facility]     EXCISE LESION BACK N/A 3/22/2017    Procedure: EXCISE LESION BACK;  Surgeon: Servando Dale MD;  Location: [facility]     IRRIGATION AND DEBRIDEMENT TRUNK, COMBINED N/A 3/23/2017    Procedure: COMBINED IRRIGATION AND DEBRIDEMENT TRUNK;  Surgeon: Servando Dale MD;  Location: [facility]       No Known Allergies    Current Outpatient Prescriptions   Medication Sig Dispense Refill     lisinopril (PRINIVIL/ZESTRIL) 40 MG tablet Take 1 tablet (40 mg) by mouth daily 30 tablet 0     atorvastatin (LIPITOR) 40 MG tablet Take 1 tablet (40 mg) by mouth daily 90 tablet 3     glipiZIDE (GLUCOTROL) 5 MG tablet Take 1.5 tablets (7.5 mg) by mouth 2 times daily (before meals) 30 tablet      metFORMIN (GLUCOPHAGE-XR) 500 MG 24 hr tablet Take 4 tablets (2,000 mg) by mouth daily 30 tablet      aspirin 81 MG EC tablet Take 1 tablet (81 mg) by mouth daily 90 tablet 3       Review of Systems   Constitutional: Negative.    Eyes:  Negative.    Endocrine: Negative.    Allergic/Immunologic: Negative.        Physical Exam   Constitutional: He appears well-developed and well-nourished. No distress.   Skin: He is not diaphoretic.   Nursing note and vitals reviewed.    Recheck /84    Assessment:        ICD-10-CM    1. Benign essential hypertension I10 Comprehensive metabolic panel (BMP + Alb, Alk Phos, ALT, AST, Total. Bili, TP)   2. Hyperlipidemia LDL goal <100 E78.5 Lipid Profile (Chol, Trig, HDL, LDL calc) - FASTING       Plan: His blood pressure is now well controlled.  He is tolerating his other medications without problems.  He will follow-up on his diabetic retinopathy with the [facility].  Lab pending, I will send a letter with the results.  If all goes well, recheck diabetes and A1c in August.  Encouraged healthy eating, exercise and weight loss.

## 2018-04-17 NOTE — MR AVS SNAPSHOT
After Visit Summary   4/17/2018    Trevor Alexandre    MRN: [id number]           Patient Information     Date Of Birth          1945        Visit Information        Provider Department      4/17/2018 2:00 PM Landen Gavin MD [facility]        Today's Diagnoses     Benign essential hypertension    -  1    Hyperlipidemia LDL goal <100           Follow-ups after your visit        Who to contact     If you have questions or need follow up information about today's clinic visit or your schedule please contact [facility] directly at [phone].  Normal or non-critical lab and imaging results will be communicated to you by MongoDBhart, letter or phone within 4 business days after the clinic has received the results. If you do not hear from us within 7 days, please contact the clinic through Huy [place] or phone. If you have a critical or abnormal lab result, we will notify you by phone as soon as possible.  Submit refill requests through Huy [place] or call your pharmacy and they will forward the refill request to us. Please allow 3 business days for your refill to be completed.          Additional Information About Your Visit        MyChart Information     Huy [place] gives you secure access to your electronic health record. If you see a primary care provider, you can also send messages to your care team and make appointments. If you have questions, please call your primary care clinic.  If you do not have a primary care provider, please call [phone] and they will assist you.        Care EveryWhere ID     This is your Care EveryWhere ID. This could be used by other organizations to access your [place] medical records  [id number]        Your Vitals Were     Pulse BMI (Body Mass Index)                68 33.3 kg/m2           Blood Pressure from Last 3 Encounters:   04/17/18 136/88   03/06/18 (!) 148/92   02/12/18 (!) 154/98    Weight from Last 3 Encounters:   04/17/18  219 lb (99.3 kg)   03/06/18 220 lb (99.8 kg)   02/12/18 220 lb (99.8 kg)              We Performed the Following     Comprehensive metabolic panel (BMP + Alb, Alk Phos, ALT, AST, Total. Bili, TP)     Lipid Profile (Chol, Trig, HDL, LDL calc) - FASTING        Primary Care Provider Office Phone # Fax #    Landen Gavin [phone] [address] [facility] [address]        Equal Access to Services     [facility] : Hadii aad ku hadasho Soomaali, waaxda luqadaha, qaybta kaalmada adeegyada, waxkelsea farnsworth hayciscon lauren overton . So [facility] [phone].    ATENCIÓN: Si habla español, tiene a patrick disposición servicios gratuitos de asistencia lingüística. LlThe [facility] at [facility]woods [phone].    We comply with applicable federal civil rights laws and [place] laws. We do not discriminate on the basis of race, color, national origin, age, disability, sex, sexual orientation, or gender identity.            Thank you!     Thank you for choosing [facility] AND [facility]  for your care. Our goal is always to provide you with excellent care. Hearing back from our patients is one way we can continue to improve our services. Please take a few minutes to complete the written survey that you may receive in the mail after your visit with us. Thank you!             Your Updated Medication List - Protect others around you: Learn how to safely use, store and throw away your medicines at www.disposemymeds.org.          This list is accurate as of 4/17/18  2:23 PM.  Always use your most recent med list.                   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, without long-term current use of insulin, macular edema presence unspecified, unspecified retinopathy  severity (H)       glipiZIDE 5 MG tablet    GLUCOTROL    30 tablet    Take 1.5 tablets (7.5 mg) by mouth 2 times daily (before meals)        lisinopril 40 MG tablet    PRINIVIL/ZESTRIL    30 tablet    Take 1 tablet (40 mg) by mouth daily    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

## 2018-04-17 NOTE — NURSING NOTE
The patient is here today to have a six week follow up.  Hawa Brooks LPN on 4/17/2018 at 2:14 PM

## 2018-04-17 NOTE — LETTER
April 17, 2018      Trevor Alexandre  [address] [place] [address]        Dear Trevor Alexandre,    Below are the results of your recent labs:    Results for orders placed or performed in visit on 04/17/18   Comprehensive metabolic panel (BMP + Alb, Alk Phos, ALT, AST, Total. Bili, TP)   Result Value Ref Range    Sodium 138 134 - 144 mmol/L    Potassium 3.9 3.5 - 5.1 mmol/L    Chloride 103 98 - 107 mmol/L    Carbon Dioxide 26 21 - 31 mmol/L    Anion Gap 9 3 - 14 mmol/L    Glucose 206 (H) 70 - 105 mg/dL    Urea Nitrogen 11 7 - 25 mg/dL    Creatinine 0.89 0.70 - 1.30 mg/dL    GFR Estimate 84 >60 mL/min/1.7m2    GFR Estimate If Black >90 >60 mL/min/1.7m2    Calcium 9.7 8.6 - 10.3 mg/dL    Bilirubin Total 0.9 0.3 - 1.0 mg/dL    Albumin 4.5 3.5 - 5.7 g/dL    Protein Total 7.6 6.4 - 8.9 g/dL    Alkaline Phosphatase 76 34 - 104 U/L    ALT 27 7 - 52 U/L    AST 20 13 - 39 U/L   Lipid Profile (Chol, Trig, HDL, LDL calc) - FASTING   Result Value Ref Range    Cholesterol 111 <200 mg/dL    Triglycerides 144 <150 mg/dL    HDL Cholesterol 34 23 - 92 mg/dL    LDL Cholesterol Calculated 48 <100 mg/dL    Non HDL Cholesterol 77 <130 mg/dL        Aside from your sugar being elevated, everything else looks great.  Congratulations on this report and keep up the good work.  Assuming all goes well, let us have you back in August for recheck on your diabetes.    Sincerely,        Landen Gavin MD  Internal Medicine  [facility]

## 2018-04-18 ASSESSMENT — PATIENT HEALTH QUESTIONNAIRE - PHQ9: SUM OF ALL RESPONSES TO PHQ QUESTIONS 1-9: 0

## 2018-04-25 ENCOUNTER — MYC REFILL (OUTPATIENT)
Dept: INTERNAL MEDICINE | Facility: OTHER | Age: 73
End: 2018-04-25

## 2018-04-25 DIAGNOSIS — E11.319 TYPE 2 DIABETES MELLITUS WITH RETINOPATHY OF LEFT EYE, WITHOUT LONG-TERM CURRENT USE OF INSULIN, MACULAR EDEMA PRESENCE UNSPECIFIED, UNSPECIFIED RETINOPATHY SEVERITY (H): ICD-10-CM

## 2018-04-26 RX ORDER — LISINOPRIL 40 MG/1
40 TABLET ORAL DAILY
Qty: 90 TABLET | Refills: 0 | Status: SHIPPED | OUTPATIENT
Start: 2018-04-26 | End: 2018-07-27

## 2018-04-26 NOTE — TELEPHONE ENCOUNTER
[facility]  Original authorizing provider: MD Trevor LOPEZ VIOLETMakayla Baldomero would like a refill of the following medications:  lisinopril (PRINIVIL/ZESTRIL) 40 MG tablet [NAYA CEDEÑO MD]    Preferred pharmacy: [facility] [address] [place] [address]    Comment:

## 2018-05-15 ENCOUNTER — TRANSFERRED RECORDS (OUTPATIENT)
Dept: HEALTH INFORMATION MANAGEMENT | Facility: OTHER | Age: 73
End: 2018-05-15

## 2018-07-27 ENCOUNTER — OFFICE VISIT (OUTPATIENT)
Dept: INTERNAL MEDICINE | Facility: OTHER | Age: 73
End: 2018-07-27
Attending: INTERNAL MEDICINE
Payer: MEDICARE

## 2018-07-27 VITALS
BODY MASS INDEX: 32.69 KG/M2 | DIASTOLIC BLOOD PRESSURE: 78 MMHG | SYSTOLIC BLOOD PRESSURE: 122 MMHG | WEIGHT: 215 LBS | HEART RATE: 76 BPM

## 2018-07-27 DIAGNOSIS — D23.5 BENIGN NEOPLASM OF SKIN OF TRUNK, EXCEPT SCROTUM: ICD-10-CM

## 2018-07-27 DIAGNOSIS — E11.319 TYPE 2 DIABETES MELLITUS WITH RETINOPATHY OF LEFT EYE, WITHOUT LONG-TERM CURRENT USE OF INSULIN, MACULAR EDEMA PRESENCE UNSPECIFIED, UNSPECIFIED RETINOPATHY SEVERITY (H): Primary | ICD-10-CM

## 2018-07-27 LAB — HBA1C MFR BLD: 6.8 % (ref 4–6)

## 2018-07-27 PROCEDURE — G0463 HOSPITAL OUTPT CLINIC VISIT: HCPCS | Mod: 25

## 2018-07-27 PROCEDURE — 83036 HEMOGLOBIN GLYCOSYLATED A1C: CPT | Performed by: INTERNAL MEDICINE

## 2018-07-27 PROCEDURE — 99213 OFFICE O/P EST LOW 20 MIN: CPT | Mod: 25 | Performed by: INTERNAL MEDICINE

## 2018-07-27 PROCEDURE — 17110 DESTRUCTION B9 LES UP TO 14: CPT | Performed by: INTERNAL MEDICINE

## 2018-07-27 PROCEDURE — 36415 COLL VENOUS BLD VENIPUNCTURE: CPT | Performed by: INTERNAL MEDICINE

## 2018-07-27 PROCEDURE — G0463 HOSPITAL OUTPT CLINIC VISIT: HCPCS

## 2018-07-27 RX ORDER — LISINOPRIL 40 MG/1
40 TABLET ORAL DAILY
Qty: 90 TABLET | Refills: 3 | Status: SHIPPED | OUTPATIENT
Start: 2018-07-27 | End: 2019-04-01

## 2018-07-27 ASSESSMENT — ENCOUNTER SYMPTOMS
ALLERGIC/IMMUNOLOGIC NEGATIVE: 1
EYES NEGATIVE: 1
ENDOCRINE NEGATIVE: 1
CONSTITUTIONAL NEGATIVE: 1

## 2018-07-27 NOTE — NURSING NOTE
The patient is here today to get a refill on some medication and have a spot on his right leg looked at.  Hawa Brooks LPN on 7/27/2018 at 2:44 PM

## 2018-07-27 NOTE — PROGRESS NOTES
Chief Complaint: Recheck on diabetes and check spots on skin.    HPI: This patient comes in today for recheck on his diabetes.  He is on 2 oral agents for control of his diabetes.  The last time we did an A1c it was reasonable at 7.0.  He does try to follow somewhat of a diet.  The metformin cause some diarrhea but that has actually improved with the use of some Metamucil.  He needs a refill on his lisinopril.    He did go to a retinal specialist and apparently his diabetic retinopathy is not too severe.    Patient has some skin lesions that he would like checked.    Medications reconciled.  Past medical history, past surgical history, family history and social history reviewed and updated.    Past Medical History:   Diagnosis Date     Abscess of back 03/23/2017     Diabetes mellitus, type 2 (H)      Diabetic retinopathy (H)      Hyperlipidemia      Hypertension        Past Surgical History:   Procedure Laterality Date     EXAM UNDER ANESTHESIA, CHANGE DRESSING (LOCATION), COMBINED N/A 3/24/2017    Procedure: COMBINED EXAM UNDER ANESTHESIA, CHANGE DRESSING (LOCATION);  Surgeon: Tahir De Jesus DO;  Location: [facility]     EXCISE LESION BACK N/A 3/22/2017    Procedure: EXCISE LESION BACK;  Surgeon: Servando Dale MD;  Location: [facility]     IRRIGATION AND DEBRIDEMENT TRUNK, COMBINED N/A 3/23/2017    Procedure: COMBINED IRRIGATION AND DEBRIDEMENT TRUNK;  Surgeon: Servando Dale MD;  Location: [facility]       No Known Allergies    Current Outpatient Prescriptions   Medication Sig Dispense Refill     aspirin 81 MG EC tablet Take 1 tablet (81 mg) by mouth daily 90 tablet 3     atorvastatin (LIPITOR) 40 MG tablet Take 1 tablet (40 mg) by mouth daily 90 tablet 3     glipiZIDE (GLUCOTROL) 5 MG tablet Take 1.5 tablets (7.5 mg) by mouth 2 times daily (before meals) 30 tablet      lisinopril (PRINIVIL/ZESTRIL) 40 MG tablet Take 1 tablet (40 mg) by mouth daily 90 tablet 3     metFORMIN (GLUCOPHAGE-XR) 500 MG 24 hr tablet Take 4  tablets (2,000 mg) by mouth daily 30 tablet      [DISCONTINUED] lisinopril (PRINIVIL/ZESTRIL) 40 MG tablet Take 1 tablet (40 mg) by mouth daily 90 tablet 0       Review of Systems   Constitutional: Negative.    Eyes: Negative.    Endocrine: Negative.    Allergic/Immunologic: Negative.        Physical Exam   Constitutional: He appears well-developed and well-nourished. No distress.   Skin: He is not diaphoretic.   He had 2 very small lesions on his ears that were consistent with seborrheic keratoses.  He had a 12 mm x 12 mm lesion on his right lateral lower leg consistent with seborrheic keratoses.  These areas were treated with liquid nitrogen.   Nursing note and vitals reviewed.      Assessment:        ICD-10-CM    1. 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 Benign neoplasm of skin of trunk, except scrotum D23.5 DESTRUCT BENIGN LESION, UP TO 14       Plan: The skin lesions should resolve without any incident, if not he can follow-up on these as needed.  Medications are reconciled and refilled as necessary.  A1c drawn and pending, I will send him a letter with the results and recommendation for follow-up.  If his A1c is acceptable, I will likely just recommend a physical in 6 months.

## 2018-07-27 NOTE — LETTER
August 5, 2018      Trevor Alexandre  [address] [address] [facility] [address]        Dear Trevor Alexandre,    Below are the results of your recent labs:    Results for orders placed or performed in visit on 07/27/18   Hemoglobin A1c   Result Value Ref Range    Hemoglobin A1C 6.8 (H) 4.0 - 6.0 %        Your diabetes test looks excellent.  Congratulations on this report.  Assuming all goes well, I would recommend a physical in 6 months.    Sincerely,        Landen Gavin MD  Internal Medicine  [facility]

## 2018-07-27 NOTE — MR AVS SNAPSHOT
After Visit Summary   7/27/2018    Trevor Alexandre    MRN: [id number]           Patient Information     Date Of Birth          1945        Visit Information        Provider Department      7/27/2018 3:00 PM Landen Gavin MD [facility]        Today's Diagnoses     Type 2 diabetes mellitus with retinopathy of left eye, without long-term current use of insulin, macular edema presence unspecified, unspecified retinopathy severity (H)    -  1    Benign neoplasm of skin of trunk, except scrotum           Follow-ups after your visit        Who to contact     If you have questions or need follow up information about today's clinic visit or your schedule please contact [facility] directly at [phone].  Normal or non-critical lab and imaging results will be communicated to you by [employer], letter or phone within 4 business days after the clinic has received the results. If you do not hear from us within 7 days, please contact the clinic through [employer] or phone. If you have a critical or abnormal lab result, we will notify you by phone as soon as possible.  Submit refill requests through [employer] or call your pharmacy and they will forward the refill request to us. Please allow 3 business days for your refill to be completed.          Additional Information About Your Visit        MyChart Information     [employer] gives you secure access to your electronic health record. If you see a primary care provider, you can also send messages to your care team and make appointments. If you have questions, please call your primary care clinic.  If you do not have a primary care provider, please call [phone] and they will assist you.        Care EveryWhere ID     This is your Care EveryWhere ID. This could be used by other organizations to access your [facility] medical records  [id number]        Your Vitals Were     Pulse BMI (Body Mass Index)                76 32.69  kg/m2           Blood Pressure from Last 3 Encounters:   07/27/18 122/78   04/17/18 136/88   03/06/18 (!) 148/92    Weight from Last 3 Encounters:   07/27/18 215 lb (97.5 kg)   04/17/18 219 lb (99.3 kg)   03/06/18 220 lb (99.8 kg)              We Performed the Following     DESTRUCT BENIGN LESION, UP TO 14     Hemoglobin A1c          Where to get your medicines      These medications were sent to [facility] [address] [address]     Phone:  [phone]     lisinopril 40 MG tablet          Primary Care Provider Office Phone # Fax #    Landen Gavin [phone]       [address] [facility] [address]        Equal Access to Services     [facility]: Hadii refugio onofre Solillian, waaxda luqadaha, qaybta kaalmathiago beckett, alyssa overton . So [facility] [phone].    ATENCIÓN: Si habla español, tiene a patrick disposición servicios gratuitos de asistencia lingüística. Kallie al [phone].    We comply with applicable federal civil rights laws and [place] laws. We do not discriminate on the basis of race, color, national origin, age, disability, sex, sexual orientation, or gender identity.            Thank you!     Thank you for choosing [facility] AND [facility]  for your care. Our goal is always to provide you with excellent care. Hearing back from our patients is one way we can continue to improve our services. Please take a few minutes to complete the written survey that you may receive in the mail after your visit with us. Thank you!             Your Updated Medication List - Protect others around you: Learn how to safely use, store and throw away your medicines at www.disposemymeds.org.          This list is accurate as of 7/27/18  3:03 PM.  Always use your most recent med list.                   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, without long-term current use of insulin, macular edema presence unspecified, unspecified retinopathy severity (H)       glipiZIDE 5 MG tablet    GLUCOTROL    30 tablet    Take 1.5 tablets (7.5 mg) by mouth 2 times daily (before meals)        lisinopril 40 MG tablet    PRINIVIL/ZESTRIL    90 tablet    Take 1 tablet (40 mg) by mouth daily    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

## 2018-07-28 ASSESSMENT — PATIENT HEALTH QUESTIONNAIRE - PHQ9: SUM OF ALL RESPONSES TO PHQ QUESTIONS 1-9: 0

## 2018-10-01 ENCOUNTER — ALLIED HEALTH/NURSE VISIT (OUTPATIENT)
Dept: FAMILY MEDICINE | Facility: OTHER | Age: 73
End: 2018-10-01
Attending: INTERNAL MEDICINE
Payer: MEDICARE

## 2018-10-01 DIAGNOSIS — Z23 NEED FOR IMMUNIZATION AGAINST INFLUENZA: Primary | ICD-10-CM

## 2018-10-01 PROCEDURE — G0008 ADMIN INFLUENZA VIRUS VAC: HCPCS

## 2018-10-01 PROCEDURE — 90662 IIV NO PRSV INCREASED AG IM: CPT

## 2018-10-01 NOTE — MR AVS SNAPSHOT
After Visit Summary   10/1/2018    Trevor Alexandre    MRN: [id number]           Patient Information     Date Of Birth          1945        Visit Information        Provider Department      10/1/2018 12:45 PM [facility]U [facility]        Today's Diagnoses     Need for immunization against influenza    -  1       Follow-ups after your visit        Who to contact     If you have questions or need follow up information about today's clinic visit or your schedule please contact [facility] directly at [phone].  Normal or non-critical lab and imaging results will be communicated to you by [employer], letter or phone within 4 business days after the clinic has received the results. If you do not hear from us within 7 days, please contact the clinic through [employer] or phone. If you have a critical or abnormal lab result, we will notify you by phone as soon as possible.  Submit refill requests through [employer] or call your pharmacy and they will forward the refill request to us. Please allow 3 business days for your refill to be completed.          Additional Information About Your Visit        MyChart Information     [employer] gives you secure access to your electronic health record. If you see a primary care provider, you can also send messages to your care team and make appointments. If you have questions, please call your primary care clinic.  If you do not have a primary care provider, please call [phone] and they will assist you.        Care EveryWhere ID     This is your Care EveryWhere ID. This could be used by other organizations to access your [facility] medical records  [id number]         Blood Pressure from Last 3 Encounters:   07/27/18 122/78   04/17/18 136/88   03/06/18 (!) 148/92    Weight from Last 3 Encounters:   07/27/18 215 lb (97.5 kg)   04/17/18 219 lb (99.3 kg)   03/06/18 220 lb (99.8 kg)              We Performed the Following     GH IMM-   FLU VACCINE, INCREASED ANTIGEN, PRESV FREE        Primary Care Provider Office Phone # Fax #    Landen Gavin [phone] [address]   [facility] [address]        Equal Access to Services     [facility]ENTER[facility] : Hadii refugio duke ruelo Sorobbali, waaxda luqadaha, qaybta kaalmada adelivia, alyssa kellyin hayaavimal aguilar rosamayte ashton. So [facility] [phone].    ATENCIÓN: Si habla español, tiene a patrick disposición servicios gratuitos de asistencia lingüística. Llame al [phone].    We comply with applicable federal civil rights laws and [place] laws. We do not discriminate on the basis of race, color, national origin, age, disability, sex, sexual orientation, or gender identity.            Thank you!     Thank you for choosing [facility] AND hospitals  for your care. Our goal is always to provide you with excellent care. Hearing back from our patients is one way we can continue to improve our services. Please take a few minutes to complete the written survey that you may receive in the mail after your visit with us. Thank you!             Your Updated Medication List - Protect others around you: Learn how to safely use, store and throw away your medicines at www.disposemymeds.org.          This list is accurate as of 10/1/18 11:59 PM.  Always use your most recent med list.                   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, without long-term current use of insulin, macular edema presence unspecified, unspecified retinopathy severity (H)       glipiZIDE 5 MG tablet    GLUCOTROL    30 tablet    Take 1.5 tablets (7.5 mg) by mouth 2 times daily (before meals)        lisinopril 40 MG tablet    PRINIVIL/ZESTRIL    90 tablet    Take 1 tablet  (40 mg) by mouth daily    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

## 2018-10-01 NOTE — PROGRESS NOTES
Injectable Influenza Immunization Documentation    1.  Is the person to be vaccinated sick today?   No    2. Does the person to be vaccinated have an allergy to a component   of the vaccine?   No  Egg Allergy Algorithm Link    3. Has the person to be vaccinated ever had a serious reaction   to influenza vaccine in the past?   No    4. Has the person to be vaccinated ever had Guillain-Barré syndrome?   No    Form completed by Christy Castelan LPN 10/1/2018   12:51 PM    Prior to injection verified patient identity using patient's name and date of birth.  Due to injection administration, patient instructed to remain in clinic for 15 minutes  afterwards, and to report any adverse reaction to me immediately.

## 2018-10-12 ENCOUNTER — TELEPHONE (OUTPATIENT)
Dept: INTERNAL MEDICINE | Facility: OTHER | Age: 73
End: 2018-10-12

## 2018-10-12 DIAGNOSIS — E11.319 TYPE 2 DIABETES MELLITUS WITH RETINOPATHY OF LEFT EYE, WITHOUT LONG-TERM CURRENT USE OF INSULIN, MACULAR EDEMA PRESENCE UNSPECIFIED, UNSPECIFIED RETINOPATHY SEVERITY (H): Primary | ICD-10-CM

## 2018-10-12 RX ORDER — GLIPIZIDE 5 MG/1
7.5 TABLET ORAL
Qty: 270 TABLET | Refills: 3 | Status: SHIPPED | OUTPATIENT
Start: 2018-10-12 | End: 2018-12-28

## 2018-10-12 RX ORDER — GLIPIZIDE 5 MG/1
7.5 TABLET ORAL
Qty: 266 TABLET | Refills: 3 | Status: CANCELLED | OUTPATIENT
Start: 2018-10-12

## 2018-10-12 NOTE — TELEPHONE ENCOUNTER
Called patient and let him know that the rx was sent to [facility].  That was the correct pharmacy.  Alayna Galvan LPN .......10/12/2018 11:43 AM

## 2018-10-12 NOTE — TELEPHONE ENCOUNTER
The patient called and stated [facility] but would [facility] to switch to Landen Gavin MD and have him order them. He only has enough left for tomorrow and would like a prescription sent in to his pharmacy. Dosage was verified and teed up below.  Hawa Brooks LPN on 10/12/2018 at 11:29 AM

## 2018-12-28 ENCOUNTER — MYC REFILL (OUTPATIENT)
Dept: INTERNAL MEDICINE | Facility: OTHER | Age: 73
End: 2018-12-28

## 2018-12-28 DIAGNOSIS — E11.319 TYPE 2 DIABETES MELLITUS WITH RETINOPATHY OF LEFT EYE, WITHOUT LONG-TERM CURRENT USE OF INSULIN, MACULAR EDEMA PRESENCE UNSPECIFIED, UNSPECIFIED RETINOPATHY SEVERITY (H): Primary | ICD-10-CM

## 2018-12-31 RX ORDER — METFORMIN HCL 500 MG
2000 TABLET, EXTENDED RELEASE 24 HR ORAL DAILY
Qty: 30 TABLET | Refills: 0 | Status: SHIPPED | OUTPATIENT
Start: 2018-12-31 | End: 2019-01-02

## 2018-12-31 RX ORDER — GLIPIZIDE 5 MG/1
7.5 TABLET ORAL
Qty: 270 TABLET | Refills: 3 | Status: SHIPPED | OUTPATIENT
Start: 2018-12-31 | End: 2019-01-02

## 2019-01-02 ENCOUNTER — TELEPHONE (OUTPATIENT)
Dept: INTERNAL MEDICINE | Facility: OTHER | Age: 74
End: 2019-01-02

## 2019-01-02 DIAGNOSIS — E11.319 TYPE 2 DIABETES MELLITUS WITH RETINOPATHY OF LEFT EYE, WITHOUT LONG-TERM CURRENT USE OF INSULIN, MACULAR EDEMA PRESENCE UNSPECIFIED, UNSPECIFIED RETINOPATHY SEVERITY (H): Primary | ICD-10-CM

## 2019-01-02 RX ORDER — GLIPIZIDE 5 MG/1
7.5 TABLET ORAL
Qty: 270 TABLET | Refills: 3 | Status: SHIPPED | OUTPATIENT
Start: 2019-01-02 | End: 2019-05-20

## 2019-01-02 RX ORDER — METFORMIN HCL 500 MG
2000 TABLET, EXTENDED RELEASE 24 HR ORAL DAILY
Qty: 30 TABLET | Refills: 0 | Status: SHIPPED | OUTPATIENT
Start: 2019-01-02 | End: 2019-01-04

## 2019-01-02 NOTE — TELEPHONE ENCOUNTER
Pt needs prescription for Metformin and Glipizide sent to [facility].  [facility].

## 2019-01-04 DIAGNOSIS — E11.319 TYPE 2 DIABETES MELLITUS WITH RETINOPATHY OF LEFT EYE, WITHOUT LONG-TERM CURRENT USE OF INSULIN, MACULAR EDEMA PRESENCE UNSPECIFIED, UNSPECIFIED RETINOPATHY SEVERITY (H): ICD-10-CM

## 2019-01-04 RX ORDER — METFORMIN HCL 500 MG
2000 TABLET, EXTENDED RELEASE 24 HR ORAL DAILY
Qty: 360 TABLET | Refills: 3 | Status: SHIPPED | OUTPATIENT
Start: 2019-01-04 | End: 2019-04-01

## 2019-03-01 ENCOUNTER — OFFICE VISIT (OUTPATIENT)
Dept: INTERNAL MEDICINE | Facility: OTHER | Age: 74
End: 2019-03-01
Attending: INTERNAL MEDICINE
Payer: MEDICARE

## 2019-03-01 VITALS
TEMPERATURE: 96.5 F | WEIGHT: 215.78 LBS | DIASTOLIC BLOOD PRESSURE: 82 MMHG | OXYGEN SATURATION: 99 % | HEART RATE: 82 BPM | RESPIRATION RATE: 16 BRPM | SYSTOLIC BLOOD PRESSURE: 138 MMHG | BODY MASS INDEX: 32.81 KG/M2

## 2019-03-01 DIAGNOSIS — M19.90 INFLAMMATORY ARTHRITIS: ICD-10-CM

## 2019-03-01 DIAGNOSIS — E78.5 HYPERLIPIDEMIA LDL GOAL <100: Primary | ICD-10-CM

## 2019-03-01 DIAGNOSIS — E11.319 TYPE 2 DIABETES MELLITUS WITH RETINOPATHY OF LEFT EYE, WITHOUT LONG-TERM CURRENT USE OF INSULIN, MACULAR EDEMA PRESENCE UNSPECIFIED, UNSPECIFIED RETINOPATHY SEVERITY (H): ICD-10-CM

## 2019-03-01 PROCEDURE — G0463 HOSPITAL OUTPT CLINIC VISIT: HCPCS

## 2019-03-01 PROCEDURE — 99213 OFFICE O/P EST LOW 20 MIN: CPT | Performed by: INTERNAL MEDICINE

## 2019-03-01 RX ORDER — ATORVASTATIN CALCIUM 40 MG/1
40 TABLET, FILM COATED ORAL DAILY
Qty: 90 TABLET | Refills: 3 | Status: SHIPPED | OUTPATIENT
Start: 2019-03-01 | End: 2020-03-06

## 2019-03-01 RX ORDER — PREDNISONE 10 MG/1
TABLET ORAL
Qty: 42 TABLET | Refills: 0 | Status: SHIPPED | OUTPATIENT
Start: 2019-03-01 | End: 2019-04-01

## 2019-03-01 ASSESSMENT — PAIN SCALES - GENERAL: PAINLEVEL: MODERATE PAIN (5)

## 2019-03-01 ASSESSMENT — ENCOUNTER SYMPTOMS
CONSTITUTIONAL NEGATIVE: 1
ALLERGIC/IMMUNOLOGIC NEGATIVE: 1
ENDOCRINE NEGATIVE: 1
EYES NEGATIVE: 1

## 2019-03-01 NOTE — NURSING NOTE
Chief Complaint   Patient presents with     Arthritis     C/o pain in both hands/fingers, L shoulder and what is keeping him up at noc is the pain in his R knee and L hip. Looking to get relief so he can sleep at noc. No relief from Ibuprofen or ice.     Medication Reconciliation: complete    Cynthia De La Garza, LPN

## 2019-03-01 NOTE — PROGRESS NOTES
Chief Complaint: Arthritis pain.    HPI: He comes in today with a complaint of arthritis pain.  This has been bothering him off and on for years.  He has had numerous evaluations in the past without obvious diagnosis.  He tells me that he is been diagnosed with reactive arthritis.  He has been on anti-inflammatories as well as narcotics in the past.  This winter is been especially bad for his arthritis so he is in today to have this reviewed.  He does actually have swollen joints from it.    Past Medical History:   Diagnosis Date     Abscess of back 03/23/2017     Diabetes mellitus, type 2 (H)      Diabetic retinopathy (H)      Hyperlipidemia      Hypertension      Inflammatory arthritis        Past Surgical History:   Procedure Laterality Date     EXAM UNDER ANESTHESIA, CHANGE DRESSING (LOCATION), COMBINED N/A 3/24/2017    Procedure: COMBINED EXAM UNDER ANESTHESIA, CHANGE DRESSING (LOCATION);  Surgeon: Tahir De Jesus DO;  Location: [facility]     EXCISE LESION BACK N/A 3/22/2017    Procedure: EXCISE LESION BACK;  Surgeon: Servando Dale MD;  Location: [facility]     IRRIGATION AND DEBRIDEMENT TRUNK, COMBINED N/A 3/23/2017    Procedure: COMBINED IRRIGATION AND DEBRIDEMENT TRUNK;  Surgeon: Servando Dale MD;  Location: [facility]       No Known Allergies    Current Outpatient Medications   Medication Sig Dispense Refill     aspirin 81 MG EC tablet Take 1 tablet (81 mg) by mouth daily 90 tablet 3     atorvastatin (LIPITOR) 40 MG tablet Take 1 tablet (40 mg) by mouth daily 90 tablet 3     glipiZIDE (GLUCOTROL) 5 MG tablet Take 1.5 tablets (7.5 mg) by mouth 2 times daily (before meals) 270 tablet 3     lisinopril (PRINIVIL/ZESTRIL) 40 MG tablet Take 1 tablet (40 mg) by mouth daily 90 tablet 3     metFORMIN (GLUCOPHAGE-XR) 500 MG 24 hr tablet Take 4 tablets (2,000 mg) by mouth daily 360 tablet 3     predniSONE (DELTASONE) 10 MG tablet Take 30 mg by mouth daily for 7 days, THEN 20 mg daily for 7 days, THEN 10 mg daily for 7  days. 42 tablet 0       Review of Systems   Constitutional: Negative.    Eyes: Negative.    Endocrine: Negative.    Allergic/Immunologic: Negative.        Physical Exam   Constitutional: He appears well-developed and well-nourished. No distress.   Musculoskeletal:   He has swelling of his right third and fourth fingers   Skin: He is not diaphoretic.   Nursing note and vitals reviewed.      Assessment:        ICD-10-CM    1. Hyperlipidemia LDL goal <100 E78.5 atorvastatin (LIPITOR) 40 MG tablet   2. 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 Inflammatory arthritis M19.90 predniSONE (DELTASONE) 10 MG tablet       Plan: Reviewed the situation with the patient.  I am not really interested in committing him to long-term treatment with methotrexate, etc. at this time if it can be avoided.  I elected to put him on prednisone therapy, somewhat low dose, and warned him about the possibility of his sugar increasing with the prednisone.  Refills were otherwise done as needed.  Recheck with me in 1 month for a complete evaluation and review on his symptoms.

## 2019-04-01 ENCOUNTER — OFFICE VISIT (OUTPATIENT)
Dept: INTERNAL MEDICINE | Facility: OTHER | Age: 74
End: 2019-04-01
Attending: INTERNAL MEDICINE
Payer: MEDICARE

## 2019-04-01 VITALS
TEMPERATURE: 96.7 F | HEART RATE: 68 BPM | BODY MASS INDEX: 31.07 KG/M2 | SYSTOLIC BLOOD PRESSURE: 134 MMHG | DIASTOLIC BLOOD PRESSURE: 86 MMHG | RESPIRATION RATE: 16 BRPM | HEIGHT: 70 IN | WEIGHT: 217 LBS

## 2019-04-01 DIAGNOSIS — E11.319 TYPE 2 DIABETES MELLITUS WITH RETINOPATHY OF LEFT EYE, WITHOUT LONG-TERM CURRENT USE OF INSULIN, MACULAR EDEMA PRESENCE UNSPECIFIED, UNSPECIFIED RETINOPATHY SEVERITY (H): Primary | ICD-10-CM

## 2019-04-01 DIAGNOSIS — I10 BENIGN ESSENTIAL HYPERTENSION: ICD-10-CM

## 2019-04-01 DIAGNOSIS — M19.90 INFLAMMATORY ARTHRITIS: ICD-10-CM

## 2019-04-01 DIAGNOSIS — E78.5 HYPERLIPIDEMIA LDL GOAL <100: ICD-10-CM

## 2019-04-01 DIAGNOSIS — N40.0 BENIGN LOCALIZED PROSTATIC HYPERPLASIA WITHOUT LOWER URINARY TRACT SYMPTOMS (LUTS): ICD-10-CM

## 2019-04-01 DIAGNOSIS — Z86.0101 HX OF ADENOMATOUS COLONIC POLYPS: ICD-10-CM

## 2019-04-01 LAB
ALBUMIN SERPL-MCNC: 4.6 G/DL (ref 3.5–5.7)
ALP SERPL-CCNC: 73 U/L (ref 34–104)
ALT SERPL W P-5'-P-CCNC: 29 U/L (ref 7–52)
ANION GAP SERPL CALCULATED.3IONS-SCNC: 9 MMOL/L (ref 3–14)
AST SERPL W P-5'-P-CCNC: 22 U/L (ref 13–39)
BILIRUB SERPL-MCNC: 1.1 MG/DL (ref 0.3–1)
BUN SERPL-MCNC: 8 MG/DL (ref 7–25)
CALCIUM SERPL-MCNC: 9.9 MG/DL (ref 8.6–10.3)
CHLORIDE SERPL-SCNC: 101 MMOL/L (ref 98–107)
CHOLEST SERPL-MCNC: 123 MG/DL
CO2 SERPL-SCNC: 27 MMOL/L (ref 21–31)
CREAT SERPL-MCNC: 0.85 MG/DL (ref 0.7–1.3)
GFR SERPL CREATININE-BSD FRML MDRD: 88 ML/MIN/{1.73_M2}
GLUCOSE SERPL-MCNC: 158 MG/DL (ref 70–105)
HBA1C MFR BLD: 8.3 % (ref 4–6)
HDLC SERPL-MCNC: 42 MG/DL (ref 23–92)
LDLC SERPL CALC-MCNC: 59 MG/DL
NONHDLC SERPL-MCNC: 81 MG/DL
POTASSIUM SERPL-SCNC: 3.8 MMOL/L (ref 3.5–5.1)
PROT SERPL-MCNC: 7.9 G/DL (ref 6.4–8.9)
PSA SERPL-MCNC: 2.4 NG/ML
SODIUM SERPL-SCNC: 137 MMOL/L (ref 134–144)
TRIGL SERPL-MCNC: 109 MG/DL

## 2019-04-01 PROCEDURE — 83036 HEMOGLOBIN GLYCOSYLATED A1C: CPT | Performed by: INTERNAL MEDICINE

## 2019-04-01 PROCEDURE — 84153 ASSAY OF PSA TOTAL: CPT | Performed by: INTERNAL MEDICINE

## 2019-04-01 PROCEDURE — 80053 COMPREHEN METABOLIC PANEL: CPT | Performed by: INTERNAL MEDICINE

## 2019-04-01 PROCEDURE — 36415 COLL VENOUS BLD VENIPUNCTURE: CPT | Performed by: INTERNAL MEDICINE

## 2019-04-01 PROCEDURE — G0463 HOSPITAL OUTPT CLINIC VISIT: HCPCS

## 2019-04-01 PROCEDURE — 99215 OFFICE O/P EST HI 40 MIN: CPT | Performed by: INTERNAL MEDICINE

## 2019-04-01 PROCEDURE — 80061 LIPID PANEL: CPT | Performed by: INTERNAL MEDICINE

## 2019-04-01 RX ORDER — METFORMIN HCL 500 MG
2000 TABLET, EXTENDED RELEASE 24 HR ORAL DAILY
Qty: 360 TABLET | Refills: 3 | Status: SHIPPED | OUTPATIENT
Start: 2019-04-01 | End: 2019-10-07

## 2019-04-01 RX ORDER — LISINOPRIL 40 MG/1
40 TABLET ORAL DAILY
Qty: 90 TABLET | Refills: 3 | Status: SHIPPED | OUTPATIENT
Start: 2019-04-01 | End: 2020-07-24

## 2019-04-01 ASSESSMENT — ENCOUNTER SYMPTOMS
CONFUSION: 0
JOINT SWELLING: 0
ADENOPATHY: 0
BRUISES/BLEEDS EASILY: 0
DIFFICULTY URINATING: 0
SPEECH DIFFICULTY: 0
WHEEZING: 0
VOMITING: 0
NERVOUS/ANXIOUS: 0
WEAKNESS: 0
FREQUENCY: 0
TROUBLE SWALLOWING: 0
ABDOMINAL PAIN: 0
DIARRHEA: 0
SLEEP DISTURBANCE: 0
LIGHT-HEADEDNESS: 0
AGITATION: 0
SEIZURES: 0
HEMATURIA: 0
RHINORRHEA: 0
WOUND: 0
SORE THROAT: 0
CHILLS: 0
FLANK PAIN: 0
EYE REDNESS: 0
HEADACHES: 0
NUMBNESS: 0
EYE PAIN: 0
PALPITATIONS: 0
NAUSEA: 0
ARTHRALGIAS: 1
ABDOMINAL DISTENTION: 0
CONSTIPATION: 0
TREMORS: 0
DIZZINESS: 0
HALLUCINATIONS: 0
UNEXPECTED WEIGHT CHANGE: 0
BLOOD IN STOOL: 0
FATIGUE: 0
NECK PAIN: 0
DYSURIA: 0
BACK PAIN: 0
CHEST TIGHTNESS: 0
FEVER: 0
DIAPHORESIS: 0
VOICE CHANGE: 0
SHORTNESS OF BREATH: 0
SINUS PRESSURE: 0
SINUS PAIN: 0
COUGH: 0
NECK STIFFNESS: 0
APPETITE CHANGE: 0

## 2019-04-01 ASSESSMENT — PATIENT HEALTH QUESTIONNAIRE - PHQ9: SUM OF ALL RESPONSES TO PHQ QUESTIONS 1-9: 0

## 2019-04-01 ASSESSMENT — MIFFLIN-ST. JEOR: SCORE: 1731.59

## 2019-04-01 NOTE — NURSING NOTE
"The patient is here today to be seen for medication refills  Hawa Brooks LPN on 4/1/2019 at 1:23 PM  Chief Complaint   Patient presents with     Recheck Medication       Initial /86 (BP Location: Right arm, Patient Position: Sitting, Cuff Size: Adult Regular)   Pulse 68   Temp 96.7  F (35.9  C) (Tympanic)   Resp 16   Ht 1.772 m (5' 9.75\")   Wt 98.4 kg (217 lb)   BMI 31.36 kg/m   Estimated body mass index is 31.36 kg/m  as calculated from the following:    Height as of this encounter: 1.772 m (5' 9.75\").    Weight as of this encounter: 98.4 kg (217 lb).  Medication Reconciliation: complete    Hawa Brooks LPN    "

## 2019-04-01 NOTE — PROGRESS NOTES
Chief Complaint:  This patient is here for a comprehensive review of their multiple medical problems, renewal of medications and update on necessary health maintenance issues.      HPI: He comes in today for complete evaluation and review of his chronic medical problems.  He has a history of type 2 diabetes mellitus.  He is on 2 drug therapy for control of this.  Historically, his diabetic control has really been quite good.  We recently had him on some prednisone and even with that he did not have any significant worsening in his control.  He does not have any side effects or complications as a result of his diabetes.  He did have some diabetic retinopathy initially but that is no longer troublesome for him.    This patient also has hyperlipidemia.  He is on moderate intensity statin therapy and tolerates this well.  He does not have any known vascular disease as a result of his hyperlipidemia.  The patient also has hypertension and is on ACE inhibitor therapy.  He has good control of his blood pressure with this and without any endorgan disease.    He has a history of inflammatory arthritis, etiology uncertain.  He has had numerous evaluations in the past.  The last time he was in we elected to just treat him with prednisone therapy.  He tells me that the prednisone worked beautifully and he is now off of it and he feels great.    Medications are reconciled.  Past medical history, past surgical history, family history and social histories are all reviewed and updated.  He believes his [facility] are up[facility]to[facility]date[facility]  He had a colonoscopy last in December 2014 and it was recommended that he have a 3-year follow-up due to the number and size of the polyps found.    Past Medical History:   Diagnosis Date     Abscess of back 03/23/2017     Diabetes mellitus, type 2 (H)      Diabetic retinopathy (H)      Hx of adenomatous colonic polyps      Hyperlipidemia      Hypertension      Inflammatory  arthritis        Past Surgical History:   Procedure Laterality Date     ARTHROSCOPY SHOULDER RT/LT Bilateral      CHOLECYSTECTOMY       COLONOSCOPY  2014    Adenomatous polyps     EXAM UNDER ANESTHESIA, CHANGE DRESSING (LOCATION), COMBINED N/A 3/24/2017    Procedure: COMBINED EXAM UNDER ANESTHESIA, CHANGE DRESSING (LOCATION);  Surgeon: Tahir De Jesus DO;  [facility]: [facility]     EXCISE LESION BACK N/A 3/22/2017    Procedure: EXCISE LESION BACK;  Surgeon: Servando Dale MD;  Location: [facility]     FINGER SURGERY Right     Middle     IRRIGATION AND DEBRIDEMENT TRUNK, COMBINED N/A 3/23/2017    Procedure: COMBINED IRRIGATION AND DEBRIDEMENT TRUNK;  Surgeon: Servando Dale MD;  Location: [facility]       Current Outpatient Medications   Medication Sig Dispense Refill     aspirin 81 MG EC tablet Take 1 tablet (81 mg) by mouth daily 90 tablet 3     atorvastatin (LIPITOR) 40 MG tablet Take 1 tablet (40 mg) by mouth daily 90 tablet 3     glipiZIDE (GLUCOTROL) 5 MG tablet Take 1.5 tablets (7.5 mg) by mouth 2 times daily (before meals) 270 tablet 3     lisinopril (PRINIVIL/ZESTRIL) 40 MG tablet Take 1 tablet (40 mg) by mouth daily 90 tablet 3     metFORMIN (GLUCOPHAGE-XR) 500 MG 24 hr tablet Take 4 tablets (2,000 mg) by mouth daily 360 tablet 3       No Known Allergies    Family History   Problem Relation Age of Onset     Heart Disease Mother         MI     Alzheimer Disease Father      Alzheimer Disease Brother      Other - See Comments Brother         CNS aneurysm       Social History     Socioeconomic History     Marital status:      Spouse name: Not on file     Number of children: Not on file     Years of education: Not on file     Highest education level: Not on file   Occupational History     Not on file   Social Needs     Financial resource strain: Not on file     Food insecurity:     Worry: Not on file     Inability: Not on file     Transportation needs:     Medical: Not on file     Non-medical: Not on file    Tobacco Use     Smoking status: Former Smoker     Types: Cigarettes     Last attempt to quit: 3/27/2002     Years since quittin.0     Smokeless tobacco: Never Used   Substance and Sexual Activity     Alcohol use: No     Drug use: No     Sexual activity: Not on file   Lifestyle     Physical activity:     Days per week: Not on file     Minutes per session: Not on file     Stress: Not on file   Relationships     Social connections:     Talks on phone: Not on file     Gets together: Not on file     Attends [employer] service: Not on file     Active member of club or organization: Not on file     Attends meetings of clubs or organizations: Not on file     Relationship status: Not on file     Intimate partner violence:     Fear of current or ex partner: Not on file     Emotionally abused: Not on file     Physically abused: Not on file     Forced sexual activity: Not on file   Other Topics Concern     Parent/sibling w/ CABG, MI or angioplasty before 65F 55M? Not Asked   Social History Narrative    Lives in [place].   twice.  Retired from [employer].       Review of Systems   Constitutional: Negative for appetite change, chills, diaphoresis, fatigue, fever and unexpected weight change.   HENT: Negative for ear pain, rhinorrhea, sinus pressure, sinus pain, sore throat, trouble swallowing and voice change.    Eyes: Negative for pain, redness and visual disturbance.   Respiratory: Negative for cough, chest tightness, shortness of breath and wheezing.    Cardiovascular: Negative for chest pain, palpitations and leg swelling.   Gastrointestinal: Negative for abdominal distention, abdominal pain, blood in stool, constipation, diarrhea, nausea and vomiting.   Endocrine: Negative for cold intolerance and heat intolerance.   Genitourinary: Negative for difficulty urinating, dysuria, flank pain, frequency and hematuria.   Musculoskeletal: Positive for arthralgias. Negative for back pain, joint swelling, neck pain and  neck stiffness.   Skin: Negative for rash and wound.   Allergic/Immunologic: Negative for immunocompromised state.   Neurological: Negative for dizziness, tremors, seizures, syncope, speech difficulty, weakness, light-headedness, numbness and headaches.   Hematological: Negative for adenopathy. Does not bruise/bleed easily.   Psychiatric/Behavioral: Negative for agitation, behavioral problems, confusion, hallucinations and sleep disturbance. The patient is not nervous/anxious.        Physical Exam   Constitutional: He is oriented to person, place, and time. He appears well-developed and well-nourished. No distress.   HENT:   Head: Normocephalic.   Right Ear: External ear normal.   Left Ear: External ear normal.   Nose: Nose normal.   Mouth/Throat: Oropharynx is clear and moist. No oropharyngeal exudate.   Eyes: Conjunctivae are normal. Pupils are equal, round, and reactive to light.   Neck: Normal range of motion. Neck supple. Normal carotid pulses and no JVD present. Carotid bruit is not present. No tracheal deviation present. No thyromegaly present.   Cardiovascular: Normal rate, regular rhythm and normal heart sounds. Exam reveals no gallop and no friction rub.   No murmur heard.  Pulmonary/Chest: Effort normal and breath sounds normal. No stridor. No respiratory distress. He has no wheezes. He has no rales.   Abdominal: Soft. Bowel sounds are normal. He exhibits no distension and no mass. There is no tenderness. There is no rebound and no guarding. No hernia.   Genitourinary: Rectum normal and penis normal. Prostate is enlarged.   Genitourinary Comments: 3+ prostate, no nodularity   Musculoskeletal: Normal range of motion. He exhibits no edema.   Lymphadenopathy:     He has no cervical adenopathy.   Neurological: He is alert and oriented to person, place, and time. He displays normal reflexes. No cranial nerve deficit or sensory deficit. He exhibits normal muscle tone. Coordination normal.   Skin: Skin is warm  and dry. No rash noted. He is not diaphoretic.   Psychiatric: He has a normal mood and affect.   Nursing note and vitals reviewed.      Assessment:      ICD-10-CM    1. 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 Hyperlipidemia LDL goal <100 E78.5    3. Benign essential hypertension I10    4. Inflammatory arthritis M19.90    5. Hx of adenomatous colonic polyps Z86.010 GASTROENTEROLOGY ADULT REF PROCEDURE ONLY   6. Benign localized prostatic hyperplasia without lower urinary tract symptoms (LUTS) N40.0 Prostate Specific Antigen GH        Plan: Overall he appears to be doing well.  In regards to his inflammatory arthritis, he is basically devoid of symptoms at this point in time.  Nothing further will be done, hopefully he will not have a relapse anytime soon.  If he does he will need to let me know and we will determine treatment for more long-term control depending on the frequency of exacerbations.  Medications will continue without change, refills were done as necessary per patient.  Of note is that [facility] as [facility] of his aorta so that does not need to be ordered despite what the best practice advisory states.  Complete lab drawn and pending, I will send him a letter with the results and recommendation for change as well as follow-up.    Finally, he is overdue for colonoscopy.  Order is placed and this is requested.

## 2019-04-01 NOTE — LETTER
April 1, 2019      Trevor Alexandre  [id number] [place] [address]        Dear Trevor Alexandre,    Below are the results of your recent labs:    Results for orders placed or performed in visit on 04/01/19   Prostate Specific Antigen GH   Result Value Ref Range    Prostate Specific Antigen 2.399 <3.100 ng/mL   Comprehensive Metabolic Panel   Result Value Ref Range    Sodium 137 134 - 144 mmol/L    Potassium 3.8 3.5 - 5.1 mmol/L    Chloride 101 98 - 107 mmol/L    Carbon Dioxide 27 21 - 31 mmol/L    Anion Gap 9 3 - 14 mmol/L    Glucose 158 (H) 70 - 105 mg/dL    Urea Nitrogen 8 7 - 25 mg/dL    Creatinine 0.85 0.70 - 1.30 mg/dL    GFR Estimate 88 >60 mL/min/[1.73_m2]    GFR Estimate If Black >90 >60 mL/min/[1.73_m2]    Calcium 9.9 8.6 - 10.3 mg/dL    Bilirubin Total 1.1 (H) 0.3 - 1.0 mg/dL    Albumin 4.6 3.5 - 5.7 g/dL    Protein Total 7.9 6.4 - 8.9 g/dL    Alkaline Phosphatase 73 34 - 104 U/L    ALT 29 7 - 52 U/L    AST 22 13 - 39 U/L   Lipid Panel   Result Value Ref Range    Cholesterol 123 <200 mg/dL    Triglycerides 109 <150 mg/dL    HDL Cholesterol 42 23 - 92 mg/dL    LDL Cholesterol Calculated 59 <100 mg/dL    Non HDL Cholesterol 81 <130 mg/dL   Hemoglobin A1c   Result Value Ref Range    Hemoglobin A1C 8.3 (H) 4.0 - 6.0 %        Your diabetic control is not very good.  I want you to increase your glipizide from 1-1/2 tablets twice daily up to 2 tablets twice daily.  I want you to work very hard on a healthy diet as well as exercise and some weight loss.  Let us plan to recheck you again in 3 months.  All of the rest of your blood tests look acceptable.    Sincerely,        Landen Gavin MD  Internal Medicine  [facility]

## 2019-04-03 DIAGNOSIS — Z12.11 SPECIAL SCREENING FOR MALIGNANT NEOPLASMS, COLON: Primary | ICD-10-CM

## 2019-04-03 RX ORDER — BISACODYL 5 MG
TABLET, DELAYED RELEASE (ENTERIC COATED) ORAL
Qty: 2 TABLET | Refills: 0 | Status: ON HOLD | OUTPATIENT
Start: 2019-04-03 | End: 2019-05-08

## 2019-04-03 RX ORDER — POLYETHYLENE GLYCOL 3350, SODIUM CHLORIDE, SODIUM BICARBONATE, POTASSIUM CHLORIDE 420; 11.2; 5.72; 1.48 G/4L; G/4L; G/4L; G/4L
4000 POWDER, FOR SOLUTION ORAL ONCE
Qty: 4000 ML | Refills: 0 | Status: ON HOLD | OUTPATIENT
Start: 2019-04-03 | End: 2019-05-08

## 2019-04-03 NOTE — TELEPHONE ENCOUNTER
Screening Questions for the Scheduling of Screening Colonoscopies   (If Colonoscopy is diagnostic, Provider should review the chart before scheduling.)  Are you younger than 50 or older than 80?  NO  Do you take aspirin or fish oil?  YES - ASA (if yes, tell patient to stop 1 week prior to Colonoscopy)  Do you take warfarin (Coumadin), clopidogrel (Plavix), apixaban (Eliquis), dabigatram (Pradaxa), rivaroxaban (Xarelto) or any blood thinner? NO  Do you use oxygen at home?  NO  Do you have kidney disease? NO  Are you on dialysis? NO  Have you had a stroke or heart attack in the last year? NO  Have you had a stent in your heart or any blood vessel in the last year? NO  Have you had a transplant of any organ? NO  Have you had a colonoscopy or upper endoscopy (EGD) before? YES         When?  [facility] -- 2014   Date of scheduled Colonoscopy. 5/8/2019  Provider CASTILLO  Pharmacy [facility]

## 2019-05-08 ENCOUNTER — HOSPITAL ENCOUNTER (OUTPATIENT)
Facility: OTHER | Age: 74
Discharge: HOME | End: 2019-05-08
Attending: SURGERY | Admitting: SURGERY
Payer: MEDICARE

## 2019-05-08 ENCOUNTER — ANESTHESIA (OUTPATIENT)
Dept: SURGERY | Facility: OTHER | Age: 74
End: 2019-05-08
Payer: MEDICARE

## 2019-05-08 ENCOUNTER — ANESTHESIA EVENT (OUTPATIENT)
Dept: SURGERY | Facility: OTHER | Age: 74
End: 2019-05-08
Payer: MEDICARE

## 2019-05-08 VITALS
HEART RATE: 79 BPM | RESPIRATION RATE: 16 BRPM | BODY MASS INDEX: 31.07 KG/M2 | TEMPERATURE: 96.8 F | DIASTOLIC BLOOD PRESSURE: 91 MMHG | OXYGEN SATURATION: 98 % | WEIGHT: 215 LBS | SYSTOLIC BLOOD PRESSURE: 116 MMHG

## 2019-05-08 DIAGNOSIS — K63.5 POLYP OF ASCENDING COLON, UNSPECIFIED TYPE: Primary | ICD-10-CM

## 2019-05-08 DIAGNOSIS — K57.30 DIVERTICULOSIS OF COLON WITHOUT DIVERTICULITIS: ICD-10-CM

## 2019-05-08 DIAGNOSIS — K64.8 INTERNAL HEMORRHOID, BLEEDING: ICD-10-CM

## 2019-05-08 PROCEDURE — 45385 COLONOSCOPY W/LESION REMOVAL: CPT | Performed by: NURSE ANESTHETIST, CERTIFIED REGISTERED

## 2019-05-08 PROCEDURE — 40000010 ZZH STATISTIC ANES STAT CODE-CRNA PER MINUTE: Performed by: SURGERY

## 2019-05-08 PROCEDURE — 88305 TISSUE EXAM BY PATHOLOGIST: CPT

## 2019-05-08 PROCEDURE — 25000125 ZZHC RX 250: Performed by: NURSE ANESTHETIST, CERTIFIED REGISTERED

## 2019-05-08 PROCEDURE — 25000125 ZZHC RX 250: Performed by: SURGERY

## 2019-05-08 PROCEDURE — 45380 COLONOSCOPY AND BIOPSY: CPT | Performed by: SURGERY

## 2019-05-08 PROCEDURE — 45385 COLONOSCOPY W/LESION REMOVAL: CPT | Mod: PT | Performed by: SURGERY

## 2019-05-08 PROCEDURE — 25000128 H RX IP 250 OP 636: Performed by: NURSE ANESTHETIST, CERTIFIED REGISTERED

## 2019-05-08 PROCEDURE — 25800030 ZZH RX IP 258 OP 636: Performed by: SURGERY

## 2019-05-08 PROCEDURE — 25800030 ZZH RX IP 258 OP 636: Performed by: NURSE ANESTHETIST, CERTIFIED REGISTERED

## 2019-05-08 PROCEDURE — 46221 LIGATION OF HEMORRHOID(S): CPT | Performed by: SURGERY

## 2019-05-08 PROCEDURE — 99100 ANES PT EXTEME AGE<1 YR&>70: CPT | Performed by: NURSE ANESTHETIST, CERTIFIED REGISTERED

## 2019-05-08 PROCEDURE — 46221 LIGATION OF HEMORRHOID(S): CPT | Mod: 51 | Performed by: SURGERY

## 2019-05-08 RX ORDER — LIDOCAINE HYDROCHLORIDE 20 MG/ML
INJECTION, SOLUTION INFILTRATION; PERINEURAL PRN
Status: DISCONTINUED | OUTPATIENT
Start: 2019-05-08 | End: 2019-05-08

## 2019-05-08 RX ORDER — PROPOFOL 10 MG/ML
INJECTION, EMULSION INTRAVENOUS PRN
Status: DISCONTINUED | OUTPATIENT
Start: 2019-05-08 | End: 2019-05-08

## 2019-05-08 RX ORDER — LIDOCAINE 40 MG/G
CREAM TOPICAL
Status: DISCONTINUED | OUTPATIENT
Start: 2019-05-08 | End: 2019-05-08 | Stop reason: HOSPADM

## 2019-05-08 RX ORDER — ONDANSETRON 2 MG/ML
4 INJECTION INTRAMUSCULAR; INTRAVENOUS
Status: DISCONTINUED | OUTPATIENT
Start: 2019-05-08 | End: 2019-05-08 | Stop reason: HOSPADM

## 2019-05-08 RX ORDER — PROPOFOL 10 MG/ML
INJECTION, EMULSION INTRAVENOUS CONTINUOUS PRN
Status: DISCONTINUED | OUTPATIENT
Start: 2019-05-08 | End: 2019-05-08

## 2019-05-08 RX ORDER — SODIUM CHLORIDE, SODIUM LACTATE, POTASSIUM CHLORIDE, CALCIUM CHLORIDE 600; 310; 30; 20 MG/100ML; MG/100ML; MG/100ML; MG/100ML
INJECTION, SOLUTION INTRAVENOUS CONTINUOUS
Status: DISCONTINUED | OUTPATIENT
Start: 2019-05-08 | End: 2019-05-08 | Stop reason: HOSPADM

## 2019-05-08 RX ADMIN — PHENYLEPHRINE HYDROCHLORIDE 100 MCG: 10 INJECTION, SOLUTION INTRAMUSCULAR; INTRAVENOUS; SUBCUTANEOUS at 09:20

## 2019-05-08 RX ADMIN — PHENYLEPHRINE HYDROCHLORIDE 150 MCG: 10 INJECTION, SOLUTION INTRAMUSCULAR; INTRAVENOUS; SUBCUTANEOUS at 09:25

## 2019-05-08 RX ADMIN — SODIUM CHLORIDE, POTASSIUM CHLORIDE, SODIUM LACTATE AND CALCIUM CHLORIDE: 600; 310; 30; 20 INJECTION, SOLUTION INTRAVENOUS at 08:16

## 2019-05-08 RX ADMIN — PROPOFOL 70 MG: 10 INJECTION, EMULSION INTRAVENOUS at 08:54

## 2019-05-08 RX ADMIN — PHENYLEPHRINE HYDROCHLORIDE 100 MCG: 10 INJECTION, SOLUTION INTRAMUSCULAR; INTRAVENOUS; SUBCUTANEOUS at 09:12

## 2019-05-08 RX ADMIN — PROPOFOL 140 MCG/KG/MIN: 10 INJECTION, EMULSION INTRAVENOUS at 08:49

## 2019-05-08 RX ADMIN — LIDOCAINE HYDROCHLORIDE 40 MG: 20 INJECTION, SOLUTION INFILTRATION; PERINEURAL at 08:52

## 2019-05-08 RX ADMIN — PHENYLEPHRINE HYDROCHLORIDE 100 MCG: 10 INJECTION, SOLUTION INTRAMUSCULAR; INTRAVENOUS; SUBCUTANEOUS at 09:16

## 2019-05-08 RX ADMIN — PHENYLEPHRINE HYDROCHLORIDE 200 MCG: 10 INJECTION, SOLUTION INTRAMUSCULAR; INTRAVENOUS; SUBCUTANEOUS at 09:30

## 2019-05-08 RX ADMIN — SODIUM CHLORIDE, POTASSIUM CHLORIDE, SODIUM LACTATE AND CALCIUM CHLORIDE: 600; 310; 30; 20 INJECTION, SOLUTION INTRAVENOUS at 08:46

## 2019-05-08 NOTE — DISCHARGE INSTRUCTIONS
Procedure you had done: colonoscopy with polyp removal and hemorrhoid banding  Your health care provider is:  Landen Gavin  Your surgeon is Dr. Velvet Lees.   Please call your health care provider or surgeon at [phone] if:    - you feel you are getting worse or having an increase in problems    - fever greater than 101 degrees  - increasing shortness of breath or chest pain  - any signs of infection (increasing redness, swelling, tenderness, warmth, change in appearance, or  increased drainage)  - blood in your urine or stool  - coughing or vomiting blood  - nausea (upset stomach) and vomiting and/or diarrhea that will not stop  - severe pain that is not relieved by medicine, rest or ice  You have had medications for sedation. Please be aware that this can cause drowsiness and impaired judgment for up to 24 hours after your procedure. Do not drive, operate power tools or drink alcohol for 24 hours.  If samples were taken-you will get a phone call and a letter with your results in the next 7-10 days. If you don't get results, please call and let us know!     [facility]  Adult Discharge Orders & Instructions    ________________________________________________________________          For 12 hours after surgery  1. Get plenty of rest.  A responsible adult must stay with you for at least 12 hours after you leave the hospital.   2. You may feel lightheaded.  IF so, sit for a few minutes before standing.  Have someone help you get up.   3. You may have a slight fever. Call the doctor if your fever is over 101 F (38.3 C) (taken under the tongue) or lasts longer than 24 hours.  4. You may have a dry mouth, a sore throat, muscle aches or trouble sleeping.  These should go away after 24 hours.  5. Do not make important or legal decisions.  6.   Do not drive or use heavy equipment.  If you have weakness or tingling, don't drive or use heavy equipment until this feeling goes away.    To contact a doctor,  call   [phone]_______________________

## 2019-05-08 NOTE — H&P
PRE-PROCEDURE NOTE    CHIEF COMPLAINT / REASON FORPROCEDURE:  Need for screening colonoscopy.    PERTINENT HISTORY   Patient with no complaints. Previous colonoscopy 2014 with adenomatous polyps. No diarrhea, constipation, abdominal pain or rectal bleeding. No family history of colon polyps or colon cancer.  Past Medical History:   Diagnosis Date     Abscess of back 03/23/2017     Diabetes mellitus, type 2 (H)      Diabetic retinopathy (H)      Hx of adenomatous colonic polyps      Hyperlipidemia      Hypertension      Inflammatory arthritis      Past Surgical History:   Procedure Laterality Date     ARTHROSCOPY SHOULDER RT/LT Bilateral      CHOLECYSTECTOMY       COLONOSCOPY  12/02/2014    Adenomatous polyps     EXAM UNDER ANESTHESIA, CHANGE DRESSING (LOCATION), COMBINED N/A 3/24/2017    Procedure: COMBINED EXAM UNDER ANESTHESIA, CHANGE DRESSING (LOCATION);  Surgeon: Tahir De Jesus DO;  [facility]: [facility]     EXCISE LESION BACK N/A 3/22/2017    Procedure: EXCISE LESION BACK;  Surgeon: Servando Dale MD;  Location: [facility]     FINGER SURGERY Right     Middle     IRRIGATION AND DEBRIDEMENT TRUNK, COMBINED N/A 3/23/2017    Procedure: COMBINED IRRIGATION AND DEBRIDEMENT TRUNK;  Surgeon: Servando Dale MD;  Location: [facility]     Other:  None  Bleeding tendencies:  No    Relevant Family History:  none    Relevant Social History:  none    A relevant review of systems was performed and was Negative.    ALLERGIES/SENSITIVITIES: No Known Allergies     CURRENTMEDICATIONS:      No current facility-administered medications on file prior to encounter.   Current Outpatient Medications on File Prior to Encounter:  aspirin 81 MG EC tablet Take 1 tablet (81 mg) by mouth daily   atorvastatin (LIPITOR) 40 MG tablet Take 1 tablet (40 mg) by mouth daily   glipiZIDE (GLUCOTROL) 5 MG tablet Take 1.5 tablets (7.5 mg) by mouth 2 times daily (before meals)   lisinopril (PRINIVIL/ZESTRIL) 40 MG tablet Take 1 tablet (40 mg) by mouth daily    metFORMIN (GLUCOPHAGE-XR) 500 MG 24 hr tablet Take 4 tablets (2,000 mg) by mouth daily     Current Facility-Administered Medications   Medication     lactated ringers infusion     lidocaine (LMX4) cream     lidocaine 1 % 0.1-1 mL     ondansetron (ZOFRAN) injection 4 mg     sodium chloride (PF) 0.9% PF flush 3 mL     sodium chloride (PF) 0.9% PF flush 3 mL       PRE-SEDATION ASSESSMENT:    BP (!) 153/95   Pulse 101   Temp 97.3  F (36.3  C) (Tympanic)   Resp 18   Wt 97.5 kg (215 lb)   SpO2 99%   BMI 31.07 kg/m    Lung Exam:  Normal  Heart Exam:  Normal    Comment(s):      IMPRESSION:  Need for screening colonoscopy.    PLAN:  I discussed screening colonoscopy with the patient. Anesthesia coverage requested due to age more than 70.

## 2019-05-08 NOTE — ANESTHESIA PREPROCEDURE EVALUATION
Anesthesia Pre-Procedure Evaluation    Patient: Trevor Alexandre   MRN: [id number] : 1945          Preoperative Diagnosis: screening    Procedure(s):  COLONOSCOPY    Past Medical History:   Diagnosis Date     Abscess of back 2017     Diabetes mellitus, type 2 (H)      Diabetic retinopathy (H)      Hx of adenomatous colonic polyps      Hyperlipidemia      Hypertension      Inflammatory arthritis      Past Surgical History:   Procedure Laterality Date     ARTHROSCOPY SHOULDER RT/LT Bilateral      CHOLECYSTECTOMY       COLONOSCOPY  2014    Adenomatous polyps     EXAM UNDER ANESTHESIA, CHANGE DRESSING (LOCATION), COMBINED N/A 3/24/2017    Procedure: COMBINED EXAM UNDER ANESTHESIA, CHANGE DRESSING (LOCATION);  Surgeon: Tahir De Jesus DO;  [facility]     EXCISE LESION BACK N/A 3/22/2017    Procedure: EXCISE LESION BACK;  Surgeon: Servando Dale MD;  [facility]: [facility]     FINGER SURGERY Right     Middle     IRRIGATION AND DEBRIDEMENT TRUNK, COMBINED N/A 3/23/2017    Procedure: COMBINED IRRIGATION AND DEBRIDEMENT TRUNK;  Surgeon: Servando Dale MD;  Location: [facility]       Anesthesia Evaluation     . Pt has had prior anesthetic.     No history of anesthetic complications          ROS/MED HX    ENT/Pulmonary:  - neg pulmonary ROS     Neurologic:  - neg neurologic ROS     Cardiovascular:     (+) Dyslipidemia, hypertension----. : . . . :. .       METS/Exercise Tolerance:     Hematologic:  - neg hematologic  ROS       Musculoskeletal:   (+) arthritis,  -       GI/Hepatic:     (+) bowel prep,       Renal/Genitourinary:  - ROS Renal section negative       Endo:     (+) type II DM .      Psychiatric:  - neg psychiatric ROS       Infectious Disease:  - neg infectious disease ROS       Malignancy:      - no malignancy   Other:    - neg other ROS                      Physical Exam  Normal systems: cardiovascular, pulmonary and dental    Airway   Mallampati: I  TM distance: >3 FB  Neck ROM:  "full    Dental     Cardiovascular   Rhythm and rate: regular and normal      Pulmonary    breath sounds clear to auscultation            Lab Results   Component Value Date    WBC 11.5 (H) 03/24/2017    HGB 8.9 (L) 03/24/2017    HCT 25.5 (L) 03/24/2017     03/24/2017    .0 (H) 03/22/2017    SED 44 (H) 03/22/2017     04/01/2019    POTASSIUM 3.8 04/01/2019    CHLORIDE 101 04/01/2019    CO2 27 04/01/2019    BUN 8 04/01/2019    CR 0.85 04/01/2019     (H) 04/01/2019    KASIA 9.9 04/01/2019    ALBUMIN 4.6 04/01/2019    PROTTOTAL 7.9 04/01/2019    ALT 29 04/01/2019    AST 22 04/01/2019    ALKPHOS 73 04/01/2019    BILITOTAL 1.1 (H) 04/01/2019       Preop Vitals  BP Readings from Last 3 Encounters:   05/08/19 (!) 153/95   04/01/19 134/86   03/01/19 138/82    Pulse Readings from Last 3 Encounters:   05/08/19 101   04/01/19 68   03/01/19 82      Resp Readings from Last 3 Encounters:   05/08/19 18   04/01/19 16   03/01/19 16    SpO2 Readings from Last 3 Encounters:   05/08/19 99%   03/01/19 99%   04/24/17 99%      Temp Readings from Last 1 Encounters:   05/08/19 97.3  F (36.3  C) (Tympanic)    Ht Readings from Last 1 Encounters:   04/01/19 1.772 m (5' 9.75\")      Wt Readings from Last 1 Encounters:   05/08/19 97.5 kg (215 lb)    Estimated body mass index is 31.07 kg/m  as calculated from the following:    Height as of 4/1/19: 1.772 m (5' 9.75\").    Weight as of this encounter: 97.5 kg (215 lb).       Anesthesia Plan      History & Physical Review      ASA Status:  2 .    NPO Status:  > 8 hours    Plan for MAC Reason for MAC:  Other - see comments (Age greater than 70)         Postoperative Care      Consents  Anesthetic plan, risks, benefits and alternatives discussed with:  Patient.  Use of blood products discussed: Yes.   Use of blood products discussed with Patient.  Consented to blood products.  .                 CHARIS Terry CRNA  "

## 2019-05-08 NOTE — OP NOTE
PROCEDURE NOTE    SURGEON: Velvet Wilson MD.    PRE-OP DIAGNOSIS:  Screening Colonoscopy      POST-OP DIAGNOSIS: colon polyp, diverticula sigmoid colon, bleeding internal hemorrhoid     Location: Cecum x2  Size: 0.8 and 0.3 cm  Removed:  Y    PROCEDURE:  Colonoscopy with polypectomy cold snare, banding of internal hemorrhoid x1    ESTIMATED BLOOD LOSS: minimal    COMPLICATIONS:  None    SPECIMEN:  Cecal polyps    ANESTHESIA:  See anesthesia note, anesthesia requested due to: age more than 70    INDICATION FOR THE PROCEDURE: The patient is a 73 year old male. The patient has no complaints. I explained to the patient the risks, benefits and alternatives to screening colonoscopy for evaluating the colon for colon polyps and colon cancer. We specifically discussed the risks of bleeding, infection, perforation, potential inability to reach the cecum and the risks of sedation. The patient's questions were answered and the patient wished to proceed. Informed consent paperwork was completed.    PROCEDURE: The patient was taken to the endoscopy suite. Appropriate monitors were attached. The patient was placed in the left lateral decubitus position.Timeout was performed confirming the patient's identity and procedure to be performed. After appropriate sedation was confirmed, digital rectal exam was performed. There was normal tone and no gross abnormality was noted other than internal hemorrhoids. The lubricated colonoscope was introduced into the anus the colon was insufflated with air. The prep quality was adequate. Under direct visualization the scope was advanced to the cecum. The ileocecal valve was intubated and the terminal ileum inspected. No gross abnormality was noted. The scope was withdrawn back into the cecum. Two sessile polyps less than 1 cm were noted in the cecum and removed with cold snare. The mucosa of colon was inspected while withdrawing the scope. Diverticula were noted in the sigmoid colon. The scope  was retroflexed in the rectum and the anorectal junction was inspected. A bleeding internal hemorrhoid was noted. The scope was returned to a neutral position and the colon was decompressed. The scope was removed. The anoscope was inserted exposing the bleeding hemorrhoid. A single band was placed. Hemostasis was excellent. The patient tolerated the procedure with no immediately apparent complication. The patient was taken to recovery in stable condition.  FOLLOW UP:  RECOMMEND high fiber diet, follow up: will call with pathology results.

## 2019-05-08 NOTE — ANESTHESIA POSTPROCEDURE EVALUATION
Patient: Trevor Alexandre    Procedure(s):  COLONOSCOPY, WITH POLYPECTOMY AND BIOPSY  Hemorrhoid banding    Diagnosis:screening  Diagnosis Additional Information: No value filed.    Anesthesia Type:  MAC    Note:  Anesthesia Post Evaluation    Patient location during evaluation: Phase 2  Patient participation: Able to fully participate in evaluation  Level of consciousness: awake and alert  Pain management: adequate  Airway patency: patent  Cardiovascular status: blood pressure returned to baseline, acceptable and hemodynamically stable  Respiratory status: acceptable  Hydration status: acceptable  PONV: none     Anesthetic complications: None          Last vitals:  Vitals:    05/08/19 0802 05/08/19 0937   BP: (!) 153/95 112/58   Pulse: 101 74   Resp: 18 16   Temp: 97.3  F (36.3  C) 96.8  F (36  C)   SpO2: 99%          Electronically Signed By: CHARIS Carey CRNA  May 8, 2019  10:10 AM

## 2019-05-20 ENCOUNTER — OFFICE VISIT (OUTPATIENT)
Dept: INTERNAL MEDICINE | Facility: OTHER | Age: 74
End: 2019-05-20
Attending: INTERNAL MEDICINE
Payer: MEDICARE

## 2019-05-20 VITALS
DIASTOLIC BLOOD PRESSURE: 84 MMHG | RESPIRATION RATE: 18 BRPM | WEIGHT: 210.4 LBS | SYSTOLIC BLOOD PRESSURE: 124 MMHG | BODY MASS INDEX: 30.41 KG/M2 | TEMPERATURE: 97.4 F | HEART RATE: 88 BPM

## 2019-05-20 DIAGNOSIS — E11.319 TYPE 2 DIABETES MELLITUS WITH RETINOPATHY OF LEFT EYE, WITHOUT LONG-TERM CURRENT USE OF INSULIN, MACULAR EDEMA PRESENCE UNSPECIFIED, UNSPECIFIED RETINOPATHY SEVERITY (H): Primary | ICD-10-CM

## 2019-05-20 DIAGNOSIS — Z86.14 HISTORY OF MRSA INFECTION: ICD-10-CM

## 2019-05-20 PROBLEM — L02.212 ABSCESS OF BACK: Status: RESOLVED | Noted: 2017-03-23 | Resolved: 2019-05-20

## 2019-05-20 PROCEDURE — 99213 OFFICE O/P EST LOW 20 MIN: CPT | Performed by: INTERNAL MEDICINE

## 2019-05-20 PROCEDURE — G0463 HOSPITAL OUTPT CLINIC VISIT: HCPCS

## 2019-05-20 PROCEDURE — 87081 CULTURE SCREEN ONLY: CPT | Mod: ZL | Performed by: INTERNAL MEDICINE

## 2019-05-20 RX ORDER — GLIPIZIDE 5 MG/1
10 TABLET ORAL
Qty: 360 TABLET | Refills: 3 | Status: SHIPPED | OUTPATIENT
Start: 2019-05-20 | End: 2020-06-03

## 2019-05-20 RX ORDER — GLIPIZIDE 5 MG/1
10 TABLET ORAL
Qty: 270 TABLET | Refills: 3 | COMMUNITY
Start: 2019-05-20 | End: 2019-05-20

## 2019-05-20 ASSESSMENT — ENCOUNTER SYMPTOMS
ENDOCRINE NEGATIVE: 1
EYES NEGATIVE: 1
ALLERGIC/IMMUNOLOGIC NEGATIVE: 1
CONSTITUTIONAL NEGATIVE: 1

## 2019-05-20 ASSESSMENT — PATIENT HEALTH QUESTIONNAIRE - PHQ9: SUM OF ALL RESPONSES TO PHQ QUESTIONS 1-9: 0

## 2019-05-20 NOTE — LETTER
May 20, 2019        Trevor Alexandre  [address] [facility] [address]        Dear Irving,    The nasal swab for MRSA had to be canceled due to a collection error.  We will just recheck this when you come back for your next diabetes test.  I apologize for the inconvenience.    Sincerely,        Landen Gavin MD  Internal Medicine  [facility]

## 2019-05-20 NOTE — NURSING NOTE
"The patient is here today to be seen for a follow up on a colonoscopy.  Hawa Brooks LPN on 5/20/2019 at 2:02 PM  Chief Complaint   Patient presents with     RECHECK       Initial /84 (BP Location: Right arm, Patient Position: Sitting, Cuff Size: Adult Regular)   Pulse 88   Temp 97.4  F (36.3  C) (Tympanic)   Resp 18   Wt 95.4 kg (210 lb 6.4 oz)   BMI 30.41 kg/m   Estimated body mass index is 30.41 kg/m  as calculated from the following:    Height as of 4/1/19: 1.772 m (5' 9.75\").    Weight as of this encounter: 95.4 kg (210 lb 6.4 oz).  Medication Reconciliation: complete    Hawa Brooks LPN    "

## 2019-05-20 NOTE — PROGRESS NOTES
Chief Complaint: Follow-up after colonoscopy.    HPI: He is here for follow-up after his colonoscopy.  He had some tubular adenomas removed and will need another in 3 years.  He is feeling well after the colonoscopy.  It was noted that he had a history of MRSA 2 years ago when he had a back infection and that is still on his chart.  It was recommended that he have a culture done to see if he is still carrier of MRSA.    The patient also did increase his diabetic medication as recommended.  He will need to have another A1c done in approximately 6 to 8 weeks.  He notes that his sugars are coming down.    Past Medical History:   Diagnosis Date     Abscess of back 03/23/2017     Diabetes mellitus, type 2 (H)      Diabetic retinopathy (H)      Hx of adenomatous colonic polyps      Hyperlipidemia      Hypertension      Inflammatory arthritis        Past Surgical History:   Procedure Laterality Date     ARTHROSCOPY SHOULDER RT/LT Bilateral      CHOLECYSTECTOMY       COLONOSCOPY  12/02/2014    Adenomatous polyps     COLONOSCOPY N/A 5/8/2019    adenomas, follow up in 2022     EXAM UNDER ANESTHESIA, CHANGE DRESSING (LOCATION), COMBINED N/A 3/24/2017    Procedure: COMBINED EXAM UNDER ANESTHESIA, CHANGE DRESSING (LOCATION);  Surgeon: Tahir De Jesus DO;  Location: [facility]     EXCISE LESION BACK N/A 3/22/2017    Procedure: EXCISE LESION BACK;  Surgeon: Servando Dale MD;  Location: [facility]     FINGER SURGERY Right     Middle     HEMORRHOIDECTOMY BANDING N/A 5/8/2019    Procedure: Hemorrhoid banding;  Surgeon: Velvet Lees MD;  Location: [facility]     IRRIGATION AND DEBRIDEMENT TRUNK, COMBINED N/A 3/23/2017    Procedure: COMBINED IRRIGATION AND DEBRIDEMENT TRUNK;  Surgeon: Servando Dale MD;  Location: [facility]       No Known Allergies    Current Outpatient Medications   Medication Sig Dispense Refill     aspirin 81 MG EC tablet Take 1 tablet (81 mg) by mouth daily 90 tablet 3     atorvastatin (LIPITOR) 40 MG tablet Take 1 tablet  (40 mg) by mouth daily 90 tablet 3     glipiZIDE (GLUCOTROL) 5 MG tablet Take 2 tablets (10 mg) by mouth 2 times daily (before meals) 360 tablet 3     lisinopril (PRINIVIL/ZESTRIL) 40 MG tablet Take 1 tablet (40 mg) by mouth daily 90 tablet 3     metFORMIN (GLUCOPHAGE-XR) 500 MG 24 hr tablet Take 4 tablets (2,000 mg) by mouth daily 360 tablet 3       Review of Systems   Constitutional: Negative.    Eyes: Negative.    Endocrine: Negative.    Allergic/Immunologic: Negative.        Physical Exam   Constitutional: He appears well-developed and well-nourished. No distress.   Skin: He is not diaphoretic.   Nursing note and vitals reviewed.      Assessment:        ICD-10-CM    1. 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 History of MRSA infection Z86.14 MRSA Culture       Plan: Culture was obtained for MRSA, I will let him know the results.  We will remove the notation from his record.  Refill for his glipizide was sent to his pharmacy.  Next follow-up will be on his diabetes.    His swab for MRSA had to be canceled as the collection was obtained on the wrong swab (laboratory staff gave me the wrong information).  We will just recheck this when he returns for his diabetic check as he has already left.

## 2019-05-23 LAB
BACTERIA SPEC CULT: NORMAL
SPECIMEN SOURCE: NORMAL

## 2019-09-30 ENCOUNTER — NURSE TRIAGE (OUTPATIENT)
Dept: INTERNAL MEDICINE | Facility: OTHER | Age: 74
End: 2019-09-30

## 2019-09-30 NOTE — TELEPHONE ENCOUNTER
"  S - Moderate diarrhea     B - Feels metformin has always caused some diarrhea. Worse in last 5 days after having a pulled pork sandwich episodes increased.     A - Having diarrhea episodes at least 6 times daily, worse in AM. Yellow liquid consistency. Cramps before episode, \"runs to toilet\".  Sees whole metformin pills in diarrhea. Blood sugars have increased - was around 100-150 in AM, now running 250's. Due for A1C check. Started using immodium, slightly helpful. Not sure if there has been stools with any form recently. No blood in stool, no fever, no ABD pain currently.     R - Patient wanted to see PCP to discuss alternative to Metformin, since it isn't absorbing properly.  Wants A1C check, had contacted us to see about possibility of a work-in with PCP. Christine Terrell RN on 9/30/2019 at 8:48 AM          Message per appointment request note:   I'm  having abnormal diarrhea that's lasted for 5 days all day long and my type 2 diabetes meds 4 Metformin 24 hour ER 500MG tabs have been showing up in the diarrhea stools 6 hours after taking them in the morning so my Glucose numbers are higher than normal.     ________________________________________      Answer Assessment - Initial Assessment Questions  1. DIARRHEA SEVERITY: \"How bad is the diarrhea?\" \"How many extra stools have you had in the past 24 hours than normal?\"     - MILD: Few loose or mushy BMs; increase of 1-3 stools over normal daily number of stools; mild increase in ostomy output.    - MODERATE: Increase of 4-6 stools daily over normal; moderate increase in ostomy output.    - SEVERE (or Worst Possible): Increase of 7 or more stools daily over normal; moderate increase in ostomy output; incontinence.      Moderate diarrhea  2. ONSET: \"When did the diarrhea begin?\"       Eversince metformin it started, now worse  3. BM CONSISTENCY: \"How loose or watery is the diarrhea?\"       Yellow watery  4. VOMITING: \"Are you also vomiting?\" If so, ask: \"How " "many times in the past 24 hours?\"       No  5. ABDOMINAL PAIN: \"Are you having any abdominal pain?\" If yes: \"What does it feel like?\" (e.g., crampy, dull, intermittent, constant)       Feels cramping and \"runs to the toilet\".    6. ABDOMINAL PAIN SEVERITY: If present, ask: \"How bad is the pain?\"  (e.g., Scale 1-10; mild, moderate, or severe)     - MILD (1-3): doesn't interfere with normal activities, abdomen soft and not tender to touch      - MODERATE (4-7): interferes with normal activities or awakens from sleep, tender to touch      - SEVERE (8-10): excruciating pain, doubled over, unable to do any normal activities        Only feels gassiness  7. ORAL INTAKE: If vomiting, \"Have you been able to drink liquids?\" \"How much fluids have you had in the past 24 hours?\"      Drinking enough water  8. HYDRATION: \"Any signs of dehydration?\" (e.g., dry mouth [not just dry lips], too weak to stand, dizziness, new weight loss) \"When did you last urinate?\"      No  9. EXPOSURE: \"Have you traveled to a foreign country recently?\" \"Have you been exposed to anyone with diarrhea?\" \"Could you have eaten any food that was spoiled?\"      No  10. ANTIBIOTIC USE: \"Are you taking antibiotics now or have you taken antibiotics in the past 2 months?\"        Yes for infection  11. OTHER SYMPTOMS: \"Do you have any other symptoms?\" (e.g., fever, blood in stool)        No  12. PREGNANCY: \"Is there any chance you are pregnant?\" \"When was your last menstrual period?\"        no    Protocols used: DIARRHEA-A-OH      "

## 2019-09-30 NOTE — TELEPHONE ENCOUNTER
Contacted the patient and gave him the information below. The patient was transferred to scheduling to make the appointment.  Hawa Brooks LPN on 9/30/2019 at 9:16 AM

## 2019-10-07 ENCOUNTER — OFFICE VISIT (OUTPATIENT)
Dept: INTERNAL MEDICINE | Facility: OTHER | Age: 74
End: 2019-10-07
Attending: INTERNAL MEDICINE
Payer: MEDICARE

## 2019-10-07 VITALS
BODY MASS INDEX: 30.78 KG/M2 | DIASTOLIC BLOOD PRESSURE: 88 MMHG | HEART RATE: 76 BPM | TEMPERATURE: 98.2 F | WEIGHT: 213 LBS | RESPIRATION RATE: 18 BRPM | SYSTOLIC BLOOD PRESSURE: 132 MMHG

## 2019-10-07 DIAGNOSIS — E11.319 TYPE 2 DIABETES MELLITUS WITH RETINOPATHY OF LEFT EYE, WITHOUT LONG-TERM CURRENT USE OF INSULIN, MACULAR EDEMA PRESENCE UNSPECIFIED, UNSPECIFIED RETINOPATHY SEVERITY (H): Primary | ICD-10-CM

## 2019-10-07 DIAGNOSIS — Z13.6 SCREENING FOR AAA (ABDOMINAL AORTIC ANEURYSM): ICD-10-CM

## 2019-10-07 PROCEDURE — G0463 HOSPITAL OUTPT CLINIC VISIT: HCPCS

## 2019-10-07 PROCEDURE — 99213 OFFICE O/P EST LOW 20 MIN: CPT | Performed by: INTERNAL MEDICINE

## 2019-10-07 PROCEDURE — G0463 HOSPITAL OUTPT CLINIC VISIT: HCPCS | Mod: 25

## 2019-10-07 PROCEDURE — 90686 IIV4 VACC NO PRSV 0.5 ML IM: CPT

## 2019-10-07 PROCEDURE — G0008 ADMIN INFLUENZA VIRUS VAC: HCPCS

## 2019-10-07 RX ORDER — METFORMIN HCL 500 MG
1000 TABLET, EXTENDED RELEASE 24 HR ORAL DAILY
Qty: 360 TABLET | Refills: 3 | COMMUNITY
Start: 2019-10-07 | End: 2020-06-24

## 2019-10-07 ASSESSMENT — ENCOUNTER SYMPTOMS
EYES NEGATIVE: 1
ALLERGIC/IMMUNOLOGIC NEGATIVE: 1
ENDOCRINE NEGATIVE: 1
CONSTITUTIONAL NEGATIVE: 1

## 2019-10-07 NOTE — PROGRESS NOTES
Chief Complaint: Diarrhea.    HPI: He is in with a complaint of diarrhea.  He has had this for the last month or so.  It is to the point where he can even really get outside of the home.  He is on metformin 2000 mg daily.  He is on the extended release because when he was on the short acting metformin he had significant diarrhea.  I told the patient this is most likely secondary to his metformin once again.  He had a recent colonoscopy that was unremarkable.  He is already on glipizide 10 mg twice daily in addition.  With his metformin at maximal dose and his glipizide at maximal dose, his last A1c was high at 8.3%.    Past Medical History:   Diagnosis Date     Abscess of back 03/23/2017     Diabetes mellitus, type 2 (H)      Diabetic retinopathy (H)      Hx of adenomatous colonic polyps      Hyperlipidemia      Hypertension      Inflammatory arthritis        Past Surgical History:   Procedure Laterality Date     ARTHROSCOPY SHOULDER RT/LT Bilateral      CHOLECYSTECTOMY       COLONOSCOPY  12/02/2014    Adenomatous polyps     COLONOSCOPY N/A 5/8/2019    adenomas, follow up in 2022     EXAM UNDER ANESTHESIA, CHANGE DRESSING (LOCATION), COMBINED N/A 3/24/2017    Procedure: COMBINED EXAM UNDER ANESTHESIA, CHANGE DRESSING (LOCATION);  Surgeon: Tahir De Jesus DO;  [facility]: [facility]     EXCISE LESION BACK N/A 3/22/2017    Procedure: EXCISE LESION BACK;  Surgeon: Servando Dale MD;  [facility]: [facility]     FINGER SURGERY Right     Middle     HEMORRHOIDECTOMY BANDING N/A 5/8/2019    Procedure: Hemorrhoid banding;  Surgeon: Velvet Lees MD;  Location: [facility]     IRRIGATION AND DEBRIDEMENT TRUNK, COMBINED N/A 3/23/2017    Procedure: COMBINED IRRIGATION AND DEBRIDEMENT TRUNK;  Surgeon: Servando Dale MD;  Location: [facility]       No Known Allergies    Current Outpatient Medications   Medication Sig Dispense Refill     aspirin 81 MG EC tablet Take 1 tablet (81 mg) by mouth daily 90 tablet 3     atorvastatin (LIPITOR) 40 MG  tablet Take 1 tablet (40 mg) by mouth daily 90 tablet 3     empagliflozin (JARDIANCE) 25 MG TABS tablet Take 1 tablet (25 mg) by mouth daily 30 tablet 11     glipiZIDE (GLUCOTROL) 5 MG tablet Take 2 tablets (10 mg) by mouth 2 times daily (before meals) 360 tablet 3     lisinopril (PRINIVIL/ZESTRIL) 40 MG tablet Take 1 tablet (40 mg) by mouth daily 90 tablet 3     metFORMIN (GLUCOPHAGE-XR) 500 MG 24 hr tablet Take 2 tablets (1,000 mg) by mouth daily 360 tablet 3       Review of Systems   Constitutional: Negative.    Eyes: Negative.    Endocrine: Negative.    Allergic/Immunologic: Negative.        Physical Exam  Vitals signs and nursing note reviewed.   Constitutional:       General: He is not in acute distress.     Appearance: Normal appearance. He is not ill-appearing, toxic-appearing or diaphoretic.   Neurological:      Mental Status: He is alert.         Assessment:        ICD-10-CM    1. 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 Screening for AAA (abdominal aortic aneurysm) Z13.6 Abdominal Aortic Aneurysm Screening/Tracking       Plan: I expect that his metformin is causing his diarrhea.  He will try decreasing to 1000 mg daily.  Add Jardiance 25 mg daily.  Uncertain whether his insurance will cover this or whether he can [facility].  If not, he is probably going to have to go on insulin therapy.  I encouraged him to [facility] so that [facility] can get [facility] them.  Flu shot given today.  If decreasing the Metformin does not improve his diarrhea he will have to let me know.

## 2019-10-07 NOTE — NURSING NOTE
"The patient is here today to be seen for diarrhea that has lasted over the last month,.  Hawa Brooks LPN on 10/7/2019 at 1:30 PM  Chief Complaint   Patient presents with     Diarrhea       Initial /88 (BP Location: Right arm, Patient Position: Sitting, Cuff Size: Adult Large)   Pulse 76   Temp 98.2  F (36.8  C) (Tympanic)   Resp 18   Wt 96.6 kg (213 lb)   BMI 30.78 kg/m   Estimated body mass index is 30.78 kg/m  as calculated from the following:    Height as of 4/1/19: 1.772 m (5' 9.75\").    Weight as of this encounter: 96.6 kg (213 lb).  Medication Reconciliation: complete    Hawa Brooks LPN    "

## 2019-12-04 ENCOUNTER — OFFICE VISIT (OUTPATIENT)
Dept: INTERNAL MEDICINE | Facility: OTHER | Age: 74
End: 2019-12-04
Attending: INTERNAL MEDICINE
Payer: MEDICARE

## 2019-12-04 VITALS
WEIGHT: 209.4 LBS | HEART RATE: 77 BPM | RESPIRATION RATE: 16 BRPM | SYSTOLIC BLOOD PRESSURE: 130 MMHG | OXYGEN SATURATION: 100 % | TEMPERATURE: 96.9 F | DIASTOLIC BLOOD PRESSURE: 80 MMHG | BODY MASS INDEX: 29.31 KG/M2 | HEIGHT: 71 IN

## 2019-12-04 DIAGNOSIS — E11.319 TYPE 2 DIABETES MELLITUS WITH RETINOPATHY OF LEFT EYE, WITHOUT LONG-TERM CURRENT USE OF INSULIN, MACULAR EDEMA PRESENCE UNSPECIFIED, UNSPECIFIED RETINOPATHY SEVERITY (H): Primary | ICD-10-CM

## 2019-12-04 LAB — HBA1C MFR BLD: 6.3 % (ref 4–6)

## 2019-12-04 PROCEDURE — 99213 OFFICE O/P EST LOW 20 MIN: CPT | Performed by: INTERNAL MEDICINE

## 2019-12-04 PROCEDURE — 83036 HEMOGLOBIN GLYCOSYLATED A1C: CPT | Mod: ZL | Performed by: INTERNAL MEDICINE

## 2019-12-04 PROCEDURE — 36415 COLL VENOUS BLD VENIPUNCTURE: CPT | Mod: ZL | Performed by: INTERNAL MEDICINE

## 2019-12-04 PROCEDURE — G0463 HOSPITAL OUTPT CLINIC VISIT: HCPCS

## 2019-12-04 ASSESSMENT — ENCOUNTER SYMPTOMS
CONSTITUTIONAL NEGATIVE: 1
ENDOCRINE NEGATIVE: 1
EYES NEGATIVE: 1
ALLERGIC/IMMUNOLOGIC NEGATIVE: 1

## 2019-12-04 ASSESSMENT — PAIN SCALES - GENERAL: PAINLEVEL: EXTREME PAIN (8)

## 2019-12-04 ASSESSMENT — MIFFLIN-ST. JEOR: SCORE: 1704.02

## 2019-12-04 NOTE — LETTER
December 4, 2019      Trevor Alexandre  [address]  [facility] [address]        Dear Irving,    Below are the results of your recent labs:    Results for orders placed or performed in visit on 12/04/19   Hemoglobin A1c     Status: Abnormal   Result Value Ref Range    Hemoglobin A1C 6.3 (H) 4.0 - 6.0 %        Your diabetes test looks fantastic.  Congratulations and keep up the good work.  Assuming all goes well, return in April for your yearly physical.    Sincerely,        Landen Gavin MD  Internal Medicine  [facility]

## 2019-12-04 NOTE — PROGRESS NOTES
Chief Complaint: Recheck on diabetes and problems with back pain.    HPI: He is here for recheck on his diabetes.  We made a change in his diabetic medications the last time because he was having diarrhea from metformin.  We cut the dose of metformin in half and I put him on Jardiance 25 mg daily.  He feels great with this regimen and he thinks his diabetes is in good control.    He has a little bit of back pain on the left side.  It started about a week ago or so.  No injury that he can recall.  No radiation of the pain.  No bowel or bladder symptoms.    Past Medical History:   Diagnosis Date     Abscess of back 03/23/2017     Diabetes mellitus, type 2 (H)      Diabetic retinopathy (H)      Hx of adenomatous colonic polyps      Hyperlipidemia      Hypertension      Inflammatory arthritis        Past Surgical History:   Procedure Laterality Date     ARTHROSCOPY SHOULDER RT/LT Bilateral      CHOLECYSTECTOMY       COLONOSCOPY  12/02/2014    Adenomatous polyps     COLONOSCOPY N/A 5/8/2019    adenomas, follow up in 2022     EXAM UNDER ANESTHESIA, CHANGE DRESSING (LOCATION), COMBINED N/A 3/24/2017    Procedure: COMBINED EXAM UNDER ANESTHESIA, CHANGE DRESSING (LOCATION);  Surgeon: Tahir De Jesus DO;  [facility]: [facility]     EXCISE LESION BACK N/A 3/22/2017    Procedure: EXCISE LESION BACK;  Surgeon: Servando Dale MD;  Location: [facility]     FINGER SURGERY Right     Middle     HEMORRHOIDECTOMY BANDING N/A 5/8/2019    Procedure: Hemorrhoid banding;  Surgeon: Velvet Lees MD;  Location: [facility]     IRRIGATION AND DEBRIDEMENT TRUNK, COMBINED N/A 3/23/2017    Procedure: COMBINED IRRIGATION AND DEBRIDEMENT TRUNK;  Surgeon: Servando Dale MD;  Location: [facility]       No Known Allergies    Current Outpatient Medications   Medication Sig Dispense Refill     aspirin 81 MG EC tablet Take 1 tablet (81 mg) by mouth daily 90 tablet 3     atorvastatin (LIPITOR) 40 MG tablet Take 1 tablet (40 mg) by mouth daily 90 tablet 3      empagliflozin (JARDIANCE) 25 MG TABS tablet Take 1 tablet (25 mg) by mouth daily 30 tablet 11     glipiZIDE (GLUCOTROL) 5 MG tablet Take 2 tablets (10 mg) by mouth 2 times daily (before meals) 360 tablet 3     lisinopril (PRINIVIL/ZESTRIL) 40 MG tablet Take 1 tablet (40 mg) by mouth daily 90 tablet 3     metFORMIN (GLUCOPHAGE-XR) 500 MG 24 hr tablet Take 2 tablets (1,000 mg) by mouth daily 360 tablet 3       Review of Systems   Constitutional: Negative.    Eyes: Negative.    Endocrine: Negative.    Allergic/Immunologic: Negative.        Physical Exam  Vitals signs and nursing note reviewed.   Constitutional:       General: He is not in acute distress.     Appearance: Normal appearance. He is not ill-appearing, toxic-appearing or diaphoretic.   Abdominal:      General: There is no distension.      Palpations: Abdomen is soft.      Tenderness: There is no abdominal tenderness.   Musculoskeletal:        Back:    Neurological:      General: No focal deficit present.      Mental Status: He is alert.         Assessment:        ICD-10-CM    1. 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  A1c pending, I will send him a letter with the results.  Consider increasing Jardiance if necessary.  In regards to his back pain, I think this is just muscular and should improve with symptomatic measures including heat, anti-inflammatories, etc.  Return if not improving.  Assuming all goes well, I will plan to see him again in April for complete evaluation.

## 2019-12-04 NOTE — NURSING NOTE
Chief Complaint   Patient presents with     RECHECK     Back pain, check A1C   Patient presents to the clinic today fro back pain and to check A1C    Medication Reconciliation: completed   Vicki Herndon LPN  12/4/2019 2:21 PM

## 2020-01-07 ENCOUNTER — MYC MEDICAL ADVICE (OUTPATIENT)
Dept: INTERNAL MEDICINE | Facility: OTHER | Age: 75
End: 2020-01-07

## 2020-01-07 NOTE — TELEPHONE ENCOUNTER
Notify the patient that I am happy to make this change, but if I send in the prescription now the pharmacist will fill it now.  He should let me know at the time that he needs a refill.

## 2020-01-08 NOTE — TELEPHONE ENCOUNTER
Spoke with patient and relayed message. Patient will call back in a few months as he just received his prescription. Janette Ramírez LPN .............1/8/2020  9:30 AM

## 2020-02-06 ENCOUNTER — MYC MEDICAL ADVICE (OUTPATIENT)
Dept: INTERNAL MEDICINE | Facility: OTHER | Age: 75
End: 2020-02-06

## 2020-02-06 DIAGNOSIS — E11.319 TYPE 2 DIABETES MELLITUS WITH RETINOPATHY OF LEFT EYE, WITHOUT LONG-TERM CURRENT USE OF INSULIN, MACULAR EDEMA PRESENCE UNSPECIFIED, UNSPECIFIED RETINOPATHY SEVERITY (H): Primary | ICD-10-CM

## 2020-02-06 RX ORDER — GLIPIZIDE 10 MG/1
10 TABLET ORAL
Qty: 180 TABLET | Refills: 3 | Status: SHIPPED | OUTPATIENT
Start: 2020-02-06 | End: 2021-01-26

## 2020-02-24 ENCOUNTER — HEALTH MAINTENANCE LETTER (OUTPATIENT)
Age: 75
End: 2020-02-24

## 2020-03-04 DIAGNOSIS — E78.5 HYPERLIPIDEMIA LDL GOAL <100: ICD-10-CM

## 2020-03-06 RX ORDER — ATORVASTATIN CALCIUM 40 MG/1
TABLET, FILM COATED ORAL
Qty: 90 TABLET | Refills: 0 | Status: SHIPPED | OUTPATIENT
Start: 2020-03-06 | End: 2020-05-21

## 2020-03-06 NOTE — TELEPHONE ENCOUNTER
Prescription approved per [facility] Refill Protocol.  LOV 12/4/19  Last lipid: 4/1/19    Shameka Santizo RN on 3/6/2020 at 2:49 PM

## 2020-03-17 DIAGNOSIS — E78.5 HYPERLIPIDEMIA LDL GOAL <100: ICD-10-CM

## 2020-03-17 NOTE — LETTER
3/18/2020       Dear Trevor Alexandre,    A refill of Atorvastatin (Lipitor) has been requested by your pharmacy, [facility]: [place]. We noticed that you are due for a diabetes recheck in April 2020. Your last comprehensive visit with Dr. Landen Gavin was on December 4, 2019. Per Dr. Gavin's documentation he recommended that you be seen for a recheck in April 2020.     This refill request has been denied at this time as it was recently approved for refill on March 6, 2020 for a 90-day supply. This refill should provide you with enough medication until June 2020.     Your health is very important to us. Please call the clinic at [phone] to schedule your appointment.    If you are no longer seeing Dr. Landen Gavin for primary care, please call to let us know. Doing so will remove you from our call/contact list.    Thank you for choosing [facility] for your health care needs.      Sincerely,      Refill RN  [facility]

## 2020-03-18 RX ORDER — ATORVASTATIN CALCIUM 40 MG/1
TABLET, FILM COATED ORAL
Qty: 90 TABLET | Refills: 0 | OUTPATIENT
Start: 2020-03-18

## 2020-03-18 NOTE — TELEPHONE ENCOUNTER
"Refill Request for: Atorvastatin (LIPITOR) 40 MG tablet    Received From: [facility]  Last Written Prescription Date: 3/6/2020 for 90 tablets and 0 refills; E-Prescribing Status: Receipt confirmed by pharmacy (3/6/2020  2:50 PM CST) to [facility]  LOV: 12/4/2019. Per LOV: \"Assuming all goes well, I will plan to see him again in April for complete evaluation.\"  Next Appointment: No upcoming office visit on file during initial refill review with PCP  Protocol: [facility] Protocol Passed - 03/18/2020 03:37 PM    Refill request denied; too soon, last written prescription should provide adequate medication supply.     Letter sent to patient to inform him of the need for a DM check.       Christy PITTMAN, RN on 3/18/2020 at 3:42 PM    "

## 2020-05-21 DIAGNOSIS — E11.319 TYPE 2 DIABETES MELLITUS WITH RETINOPATHY OF LEFT EYE, WITHOUT LONG-TERM CURRENT USE OF INSULIN, MACULAR EDEMA PRESENCE UNSPECIFIED, UNSPECIFIED RETINOPATHY SEVERITY (H): ICD-10-CM

## 2020-05-21 DIAGNOSIS — E78.5 HYPERLIPIDEMIA LDL GOAL <100: ICD-10-CM

## 2020-05-21 NOTE — TELEPHONE ENCOUNTER
Routing refill request to provider for review/approval because:  Labs not current:  LDL, Creatinine, and Potassium    Last refill  Atorvastatin 40mg tablet  3/6/2020  90# 0 Refills    Jardiance 25mg tablet  10/7/2019  30# 11 Refills    LOV: 12/4/2019  No future appointments Natalia Lynn RN on 5/21/2020 at 4:43 PM

## 2020-05-22 RX ORDER — ATORVASTATIN CALCIUM 40 MG/1
TABLET, FILM COATED ORAL
Qty: 90 TABLET | Refills: 1 | Status: SHIPPED | OUTPATIENT
Start: 2020-05-22 | End: 2020-12-04

## 2020-06-03 ENCOUNTER — OFFICE VISIT (OUTPATIENT)
Dept: INTERNAL MEDICINE | Facility: OTHER | Age: 75
End: 2020-06-03
Attending: INTERNAL MEDICINE
Payer: MEDICARE

## 2020-06-03 VITALS
SYSTOLIC BLOOD PRESSURE: 136 MMHG | OXYGEN SATURATION: 100 % | WEIGHT: 207.4 LBS | BODY MASS INDEX: 30.72 KG/M2 | RESPIRATION RATE: 16 BRPM | HEIGHT: 69 IN | HEART RATE: 88 BPM | DIASTOLIC BLOOD PRESSURE: 70 MMHG | TEMPERATURE: 99 F

## 2020-06-03 DIAGNOSIS — M19.90 INFLAMMATORY ARTHRITIS: ICD-10-CM

## 2020-06-03 DIAGNOSIS — E78.5 HYPERLIPIDEMIA LDL GOAL <100: ICD-10-CM

## 2020-06-03 DIAGNOSIS — I10 BENIGN ESSENTIAL HYPERTENSION: ICD-10-CM

## 2020-06-03 DIAGNOSIS — Z86.0101 HX OF ADENOMATOUS COLONIC POLYPS: ICD-10-CM

## 2020-06-03 DIAGNOSIS — E11.319 TYPE 2 DIABETES MELLITUS WITH RETINOPATHY OF LEFT EYE, WITHOUT LONG-TERM CURRENT USE OF INSULIN, MACULAR EDEMA PRESENCE UNSPECIFIED, UNSPECIFIED RETINOPATHY SEVERITY (H): Primary | ICD-10-CM

## 2020-06-03 LAB
ALBUMIN SERPL-MCNC: 4.8 G/DL (ref 3.5–5.7)
ALP SERPL-CCNC: 72 U/L (ref 34–104)
ALT SERPL W P-5'-P-CCNC: 16 U/L (ref 7–52)
ANION GAP SERPL CALCULATED.3IONS-SCNC: 11 MMOL/L (ref 3–14)
AST SERPL W P-5'-P-CCNC: 17 U/L (ref 13–39)
BILIRUB SERPL-MCNC: 1.1 MG/DL (ref 0.3–1)
BUN SERPL-MCNC: 18 MG/DL (ref 7–25)
CALCIUM SERPL-MCNC: 9.9 MG/DL (ref 8.6–10.3)
CHLORIDE SERPL-SCNC: 104 MMOL/L (ref 98–107)
CHOLEST SERPL-MCNC: 124 MG/DL
CO2 SERPL-SCNC: 23 MMOL/L (ref 21–31)
CREAT SERPL-MCNC: 0.92 MG/DL (ref 0.7–1.3)
GFR SERPL CREATININE-BSD FRML MDRD: 80 ML/MIN/{1.73_M2}
GLUCOSE SERPL-MCNC: 100 MG/DL (ref 70–105)
HBA1C MFR BLD: 6 % (ref 4–6)
HDLC SERPL-MCNC: 41 MG/DL (ref 23–92)
LDLC SERPL CALC-MCNC: 65 MG/DL
NONHDLC SERPL-MCNC: 83 MG/DL
POTASSIUM SERPL-SCNC: 4 MMOL/L (ref 3.5–5.1)
PROT SERPL-MCNC: 7.8 G/DL (ref 6.4–8.9)
SODIUM SERPL-SCNC: 138 MMOL/L (ref 134–144)
TRIGL SERPL-MCNC: 89 MG/DL

## 2020-06-03 PROCEDURE — G0463 HOSPITAL OUTPT CLINIC VISIT: HCPCS

## 2020-06-03 PROCEDURE — 80053 COMPREHEN METABOLIC PANEL: CPT | Mod: ZL | Performed by: INTERNAL MEDICINE

## 2020-06-03 PROCEDURE — 80061 LIPID PANEL: CPT | Mod: ZL | Performed by: INTERNAL MEDICINE

## 2020-06-03 PROCEDURE — 83036 HEMOGLOBIN GLYCOSYLATED A1C: CPT | Mod: ZL | Performed by: INTERNAL MEDICINE

## 2020-06-03 PROCEDURE — 36415 COLL VENOUS BLD VENIPUNCTURE: CPT | Mod: ZL | Performed by: INTERNAL MEDICINE

## 2020-06-03 PROCEDURE — 99215 OFFICE O/P EST HI 40 MIN: CPT | Performed by: INTERNAL MEDICINE

## 2020-06-03 SDOH — HEALTH STABILITY: MENTAL HEALTH: HOW OFTEN DO YOU HAVE A DRINK CONTAINING ALCOHOL?: 2-4 TIMES A MONTH

## 2020-06-03 SDOH — HEALTH STABILITY: MENTAL HEALTH: HOW MANY STANDARD DRINKS CONTAINING ALCOHOL DO YOU HAVE ON A TYPICAL DAY?: 1 OR 2

## 2020-06-03 ASSESSMENT — ENCOUNTER SYMPTOMS
NERVOUS/ANXIOUS: 0
UNEXPECTED WEIGHT CHANGE: 0
DIARRHEA: 0
NAUSEA: 0
FEVER: 0
HEADACHES: 0
BRUISES/BLEEDS EASILY: 0
WEAKNESS: 0
NECK PAIN: 0
LIGHT-HEADEDNESS: 0
FLANK PAIN: 0
NUMBNESS: 0
SINUS PAIN: 0
DIZZINESS: 0
EYE PAIN: 0
CONSTIPATION: 0
DIFFICULTY URINATING: 0
CHEST TIGHTNESS: 0
HEMATURIA: 0
SEIZURES: 0
CONFUSION: 0
COUGH: 0
VOICE CHANGE: 0
BLOOD IN STOOL: 0
HALLUCINATIONS: 0
FREQUENCY: 0
TROUBLE SWALLOWING: 0
VOMITING: 0
PALPITATIONS: 0
AGITATION: 0
WHEEZING: 0
RHINORRHEA: 0
SINUS PRESSURE: 0
JOINT SWELLING: 0
APPETITE CHANGE: 0
DIAPHORESIS: 0
BACK PAIN: 0
EYE REDNESS: 0
FATIGUE: 0
SORE THROAT: 0
TREMORS: 0
SHORTNESS OF BREATH: 0
ABDOMINAL PAIN: 0
SPEECH DIFFICULTY: 0
DYSURIA: 0
SLEEP DISTURBANCE: 0
ADENOPATHY: 0
CHILLS: 0
ABDOMINAL DISTENTION: 0
NECK STIFFNESS: 0
WOUND: 0

## 2020-06-03 ASSESSMENT — PAIN SCALES - GENERAL: PAINLEVEL: NO PAIN (0)

## 2020-06-03 ASSESSMENT — MIFFLIN-ST. JEOR: SCORE: 1676.39

## 2020-06-03 NOTE — LETTER
Stephanie 3, 2020      Trevor Alexandre  [address] [place] [address]        Dear Irving,    Below are the results of your recent labs:    Results for orders placed or performed in visit on 06/03/20   Lipid Panel     Status: None   Result Value Ref Range    Cholesterol 124 <200 mg/dL    Triglycerides 89 <150 mg/dL    HDL Cholesterol 41 23 - 92 mg/dL    LDL Cholesterol Calculated 65 <100 mg/dL    Non HDL Cholesterol 83 <130 mg/dL   Hemoglobin A1c     Status: None   Result Value Ref Range    Hemoglobin A1C 6.0 4.0 - 6.0 %   Comprehensive Metabolic Panel     Status: Abnormal   Result Value Ref Range    Sodium 138 134 - 144 mmol/L    Potassium 4.0 3.5 - 5.1 mmol/L    Chloride 104 98 - 107 mmol/L    Carbon Dioxide 23 21 - 31 mmol/L    Anion Gap 11 3 - 14 mmol/L    Glucose 100 70 - 105 mg/dL    Urea Nitrogen 18 7 - 25 mg/dL    Creatinine 0.92 0.70 - 1.30 mg/dL    GFR Estimate 80 >60 mL/min/[1.73_m2]    GFR Estimate If Black >90 >60 mL/min/[1.73_m2]    Calcium 9.9 8.6 - 10.3 mg/dL    Bilirubin Total 1.1 (H) 0.3 - 1.0 mg/dL    Albumin 4.8 3.5 - 5.7 g/dL    Protein Total 7.8 6.4 - 8.9 g/dL    Alkaline Phosphatase 72 34 - 104 U/L    ALT 16 7 - 52 U/L    AST 17 13 - 39 U/L        Your blood tests look great.  Congratulations on this report and keep up the good work.  Assuming all goes well, we should recheck your diabetes again in 6 months.    Sincerely,        Landen Gavin MD  Internal Medicine  [facility]

## 2020-06-03 NOTE — NURSING NOTE
Trevor Alexandre is a 74 year old male presenting today for: medication review  Medication Reconciliation: complete    Liliane Blair LPN 6/3/2020 1:58 PM

## 2020-06-03 NOTE — PROGRESS NOTES
Chief Complaint:  This patient is here for a comprehensive review of their multiple medical problems, renewal of medications and update on necessary health maintenance issues.      HPI: This patient is here today for complete evaluation and a review of his chronic medical problems.  He is doing well at this point in time.  He had some diarrhea when he was on full dose metformin.  We cut his dose in half and added Jardiance and with that he really seems to be doing very well.  His last A1c was excellent.  He no longer has any diarrhea of consequence, just some occasional loose stools which he is comfortable with.  His last colonoscopy was a year ago with polyps, he will need another one in 2 years.    [facility]  He has had an ultrasound of the aorta there as well as [facility].    In addition to his diabetes, the patient does have hypertension.  He is on single drug therapy with good control of his blood pressure and no endorgan disease.  He also has hyperlipidemia.  He is on high intensity statin therapy which he tolerates without difficulty.  He does not have any vascular disease.    He currently has no other complaints or concerns other than his arthritis.  His hip bothers him quite a bit but he is not wanting to have anything done for it at this time.  Medications are reconciled.  Past medical history, past surgical history, family history and social histories are reviewed and updated.    Past Medical History:   Diagnosis Date     Abscess of back 03/23/2017     Diabetes mellitus, type 2 (H)      Diabetic retinopathy (H)      Hx of adenomatous colonic polyps      Hyperlipidemia      Hypertension      Inflammatory arthritis        Past Surgical History:   Procedure Laterality Date     ARTHROSCOPY SHOULDER RT/LT Bilateral      CHOLECYSTECTOMY       COLONOSCOPY  12/02/2014    Adenomatous polyps     COLONOSCOPY N/A 5/8/2019    adenomas, follow up in 2022      EXAM UNDER ANESTHESIA, CHANGE DRESSING (LOCATION), COMBINED N/A 3/24/2017    Procedure: COMBINED EXAM UNDER ANESTHESIA, CHANGE DRESSING (LOCATION);  Surgeon: Tahir De Jesus DO;  Location: [facility]     EXCISE LESION BACK N/A 3/22/2017    Procedure: EXCISE LESION BACK;  Surgeon: Servando Dale MD;  Location: [facility]     FINGER SURGERY Right     Middle     HEMORRHOIDECTOMY BANDING N/A 5/8/2019    Procedure: Hemorrhoid banding;  Surgeon: Velvet Lees MD;  Location: [facility]     IRRIGATION AND DEBRIDEMENT TRUNK, COMBINED N/A 3/23/2017    Procedure: COMBINED IRRIGATION AND DEBRIDEMENT TRUNK;  Surgeon: Servando Dale MD;  Location: [facility]       Current Outpatient Medications   Medication Sig Dispense Refill     aspirin 81 MG EC tablet Take 1 tablet (81 mg) by mouth daily 90 tablet 3     atorvastatin (LIPITOR) 40 MG tablet Take 1 tablet by mouth once daily 90 tablet 1     empagliflozin (JARDIANCE) 25 MG TABS tablet Take 1 tablet (25 mg) by mouth daily 30 tablet 1     glipiZIDE (GLUCOTROL) 10 MG tablet Take 1 tablet (10 mg) by mouth 2 times daily (before meals) 180 tablet 3     lisinopril (PRINIVIL/ZESTRIL) 40 MG tablet Take 1 tablet (40 mg) by mouth daily 90 tablet 3     metFORMIN (GLUCOPHAGE-XR) 500 MG 24 hr tablet Take 2 tablets (1,000 mg) by mouth daily 360 tablet 3       No Known Allergies    Family History   Problem Relation Age of Onset     Heart Disease Mother         MI     Alzheimer Disease Father      Alzheimer Disease Brother      Other - See Comments Brother         CNS aneurysm       Social History     Socioeconomic History     Marital status:      Spouse name: Not on file     Number of children: Not on file     Years of education: Not on file     Highest education level: Not on file   Occupational History     Not on file   Social Needs     Financial resource strain: Not on file     Food insecurity     Worry: Not on file     Inability: Not on file     Transportation needs     Medical: Not on file      Non-medical: Not on file   Tobacco Use     Smoking status: Former Smoker     Types: Cigarettes     Last attempt to quit: 3/27/2002     Years since quittin.2     Smokeless tobacco: Never Used   Substance and Sexual Activity     Alcohol use: Yes     Frequency: 2-4 times a month     Drinks per session: 1 or 2     Comment: occasionally     Drug use: No     Sexual activity: Not Currently   Lifestyle     Physical activity     Days per week: Not on file     Minutes per session: Not on file     Stress: Not on file   Relationships     Social connections     Talks on phone: Not on file     Gets together: Not on file     Attends [employer] service: Not on file     Active member of club or organization: Not on file     Attends meetings of clubs or organizations: Not on file     Relationship status: Not on file     Intimate partner violence     Fear of current or ex partner: Not on file     Emotionally abused: Not on file     Physically abused: Not on file     Forced sexual activity: Not on file   Other Topics Concern     Parent/sibling w/ CABG, MI or angioplasty before 65F 55M? Not Asked   Social History Narrative    Lives in [place].   twice.  Retired from paper [employer].       Review of Systems   Constitutional: Negative for appetite change, chills, diaphoresis, fatigue, fever and unexpected weight change.   HENT: Negative for ear pain, rhinorrhea, sinus pressure, sinus pain, sore throat, trouble swallowing and voice change.    Eyes: Negative for pain, redness and visual disturbance.   Respiratory: Negative for cough, chest tightness, shortness of breath and wheezing.    Cardiovascular: Negative for chest pain, palpitations and leg swelling.   Gastrointestinal: Negative for abdominal distention, abdominal pain, blood in stool, constipation, diarrhea, nausea and vomiting.   Endocrine: Negative for cold intolerance and heat intolerance.   Genitourinary: Negative for difficulty urinating, dysuria, flank pain,  frequency and hematuria.   Musculoskeletal: Negative for back pain, joint swelling, neck pain and neck stiffness.   Skin: Negative for rash and wound.   Allergic/Immunologic: Negative for immunocompromised state.   Neurological: Negative for dizziness, tremors, seizures, syncope, speech difficulty, weakness, light-headedness, numbness and headaches.   Hematological: Negative for adenopathy. Does not bruise/bleed easily.   Psychiatric/Behavioral: Negative for agitation, behavioral problems, confusion, hallucinations and sleep disturbance. The patient is not nervous/anxious.        Physical Exam  Vitals signs and nursing note reviewed.   Constitutional:       General: He is not in acute distress.     Appearance: He is well-developed. He is not diaphoretic.   HENT:      Head: Normocephalic.      Right Ear: Tympanic membrane, ear canal and external ear normal.      Left Ear: Tympanic membrane, ear canal and external ear normal.      Nose: Nose normal.      Mouth/Throat:      Pharynx: No oropharyngeal exudate.   Eyes:      Conjunctiva/sclera: Conjunctivae normal.      Pupils: Pupils are equal, round, and reactive to light.   Neck:      Musculoskeletal: Normal range of motion and neck supple.      Thyroid: No thyroid mass or thyromegaly.      Vascular: Normal carotid pulses. No carotid bruit or JVD.      Trachea: No tracheal deviation.   Cardiovascular:      Rate and Rhythm: Normal rate and regular rhythm.      Heart sounds: Normal heart sounds. No murmur. No friction rub. No gallop.    Pulmonary:      Effort: Pulmonary effort is normal. No respiratory distress.      Breath sounds: Normal breath sounds. No stridor. No decreased breath sounds, wheezing, rhonchi or rales.   Abdominal:      General: Bowel sounds are normal. There is no distension.      Palpations: Abdomen is soft. There is no mass.      Tenderness: There is no abdominal tenderness. There is no guarding or rebound.      Hernia: No hernia is present.    Genitourinary:     Penis: Normal.       Scrotum/Testes: Normal.      Prostate: Normal.      Rectum: Normal.   Musculoskeletal: Normal range of motion.      Comments: Trace pretibial edema   Lymphadenopathy:      Cervical: No cervical adenopathy.   Skin:     General: Skin is warm and dry.      Findings: No rash.   Neurological:      Mental Status: He is alert and oriented to person, place, and time.      Cranial Nerves: No cranial nerve deficit.      Sensory: No sensory deficit.      Motor: No abnormal muscle tone.      Coordination: Coordination normal.      Deep Tendon Reflexes: Reflexes normal.         Assessment:      ICD-10-CM    1. 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 Benign essential hypertension  I10    3. Hyperlipidemia LDL goal <100  E78.5    4. Hx of adenomatous colonic polyps  Z86.010    5. Inflammatory arthritis  M19.90         Plan: He appears to be doing very well at this time.  His exam is unremarkable.  Lab is pending, I will send him a letter with the results.  I fully expect that his results will look very good and I will just have him come back in 6 months for recheck A1c.  Obviously if he has worsening problems with arthritis including hip pain, we can evaluate that further but for the time being he does not want to do anything more.  Encouraged him to continue with a healthy diet and to continue to check his blood sugars occasionally.

## 2020-06-23 ENCOUNTER — MYC MEDICAL ADVICE (OUTPATIENT)
Dept: INTERNAL MEDICINE | Facility: OTHER | Age: 75
End: 2020-06-23

## 2020-06-23 DIAGNOSIS — E11.319 TYPE 2 DIABETES MELLITUS WITH RETINOPATHY OF LEFT EYE, WITHOUT LONG-TERM CURRENT USE OF INSULIN, MACULAR EDEMA PRESENCE UNSPECIFIED, UNSPECIFIED RETINOPATHY SEVERITY (H): ICD-10-CM

## 2020-06-23 NOTE — TELEPHONE ENCOUNTER
Notify the patient that his pharmacist should be able to get him an alternative metformin that is not on recall.

## 2020-06-23 NOTE — TELEPHONE ENCOUNTER
After the patient's full name and date of birth was verified, the patient was notified of the below information.  Liliane Blair LPN on 6/23/2020 at 8:30 AM

## 2020-06-24 DIAGNOSIS — E11.319 TYPE 2 DIABETES MELLITUS WITH RETINOPATHY OF LEFT EYE, WITHOUT LONG-TERM CURRENT USE OF INSULIN, MACULAR EDEMA PRESENCE UNSPECIFIED, UNSPECIFIED RETINOPATHY SEVERITY (H): ICD-10-CM

## 2020-06-24 RX ORDER — METFORMIN HCL 500 MG
1000 TABLET, EXTENDED RELEASE 24 HR ORAL DAILY
Qty: 360 TABLET | Refills: 3 | Status: SHIPPED | OUTPATIENT
Start: 2020-06-24 | End: 2021-09-14

## 2020-06-24 RX ORDER — METFORMIN HCL 500 MG
TABLET, EXTENDED RELEASE 24 HR ORAL
Qty: 360 TABLET | Refills: 0 | OUTPATIENT
Start: 2020-06-24

## 2020-06-24 NOTE — TELEPHONE ENCOUNTER
Routing refill request to provider for review/approval because:  Drug not on the [facility] refill protocol   Alternative medication due to recall    Last refill  Metformin 500 mg tablet  2 tablets by mouth daily  10/7/2019  360# 3 Refills    -Requested alternative dosage/sig not the same as medication on list. Please advise. Natalia Lynn, RN on 6/24/2020 at 9:52 AM

## 2020-06-24 NOTE — TELEPHONE ENCOUNTER
Refill denied    Medication filled on 10/7/2019  360# 3 Refills Natalia Lynn RN on 6/24/2020 at 9:06 AM

## 2020-07-23 DIAGNOSIS — E11.319 TYPE 2 DIABETES MELLITUS WITH RETINOPATHY OF LEFT EYE, WITHOUT LONG-TERM CURRENT USE OF INSULIN, MACULAR EDEMA PRESENCE UNSPECIFIED, UNSPECIFIED RETINOPATHY SEVERITY (H): ICD-10-CM

## 2020-07-24 RX ORDER — LISINOPRIL 40 MG/1
TABLET ORAL
Qty: 90 TABLET | Refills: 3 | Status: SHIPPED | OUTPATIENT
Start: 2020-07-24 | End: 2021-07-26

## 2020-07-24 NOTE — TELEPHONE ENCOUNTER
"Requested Prescriptions   Pending Prescriptions Disp Refills     lisinopril (ZESTRIL) 40 MG tablet [Pharmacy Med Name: Lisinopril 40 MG Oral Tablet] 90 tablet 0     Sig: Take 1 tablet by mouth once daily       ACE Inhibitors (Including Combos) Protocol Passed - 7/23/2020  6:18 AM        Passed - Blood pressure under 140/90 in past 12 months     BP Readings from Last 3 Encounters:   06/03/20 136/70   12/04/19 130/80   10/07/19 132/88                 Passed - Recent (12 mo) or future (30 days) visit within the authorizing provider's specialty     Patient has had an office visit with the authorizing provider or a provider within the authorizing providers department within the previous 12 mos or has a future within next 30 days. See \"Patient Info\" tab in inbasket, or \"Choose Columns\" in Meds & Orders section of the refill encounter.              Passed - Medication is active on med list        Passed - Patient is age 18 or older        Passed - Normal serum creatinine on file in past 12 months     Recent Labs   Lab Test 06/03/20  1422   CR 0.92       Ok to refill medication if creatinine is low          Passed - Normal serum potassium on file in past 12 months     Recent Labs   Lab Test 06/03/20  1422   POTASSIUM 4.0                    Last Written Prescription Date:  04/01/2019  Last Fill Quantity: 90,   # refills: 3  Last Office Visit: 06/03/2020 with Landen Gavin MD  Future Office visit:   None noted.  Prescription approved per [facility] Refill Protocol.  Lo Brock RN on 7/24/2020 at 12:03 PM            "

## 2020-07-29 DIAGNOSIS — E11.319 TYPE 2 DIABETES MELLITUS WITH RETINOPATHY OF LEFT EYE, WITHOUT LONG-TERM CURRENT USE OF INSULIN, MACULAR EDEMA PRESENCE UNSPECIFIED, UNSPECIFIED RETINOPATHY SEVERITY (H): ICD-10-CM

## 2020-08-03 NOTE — TELEPHONE ENCOUNTER
[facility] sent Rx request for the following:   empagliflozin (JARDIANCE) 25 MG TABS tablet   Sig: Take 1 tablet (25 mg) by mouth daily - Oral     Last Prescription Date:   05/22/2020  Last Fill Qty/Refills:         30, R-1    Last Office Visit:              06/03/2020   Future Office visit:           None      Sodium Glucose Co-Transport Inhibitor Agents [id number]/03/2020 09:03 AM      Prescription refilled per RN Medication Refill Policy.................... Shilpi Fontaine RN ....................  8/3/2020   9:05 AM

## 2020-12-04 DIAGNOSIS — E78.5 HYPERLIPIDEMIA LDL GOAL <100: ICD-10-CM

## 2020-12-04 RX ORDER — ATORVASTATIN CALCIUM 40 MG/1
TABLET, FILM COATED ORAL
Qty: 90 TABLET | Refills: 1 | Status: SHIPPED | OUTPATIENT
Start: 2020-12-04 | End: 2021-06-21

## 2020-12-04 NOTE — TELEPHONE ENCOUNTER
"[facility] pharmacy sent Rx request for the following:    atorvastatin (LIPITOR) 40 MG tablet  Take 1 tablet by mouth once daily,   Last Prescription Date:   05/22/2020  Last Fill Qty/Refills:         90, R-1  Last Office Visit:              06/03/2020  Future Office visit:           None    Requested Prescriptions   Pending Prescriptions Disp Refills     atorvastatin (LIPITOR) 40 MG tablet [Pharmacy Med Name: Atorvastatin Calcium 40 MG Oral Tablet] 90 tablet 0     Sig: Take 1 tablet by mouth once daily       Statins Protocol Passed - 12/4/2020  2:39 PM        Passed - LDL on file in past 12 months     Recent Labs   Lab Test 06/03/20  1422   LDL 65             Passed - No abnormal creatine kinase in past 12 months     No lab results found.             Passed - Recent (12 mo) or future (30 days) visit within the authorizing provider's specialty     Patient has had an office visit with the authorizing provider or a provider within the authorizing providers department within the previous 12 mos or has a future within next 30 days. See \"Patient Info\" tab in inbasket, or \"Choose Columns\" in Meds & Orders section of the refill encounter.              Passed - Medication is active on med list        Passed - Patient is age 18 or older               Prescription approved per [facility] [place] Refill Protocol.      Mari Mccormack RN on 12/4/2020 at 3:49 PM       "

## 2020-12-09 ENCOUNTER — ALLIED HEALTH/NURSE VISIT (OUTPATIENT)
Dept: FAMILY MEDICINE | Facility: OTHER | Age: 75
End: 2020-12-09
Attending: INTERNAL MEDICINE
Payer: MEDICARE

## 2020-12-09 DIAGNOSIS — Z23 NEED FOR PROPHYLACTIC VACCINATION AND INOCULATION AGAINST INFLUENZA: Primary | ICD-10-CM

## 2020-12-09 PROCEDURE — 90662 IIV NO PRSV INCREASED AG IM: CPT

## 2020-12-09 NOTE — PROGRESS NOTES
Immunization Documentation    Was entire vial of medication used? Yes  Vial/Syringe: Syringe    Anita Christianson [facility]  12/9/2020   11:05 AM

## 2020-12-13 ENCOUNTER — HEALTH MAINTENANCE LETTER (OUTPATIENT)
Age: 75
End: 2020-12-13

## 2021-01-06 ENCOUNTER — MYC MEDICAL ADVICE (OUTPATIENT)
Dept: INTERNAL MEDICINE | Facility: OTHER | Age: 76
End: 2021-01-06

## 2021-01-25 DIAGNOSIS — E11.319 TYPE 2 DIABETES MELLITUS WITH RETINOPATHY OF LEFT EYE, WITHOUT LONG-TERM CURRENT USE OF INSULIN, MACULAR EDEMA PRESENCE UNSPECIFIED, UNSPECIFIED RETINOPATHY SEVERITY (H): ICD-10-CM

## 2021-01-25 NOTE — LETTER
January 26, 2021      Trevor Alexandre  [address] [address]        Dear Trevor,     A refill request was received from your pharmacy for Glipizide.    Additional refills require an office visit with Dr. Gavin for diabetic follow up and labs.    Please call [phone] to schedule appointment.          Sincerely,      Refill Nurse

## 2021-01-26 RX ORDER — GLIPIZIDE 10 MG/1
TABLET ORAL
Qty: 180 TABLET | Refills: 0 | Status: SHIPPED | OUTPATIENT
Start: 2021-01-26 | End: 2021-05-03

## 2021-01-26 NOTE — TELEPHONE ENCOUNTER
Prescription approved per [facility] Refill Protocol.  LOV and labs: 6/23/2020    Patient due for diabetic follow up and labs.  Letter and my chart message sent.    Shameka Santizo RN on 1/26/2021 at 9:34 AM

## 2021-04-17 ENCOUNTER — HEALTH MAINTENANCE LETTER (OUTPATIENT)
Age: 76
End: 2021-04-17

## 2021-05-03 DIAGNOSIS — E11.319 TYPE 2 DIABETES MELLITUS WITH RETINOPATHY OF LEFT EYE, WITHOUT LONG-TERM CURRENT USE OF INSULIN, MACULAR EDEMA PRESENCE UNSPECIFIED, UNSPECIFIED RETINOPATHY SEVERITY (H): ICD-10-CM

## 2021-05-03 RX ORDER — GLIPIZIDE 10 MG/1
TABLET ORAL
Qty: 180 TABLET | Refills: 0 | Status: SHIPPED | OUTPATIENT
Start: 2021-05-03 | End: 2021-07-26

## 2021-05-03 NOTE — TELEPHONE ENCOUNTER
Routing refill request to provider for review/approval because:  Patient needs to be seen because:  Patient was to follow 6 months from 6/3/2020  Called and informed patient due for diabetic follow up and labs and transferred to scheduling.      Shameka Santizo RN on 5/3/2021 at 3:46 PM

## 2021-05-04 ENCOUNTER — OFFICE VISIT (OUTPATIENT)
Dept: INTERNAL MEDICINE | Facility: OTHER | Age: 76
End: 2021-05-04
Attending: INTERNAL MEDICINE
Payer: MEDICARE

## 2021-05-04 VITALS
OXYGEN SATURATION: 96 % | WEIGHT: 208 LBS | SYSTOLIC BLOOD PRESSURE: 128 MMHG | TEMPERATURE: 97.1 F | DIASTOLIC BLOOD PRESSURE: 70 MMHG | RESPIRATION RATE: 16 BRPM | HEART RATE: 74 BPM | BODY MASS INDEX: 30.42 KG/M2

## 2021-05-04 DIAGNOSIS — E78.5 HYPERLIPIDEMIA LDL GOAL <100: ICD-10-CM

## 2021-05-04 DIAGNOSIS — L72.3 SEBACEOUS CYST: ICD-10-CM

## 2021-05-04 DIAGNOSIS — I10 BENIGN ESSENTIAL HYPERTENSION: ICD-10-CM

## 2021-05-04 DIAGNOSIS — J30.9 ALLERGIC RHINITIS, UNSPECIFIED SEASONALITY, UNSPECIFIED TRIGGER: ICD-10-CM

## 2021-05-04 DIAGNOSIS — E11.319 TYPE 2 DIABETES MELLITUS WITH RETINOPATHY OF LEFT EYE, WITHOUT LONG-TERM CURRENT USE OF INSULIN, MACULAR EDEMA PRESENCE UNSPECIFIED, UNSPECIFIED RETINOPATHY SEVERITY (H): Primary | ICD-10-CM

## 2021-05-04 DIAGNOSIS — Z86.0101 HX OF ADENOMATOUS COLONIC POLYPS: ICD-10-CM

## 2021-05-04 LAB
ALBUMIN SERPL-MCNC: 4.5 G/DL (ref 3.5–5.7)
ALP SERPL-CCNC: 58 U/L (ref 34–104)
ALT SERPL W P-5'-P-CCNC: 20 U/L (ref 7–52)
ANION GAP SERPL CALCULATED.3IONS-SCNC: 9 MMOL/L (ref 3–14)
AST SERPL W P-5'-P-CCNC: 18 U/L (ref 13–39)
BILIRUB SERPL-MCNC: 1 MG/DL (ref 0.3–1)
BUN SERPL-MCNC: 18 MG/DL (ref 7–25)
CALCIUM SERPL-MCNC: 9.7 MG/DL (ref 8.6–10.3)
CHLORIDE SERPL-SCNC: 105 MMOL/L (ref 98–107)
CHOLEST SERPL-MCNC: 101 MG/DL
CO2 SERPL-SCNC: 26 MMOL/L (ref 21–31)
CREAT SERPL-MCNC: 0.9 MG/DL (ref 0.7–1.3)
GFR SERPL CREATININE-BSD FRML MDRD: 82 ML/MIN/{1.73_M2}
GLUCOSE SERPL-MCNC: 114 MG/DL (ref 70–105)
HBA1C MFR BLD: 6.6 % (ref 4–6)
HDLC SERPL-MCNC: 38 MG/DL (ref 23–92)
LDLC SERPL CALC-MCNC: 44 MG/DL
NONHDLC SERPL-MCNC: 63 MG/DL
POTASSIUM SERPL-SCNC: 4.1 MMOL/L (ref 3.5–5.1)
PROT SERPL-MCNC: 7.6 G/DL (ref 6.4–8.9)
SODIUM SERPL-SCNC: 140 MMOL/L (ref 134–144)
TRIGL SERPL-MCNC: 96 MG/DL
TSH SERPL DL<=0.05 MIU/L-ACNC: 0.89 IU/ML (ref 0.34–5.6)

## 2021-05-04 PROCEDURE — 36415 COLL VENOUS BLD VENIPUNCTURE: CPT | Mod: ZL | Performed by: INTERNAL MEDICINE

## 2021-05-04 PROCEDURE — G0463 HOSPITAL OUTPT CLINIC VISIT: HCPCS | Mod: 25

## 2021-05-04 PROCEDURE — 80061 LIPID PANEL: CPT | Mod: ZL | Performed by: INTERNAL MEDICINE

## 2021-05-04 PROCEDURE — 84443 ASSAY THYROID STIM HORMONE: CPT | Mod: ZL | Performed by: INTERNAL MEDICINE

## 2021-05-04 PROCEDURE — 80053 COMPREHEN METABOLIC PANEL: CPT | Mod: ZL | Performed by: INTERNAL MEDICINE

## 2021-05-04 PROCEDURE — 99213 OFFICE O/P EST LOW 20 MIN: CPT | Mod: 25 | Performed by: INTERNAL MEDICINE

## 2021-05-04 PROCEDURE — G0009 ADMIN PNEUMOCOCCAL VACCINE: HCPCS

## 2021-05-04 PROCEDURE — G0439 PPPS, SUBSEQ VISIT: HCPCS | Performed by: INTERNAL MEDICINE

## 2021-05-04 PROCEDURE — 83036 HEMOGLOBIN GLYCOSYLATED A1C: CPT | Mod: ZL | Performed by: INTERNAL MEDICINE

## 2021-05-04 RX ORDER — IPRATROPIUM BROMIDE 42 UG/1
2 SPRAY, METERED NASAL 4 TIMES DAILY
Qty: 45 ML | Refills: 4 | Status: SHIPPED | OUTPATIENT
Start: 2021-05-04 | End: 2021-06-04

## 2021-05-04 ASSESSMENT — PAIN SCALES - GENERAL: PAINLEVEL: NO PAIN (0)

## 2021-05-04 NOTE — PATIENT INSTRUCTIONS
Continue your current medications without change.    Try the ipratropium nasal spray.    Pneumonia shot today.    Colonoscopy will be due next year.    Consider shingles vaccine and tetanus update.    Lab work today, I will send you the results with recommendations for your medications and follow-up recommendations.    Surgery appointment for the sebaceous cyst on your back.

## 2021-05-04 NOTE — NURSING NOTE
Chief Complaint   Patient presents with     Diabetes     Medicare Visit     Patient presents to the clinic today for a DM check and Medicare Wellness visit  Medication Reconciliation: completed   Vicki Herndon LPN  5/4/2021 3:45 PM

## 2021-05-04 NOTE — LETTER
May 5, 2021      Trevor Alexandre  [address] [place] [address]        Dear Irving,    Below are the results of your recent labs:    Results for orders placed or performed in visit on 05/04/21   TSH     Status: None   Result Value Ref Range    Thyrotropin 0.89 0.34 - 5.60 IU/mL   Hemoglobin A1c     Status: Abnormal   Result Value Ref Range    Hemoglobin A1C 6.6 (H) 4.0 - 6.0 %   Lipid Profile     Status: None   Result Value Ref Range    Cholesterol 101 <200 mg/dL    Triglycerides 96 <150 mg/dL    HDL Cholesterol 38 23 - 92 mg/dL    LDL Cholesterol Calculated 44 <100 mg/dL    Non HDL Cholesterol 63 <130 mg/dL   Comprehensive metabolic panel     Status: Abnormal   Result Value Ref Range    Sodium 140 134 - 144 mmol/L    Potassium 4.1 3.5 - 5.1 mmol/L    Chloride 105 98 - 107 mmol/L    Carbon Dioxide 26 21 - 31 mmol/L    Anion Gap 9 3 - 14 mmol/L    Glucose 114 (H) 70 - 105 mg/dL    Urea Nitrogen 18 7 - 25 mg/dL    Creatinine 0.90 0.70 - 1.30 mg/dL    GFR Estimate 82 >60 mL/min/[1.73_m2]    GFR Estimate If Black >90 >60 mL/min/[1.73_m2]    Calcium 9.7 8.6 - 10.3 mg/dL    Bilirubin Total 1.0 0.3 - 1.0 mg/dL    Albumin 4.5 3.5 - 5.7 g/dL    Protein Total 7.6 6.4 - 8.9 g/dL    Alkaline Phosphatase 58 34 - 104 U/L    ALT 20 7 - 52 U/L    AST 18 13 - 39 U/L        Your blood tests look very good.  Congratulations on this report.  I would recommend a recheck of your diabetes in 6 months.    Sincerely,        Landen Gavin MD  Internal Medicine  [facility]

## 2021-05-04 NOTE — PROGRESS NOTES
SUBJECTIVE:   Trevor Alexandre is a 75 year old male who presents for Preventive Visit.       Patient has been advised of split billing requirements and indicates understanding: Yes   Are you in the first 12 months of your Medicare coverage?  No    HPI  Do you feel safe in your environment? Yes    Have you ever done Advance Care Planning? (For example, a Health Directive, POLST, or a discussion with a medical provider or your loved ones about your wishes): No, advance care planning information given to patient to review.  Patient declined advance care planning discussion at this time.       Fall risk  Fallen 2 or more times in the past year?: No  Any fall with injury in the past year?: No    Cognitive Screening   1) Repeat 3 items (Leader, Season, Table)    2) Clock draw: NORMAL  3) 3 item recall: Recalls NO objects   Results: 0 items recalled: PROBABLE COGNITIVE IMPAIRMENT, **INFORM PROVIDER**    Mini-CogTM Copyright S [employer]. Licensed by the author for use in [facility]; reprinted with permission (ingrid@[facility]). All rights reserved.      Do you have sleep apnea, excessive snoring or daytime drowsiness?: no    Reviewed and updated as needed this visit by clinical staff  Tobacco  Allergies  Meds  Problems  Med Hx  Surg Hx  Fam Hx          Reviewed and updated as needed this visit by Provider  Tobacco  Allergies  Meds  Problems  Med Hx  Surg Hx  Fam Hx         Social History     Tobacco Use     Smoking status: Former Smoker     Types: Cigarettes     Quit date: 3/27/2002     Years since quittin.1     Smokeless tobacco: Never Used   Substance Use Topics     Alcohol use: Yes     Frequency: 2-4 times a month     Drinks per session: 1 or 2     Comment: occasionally         No flowsheet data found.            Current providers sharing in care for this patient include:   Patient Care Team:  Landen Gavin MD as PCP - General (Internal Medicine)  Landen Gavin MD as Assigned PCP    The  "following health maintenance items are reviewed in Epic and correct as of today:  Health Maintenance Due   Topic Date Due     DTAP/TDAP/TD IMMUNIZATION (1 - Tdap) Never done     ZOSTER IMMUNIZATION (1 of 2) Never done     A1C  12/03/2020     BMP  06/03/2021     LIPID  06/03/2021     Lab work is in process  Pneumonia Vaccine:Adults age 65+ who received Pneumovax (PPSV23) at 65 years or older: Should be given PCV13 > 1 year after their most recent PPSV23      Review of Systems  Constitutional, HEENT, cardiovascular, pulmonary, GI, , musculoskeletal, neuro, skin, endocrine and psych systems are negative, except as otherwise noted.    OBJECTIVE:   /70 (BP Location: Right arm, Patient Position: Sitting, Cuff Size: Adult Large)   Pulse 74   Temp 97.1  F (36.2  C) (Tympanic)   Resp 16   Wt 94.3 kg (208 lb)   SpO2 96%   BMI 30.42 kg/m   Estimated body mass index is 30.42 kg/m  as calculated from the following:    Height as of 6/3/20: 1.761 m (5' 9.33\").    Weight as of this encounter: 94.3 kg (208 lb).  Physical Exam  GENERAL: healthy, alert and no distress  EYES: Eyes grossly normal to inspection, PERRL and conjunctivae and sclerae normal  HENT: ear canals and TM's normal, nose and mouth without ulcers or lesions  NECK: no adenopathy, no asymmetry, masses, or scars and thyroid normal to palpation  RESP: lungs clear to auscultation - no rales, rhonchi or wheezes  CV: regular rate and rhythm, normal S1 S2, no S3 or S4, no murmur, click or rub, no peripheral edema and peripheral pulses strong  ABDOMEN: soft, nontender, no hepatosplenomegaly, no masses and bowel sounds normal   (male): normal male genitalia without lesions or urethral discharge, no hernia  RECTAL: normal sphincter tone, no rectal masses, prostate normal size, smooth, nontender without nodules or masses  MS: no gross musculoskeletal defects noted, no edema  SKIN: no suspicious lesions or rashes.  He does have a sebaceous cyst on his back that he " "is requesting removal of.  NEURO: Normal strength and tone, mentation intact and speech normal  PSYCH: mentation appears normal, affect normal/bright        ASSESSMENT / PLAN:       ICD-10-CM    1. 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 Allergic rhinitis, unspecified seasonality, unspecified trigger  J30.9 ipratropium (ATROVENT) 0.06 % nasal spray   3. Benign essential hypertension  I10    4. Hx of adenomatous colonic polyps  Z86.010    5. Hyperlipidemia LDL goal <100  E78.5    6. Sebaceous cyst  L72.3 GENERAL SURG ADULT REFERRAL     Overall he appears to be doing quite well.  He will continue with the same medications.  I also gave him a prescription for Atrovent nasal spray to try per his request.  Complete lab drawn and pending, I will send him a letter with the results with recommendations regarding any changes as well as when to follow-up.  Pneumovax given today.  Suggested that he look into getting the Shingrix vaccine and getting updated with tetanus.  Colonoscopy will be due next year.  Appointment with general surgery to have the sebaceous cyst removed.      Patient has been advised of split billing requirements and indicates understanding: Yes  COUNSELING:  Reviewed preventive health counseling, as reflected in patient instructions       Regular exercise       Healthy diet/nutrition       Vision screening    Estimated body mass index is 30.42 kg/m  as calculated from the following:    Height as of 6/3/20: 1.761 m (5' 9.33\").    Weight as of this encounter: 94.3 kg (208 lb).    Weight management plan: Discussed healthy diet and exercise guidelines    He reports that he quit smoking about 19 years ago. His smoking use included cigarettes. He has never used smokeless tobacco.      Appropriate " preventive services were discussed with this patient, including applicable screening as appropriate for cardiovascular disease, diabetes, osteopenia/osteoporosis, and glaucoma.  As appropriate for age/gender, discussed screening for colorectal cancer, prostate cancer, breast cancer, and cervical cancer. Checklist reviewing preventive services available has been given to the patient.    Reviewed patients plan of care and provided an AVS. The Basic Care Plan (routine screening as documented in Health Maintenance) for Trevor meets the Care Plan requirement. This Care Plan has been established and reviewed with the Patient.    Counseling Resources:  ATP IV Guidelines  Pooled Cohorts Equation Calculator  Breast Cancer Risk Calculator  Breast Cancer: Medication to Reduce Risk  FRAX Risk Assessment  ICSI Preventive Guidelines  Dietary Guidelines for Americans, 2010  USDA's MyPlate  ASA Prophylaxis  Lung CA Screening    NAYA CEDEÑO MD  [facility]    Identified Health Risks:

## 2021-06-04 ENCOUNTER — OFFICE VISIT (OUTPATIENT)
Dept: SURGERY | Facility: OTHER | Age: 76
End: 2021-06-04
Attending: INTERNAL MEDICINE
Payer: MEDICARE

## 2021-06-04 VITALS
RESPIRATION RATE: 16 BRPM | TEMPERATURE: 99.4 F | WEIGHT: 207.2 LBS | SYSTOLIC BLOOD PRESSURE: 132 MMHG | HEIGHT: 69 IN | OXYGEN SATURATION: 96 % | HEART RATE: 76 BPM | DIASTOLIC BLOOD PRESSURE: 68 MMHG | BODY MASS INDEX: 30.69 KG/M2

## 2021-06-04 DIAGNOSIS — L72.3 SEBACEOUS CYST: ICD-10-CM

## 2021-06-04 PROCEDURE — G0463 HOSPITAL OUTPT CLINIC VISIT: HCPCS

## 2021-06-04 PROCEDURE — 11402 EXC TR-EXT B9+MARG 1.1-2 CM: CPT | Performed by: SURGERY

## 2021-06-04 PROCEDURE — 12032 INTMD RPR S/A/T/EXT 2.6-7.5: CPT | Performed by: SURGERY

## 2021-06-04 ASSESSMENT — PAIN SCALES - GENERAL: PAINLEVEL: NO PAIN (0)

## 2021-06-04 ASSESSMENT — MIFFLIN-ST. JEOR: SCORE: 1665.23

## 2021-06-04 NOTE — NURSING NOTE
"Chief Complaint   Patient presents with     Procedure     cyst       Initial /68 (BP Location: Right arm, Patient Position: Sitting, Cuff Size: Adult Regular)   Pulse 76   Temp 99.4  F (37.4  C) (Tympanic)   Resp 16   Ht 1.753 m (5' 9\")   Wt 94 kg (207 lb 3.2 oz)   SpO2 96%   BMI 30.60 kg/m   Estimated body mass index is 30.6 kg/m  as calculated from the following:    Height as of this encounter: 1.753 m (5' 9\").    Weight as of this encounter: 94 kg (207 lb 3.2 oz).  Medication Reconciliation: Completed     Prior to the start of the procedure and with procedural staff participation, I verbally confirmed the patient s identity using two indicators, relevant allergies, that the procedure was appropriate and matched the consent or emergent situation, and that the correct equipment/implants were available. Immediately prior to starting the procedure I conducted the Time Out with the procedural staff and re-confirmed the patient s name, procedure, and site/side. (The Joint Commission universal protocol was followed.)  Yes    Sedation (Moderate or Deep): None      Shilpi Vang LPN  "

## 2021-06-04 NOTE — PROGRESS NOTES
Procedure Note      Pre/Post Operative Diagnosis:   Back sebaceous cyst     Procedure:   Removal of  Back sebaceous cyst      Surgeon: Lucius Elise MD    Local Anesthesia: 1% lidocaine with 0.25% Marcaine with epinephrine    Indication for the procedure:  This is a 75 year old male  patient referred by Landen Gavin with a  Back sebaceous cyst .       After explaining the risks to include bleeding, infection, recurrence or need for reexcision, and scarring the patient wished to proceed.    Procedure:   The area was prepped and draped in usual sterile fashion with ChloraPrep.   After, adequate local anesthesia, an 4 cm X 1.5 cm elliptical skin incision was made to encompass the 2 cm subcutaneous cyst.  The deep tissues were closed with 3-0 vicryl.  The skin was closed with 4-0 Monocryl and Dermabond.      Plan:  The patient will be called to review the pathology. Patient will follow up if there any problems with the wound including redness or drainage.      Lucius Elise MD....................  6/4/2021   2:55 PM

## 2021-06-21 DIAGNOSIS — E78.5 HYPERLIPIDEMIA LDL GOAL <100: ICD-10-CM

## 2021-06-21 RX ORDER — ATORVASTATIN CALCIUM 40 MG/1
TABLET, FILM COATED ORAL
Qty: 90 TABLET | Refills: 0 | Status: SHIPPED | OUTPATIENT
Start: 2021-06-21 | End: 2021-10-14

## 2021-07-15 ENCOUNTER — MYC MEDICAL ADVICE (OUTPATIENT)
Dept: INTERNAL MEDICINE | Facility: OTHER | Age: 76
End: 2021-07-15

## 2021-07-15 NOTE — TELEPHONE ENCOUNTER
Opioids are not appropriate therapy for arthritis pain.  There are potentially other medications that can help that are nonopioid.  Suggest an office visit if he would like to explore other options.

## 2021-07-24 DIAGNOSIS — E11.319 TYPE 2 DIABETES MELLITUS WITH RETINOPATHY OF LEFT EYE, WITHOUT LONG-TERM CURRENT USE OF INSULIN, MACULAR EDEMA PRESENCE UNSPECIFIED, UNSPECIFIED RETINOPATHY SEVERITY (H): ICD-10-CM

## 2021-07-26 RX ORDER — LISINOPRIL 40 MG/1
TABLET ORAL
Qty: 90 TABLET | Refills: 2 | Status: SHIPPED | OUTPATIENT
Start: 2021-07-26 | End: 2022-02-03

## 2021-07-26 RX ORDER — GLIPIZIDE 10 MG/1
TABLET ORAL
Qty: 180 TABLET | Refills: 2 | Status: SHIPPED | OUTPATIENT
Start: 2021-07-26 | End: 2022-02-03

## 2021-07-26 NOTE — TELEPHONE ENCOUNTER
Routing refill request to provider for review/approval because:  LOV and labs: 5/4/2021    Shameka Santizo RN on 7/26/2021 at 3:01 PM

## 2021-09-11 DIAGNOSIS — E11.319 TYPE 2 DIABETES MELLITUS WITH RETINOPATHY OF LEFT EYE, WITHOUT LONG-TERM CURRENT USE OF INSULIN, MACULAR EDEMA PRESENCE UNSPECIFIED, UNSPECIFIED RETINOPATHY SEVERITY (H): ICD-10-CM

## 2021-09-14 RX ORDER — METFORMIN HCL 500 MG
TABLET, EXTENDED RELEASE 24 HR ORAL
Qty: 180 TABLET | Refills: 0 | Status: SHIPPED | OUTPATIENT
Start: 2021-09-14 | End: 2021-12-06

## 2021-09-14 NOTE — TELEPHONE ENCOUNTER
"[facility] [address] [place] [address] sent Rx request for the following:      Requested Prescriptions   Pending Prescriptions Disp Refills     metFORMIN (GLUCOPHAGE-XR) 500 MG 24 hr tablet [Pharmacy Med Name: metFORMIN HCl  MG Oral Tablet Extended Release 24 Hour] 180 tablet 0     Sig: Take 2 tablets by mouth once daily       Biguanide Agents Passed - 9/11/2021  7:05 AM        Passed - Patient is age 10 or older        Passed - Patient has documented A1c within the specified period of time.     If HgbA1C is 8 or greater, it needs to be on file within the past 3 months.  If less than 8, must be on file within the past 6 months.     Recent Labs   Lab Test 05/04/21  1625   A1C 6.6*             Passed - Patient's CR is NOT>1.4 OR Patient's EGFR is NOT<45 within past 12 mos.     Recent Labs   Lab Test 05/04/21  1625   GFRESTIMATED 82   GFRESTBLACK >90       Recent Labs   Lab Test 05/04/21  1625   CR 0.90             Passed - Patient does NOT have a diagnosis of CHF.        Passed - Medication is active on med list        Passed - Recent (6 mo) or future (30 days) visit within the authorizing provider's specialty     Patient had office visit in the last 6 months or has a visit in the next 30 days with authorizing provider or within the authorizing provider's specialty.  See \"Patient Info\" tab in inbasket, or \"Choose Columns\" in Meds & Orders section of the refill encounter.         Last Prescription Date:   6/24/20  Last Fill Qty/Refills:         360, R-3  Last Office Visit:              05/04/21  Future Office visit:           None  Routing refill request to provider for review/approval because:  Unable to complete prescription refill per RN Medication Refill Policy. Yadira Caputo RN .............. 9/14/2021  9:11 AM  "

## 2021-09-26 ENCOUNTER — HEALTH MAINTENANCE LETTER (OUTPATIENT)
Age: 76
End: 2021-09-26

## 2021-10-13 DIAGNOSIS — E78.5 HYPERLIPIDEMIA LDL GOAL <100: ICD-10-CM

## 2021-10-14 RX ORDER — ATORVASTATIN CALCIUM 40 MG/1
TABLET, FILM COATED ORAL
Qty: 90 TABLET | Refills: 0 | Status: SHIPPED | OUTPATIENT
Start: 2021-10-14 | End: 2022-02-03

## 2021-11-09 ENCOUNTER — PATIENT OUTREACH (OUTPATIENT)
Dept: INTERNAL MEDICINE | Facility: OTHER | Age: 76
End: 2021-11-09
Payer: MEDICARE

## 2021-11-09 NOTE — TELEPHONE ENCOUNTER
Patient Quality Outreach      Summary:    Patient has the following on his problem list/HM:     Diabetes    Last A1C:  Lab Results   Component Value Date    A1C 6.6 2021    A1C 6.0 2020       Last LDL:    Lab Results   Component Value Date    LDL 44 2021       Is the patient on a Statin? Yes          Is the patient on Aspirin? No    Medications     HMG CoA Reductase Inhibitors     atorvastatin (LIPITOR) 40 MG tablet             Last three blood pressure readings:  BP Readings from Last 3 Encounters:   21 132/68   21 128/70   20 136/70          Tobacco Use      Smoking status: Former Smoker        Types: Cigarettes        Quit date: 3/27/2002        Years since quittin.6      Smokeless tobacco: Never Used          Patient is due/failing the following:   Diabetes -  Eye Exam    Type of outreach:    Sent [employer] message.    Questions for provider review:    None                                                                                                                                     Janette Ramírez LPN .............2021  1:45 PM       Chart routed to .

## 2021-11-21 ENCOUNTER — HEALTH MAINTENANCE LETTER (OUTPATIENT)
Age: 76
End: 2021-11-21

## 2021-12-04 DIAGNOSIS — E11.319 TYPE 2 DIABETES MELLITUS WITH RETINOPATHY OF LEFT EYE, WITHOUT LONG-TERM CURRENT USE OF INSULIN, MACULAR EDEMA PRESENCE UNSPECIFIED, UNSPECIFIED RETINOPATHY SEVERITY (H): ICD-10-CM

## 2021-12-06 RX ORDER — METFORMIN HCL 500 MG
TABLET, EXTENDED RELEASE 24 HR ORAL
Qty: 180 TABLET | Refills: 0 | Status: SHIPPED | OUTPATIENT
Start: 2021-12-06 | End: 2022-02-03

## 2021-12-06 NOTE — TELEPHONE ENCOUNTER
Notified patient of Metformin RX being approved, patient transferred to scheduling to make a diabetic follow up.  Kavon Funes LPN on 12/6/2021 at 4:02 PM

## 2022-01-20 DIAGNOSIS — E11.319 TYPE 2 DIABETES MELLITUS WITH RETINOPATHY OF LEFT EYE, WITHOUT LONG-TERM CURRENT USE OF INSULIN, MACULAR EDEMA PRESENCE UNSPECIFIED, UNSPECIFIED RETINOPATHY SEVERITY (H): ICD-10-CM

## 2022-01-21 ENCOUNTER — MYC MEDICAL ADVICE (OUTPATIENT)
Dept: INTERNAL MEDICINE | Facility: OTHER | Age: 77
End: 2022-01-21
Payer: MEDICARE

## 2022-01-21 DIAGNOSIS — E11.319 TYPE 2 DIABETES MELLITUS WITH RETINOPATHY OF LEFT EYE, WITHOUT LONG-TERM CURRENT USE OF INSULIN, MACULAR EDEMA PRESENCE UNSPECIFIED, UNSPECIFIED RETINOPATHY SEVERITY (H): ICD-10-CM

## 2022-01-21 RX ORDER — EMPAGLIFLOZIN 25 MG/1
TABLET, FILM COATED ORAL
Qty: 90 TABLET | Refills: 0 | Status: SHIPPED | OUTPATIENT
Start: 2022-01-21 | End: 2022-01-24

## 2022-01-21 NOTE — TELEPHONE ENCOUNTER
Routing refill request to provider for review/approval because:  Sodium Glucose Co-Transport Inhibitor Agents Failed 01/20/2022 03:11 PM   Protocol Details  Patient has documented A1c within the specified period of time.    Recent (6 mo) or future (30 days) visit within the authorizing provider's specialty     LOV: 5/4/2021  Patient due for diabetic follow up and labs.    My chart message sent  Dr. Gavin out of office will route to covering team let for review and consideration.  Shameka Santizo RN on 1/21/2022 at 10:23 AM

## 2022-02-03 ENCOUNTER — OFFICE VISIT (OUTPATIENT)
Dept: INTERNAL MEDICINE | Facility: OTHER | Age: 77
End: 2022-02-03
Attending: INTERNAL MEDICINE
Payer: MEDICARE

## 2022-02-03 VITALS
RESPIRATION RATE: 18 BRPM | SYSTOLIC BLOOD PRESSURE: 134 MMHG | WEIGHT: 208.6 LBS | HEART RATE: 80 BPM | BODY MASS INDEX: 30.8 KG/M2 | OXYGEN SATURATION: 98 % | DIASTOLIC BLOOD PRESSURE: 72 MMHG | TEMPERATURE: 97.6 F

## 2022-02-03 DIAGNOSIS — E78.5 HYPERLIPIDEMIA LDL GOAL <100: ICD-10-CM

## 2022-02-03 DIAGNOSIS — E11.319 TYPE 2 DIABETES MELLITUS WITH RETINOPATHY OF LEFT EYE, WITHOUT LONG-TERM CURRENT USE OF INSULIN, MACULAR EDEMA PRESENCE UNSPECIFIED, UNSPECIFIED RETINOPATHY SEVERITY (H): Primary | ICD-10-CM

## 2022-02-03 LAB — HBA1C MFR BLD: 6.6 % (ref 4–6.2)

## 2022-02-03 PROCEDURE — G0463 HOSPITAL OUTPT CLINIC VISIT: HCPCS

## 2022-02-03 PROCEDURE — 99213 OFFICE O/P EST LOW 20 MIN: CPT | Performed by: INTERNAL MEDICINE

## 2022-02-03 PROCEDURE — 36415 COLL VENOUS BLD VENIPUNCTURE: CPT | Mod: ZL | Performed by: INTERNAL MEDICINE

## 2022-02-03 PROCEDURE — 83036 HEMOGLOBIN GLYCOSYLATED A1C: CPT | Mod: ZL | Performed by: INTERNAL MEDICINE

## 2022-02-03 RX ORDER — METFORMIN HCL 500 MG
500 TABLET, EXTENDED RELEASE 24 HR ORAL 2 TIMES DAILY WITH MEALS
Qty: 180 TABLET | Refills: 3 | Status: SHIPPED | OUTPATIENT
Start: 2022-02-03 | End: 2023-03-03

## 2022-02-03 RX ORDER — LISINOPRIL 40 MG/1
40 TABLET ORAL DAILY
Qty: 90 TABLET | Refills: 3 | Status: SHIPPED | OUTPATIENT
Start: 2022-02-03 | End: 2023-03-03

## 2022-02-03 RX ORDER — ATORVASTATIN CALCIUM 40 MG/1
40 TABLET, FILM COATED ORAL DAILY
Qty: 90 TABLET | Refills: 3 | Status: SHIPPED | OUTPATIENT
Start: 2022-02-03 | End: 2023-03-03

## 2022-02-03 RX ORDER — GLIPIZIDE 10 MG/1
10 TABLET ORAL
Qty: 180 TABLET | Refills: 3 | Status: SHIPPED | OUTPATIENT
Start: 2022-02-03 | End: 2023-03-03

## 2022-02-03 ASSESSMENT — ENCOUNTER SYMPTOMS
ENDOCRINE NEGATIVE: 1
ALLERGIC/IMMUNOLOGIC NEGATIVE: 1
EYES NEGATIVE: 1
CONSTITUTIONAL NEGATIVE: 1

## 2022-02-03 ASSESSMENT — PAIN SCALES - GENERAL: PAINLEVEL: MODERATE PAIN (4)

## 2022-02-03 NOTE — NURSING NOTE
"Chief Complaint   Patient presents with     Medication Refill       Initial /72   Pulse 80   Temp 97.6  F (36.4  C) (Tympanic)   Resp 18   Wt 94.6 kg (208 lb 9.6 oz)   SpO2 98%   BMI 30.80 kg/m   Estimated body mass index is 30.8 kg/m  as calculated from the following:    Height as of 6/4/21: 1.753 m (5' 9\").    Weight as of this encounter: 94.6 kg (208 lb 9.6 oz).  FOOD SECURITY SCREENING QUESTIONS  Hunger Vital Signs:  Within the past 12 months we worried whether our food would run out before we got money to buy more. Never  Within the past 12 months the food we bought just didn't last and we didn't have money to get more. Never        Jason Marie, SAM    "

## 2022-02-03 NOTE — PROGRESS NOTES
Chief Complaint:  Recheck on DM.    HPI: He is here for recheck on his diabetes.  In general, he is really doing quite well.  Historically, his diabetes is very well controlled.  He needs refills on all of his medications.  He has been having a lot of trouble with arthritis all winter but is excited to be planning a trip to [place] for 11 days forthcoming, assuming that Covid does not interfere with the plans.  No other complaints or concerns.  [place] experienced improvement when he went to [place], etc.    Past Medical History:   Diagnosis Date     Abscess of back 03/23/2017     Diabetes mellitus, type 2 (H)      Diabetic retinopathy (H)      Hx of adenomatous colonic polyps      Hyperlipidemia      Hypertension      Inflammatory arthritis        Past Surgical History:   Procedure Laterality Date     ARTHROSCOPY SHOULDER RT/LT Bilateral      CHOLECYSTECTOMY       COLONOSCOPY  12/02/2014    Adenomatous polyps     COLONOSCOPY N/A 5/8/2019    adenomas, follow up in 2022     EXAM UNDER ANESTHESIA, CHANGE DRESSING (LOCATION), COMBINED N/A 3/24/2017    Procedure: COMBINED EXAM UNDER ANESTHESIA, CHANGE DRESSING (LOCATION);  Surgeon: Tahir De Jesus DO;  Location: [facility] OR     EXCISE LESION BACK N/A 3/22/2017    Procedure: EXCISE LESION BACK;  Surgeon: Servando Dale MD;  Location: [facility] OR     FINGER SURGERY Right     Middle     HEMORRHOIDECTOMY BANDING N/A 5/8/2019    Procedure: Hemorrhoid banding;  Surgeon: Velvet Lees MD;  Location: [facility] OR     IRRIGATION AND DEBRIDEMENT TRUNK, COMBINED N/A 3/23/2017    Procedure: COMBINED IRRIGATION AND DEBRIDEMENT TRUNK;  Surgeon: Servando Dale MD;  Location: [facility] OR       No Known Allergies    Current Outpatient Medications   Medication Sig Dispense Refill     atorvastatin (LIPITOR) 40 MG tablet Take 1 tablet (40 mg) by mouth daily 90 tablet 3     empagliflozin (JARDIANCE) 25 MG TABS tablet Take 1 tablet (25  mg) by mouth daily 90 tablet 3     glipiZIDE (GLUCOTROL) 10 MG tablet Take 1 tablet (10 mg) by mouth 2 times daily (before meals) 180 tablet 3     lisinopril (ZESTRIL) 40 MG tablet Take 1 tablet (40 mg) by mouth daily 90 tablet 3     metFORMIN (GLUCOPHAGE-XR) 500 MG 24 hr tablet Take 1 tablet (500 mg) by mouth 2 times daily (with meals) 180 tablet 3       Review of Systems   Constitutional: Negative.    Eyes: Negative.    Endocrine: Negative.    Allergic/Immunologic: Negative.        Physical Exam  Vitals and nursing note reviewed.   Constitutional:       General: He is not in acute distress.     Appearance: Normal appearance. He is not ill-appearing, toxic-appearing or diaphoretic.   Neurological:      Mental Status: He is alert.         Assessment:        ICD-10-CM    1. Hyperlipidemia LDL goal <100  E78.5 atorvastatin (LIPITOR) 40 MG tablet   2. 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  I will likely have him back in 6 months for a Medicare wellness physical, assuming all goes well.

## 2022-02-03 NOTE — LETTER
February 7, 2022      Trevor LAZO Baldomero  [address] [place] [address]        Dear Irving,    Below are the results of your recent labs:    Results for orders placed or performed in visit on 02/03/22   Hemoglobin A1c     Status: Abnormal   Result Value Ref Range    Hemoglobin A1C 6.6 (H) 4.0 - 6.2 %        Your diabetes test is slightly elevated but acceptable.  Make sure that you continue to follow a healthy diet and work on regular exercise to keep this as low as possible.  Assuming all goes well, I would recommend a Medicare wellness physical in 6 months.    Sincerely,        Landen Gavin MD  Internal Medicine  [facility]

## 2022-02-26 DIAGNOSIS — E11.319 TYPE 2 DIABETES MELLITUS WITH RETINOPATHY OF LEFT EYE, WITHOUT LONG-TERM CURRENT USE OF INSULIN, MACULAR EDEMA PRESENCE UNSPECIFIED, UNSPECIFIED RETINOPATHY SEVERITY (H): ICD-10-CM

## 2022-02-28 RX ORDER — METFORMIN HCL 500 MG
TABLET, EXTENDED RELEASE 24 HR ORAL
Qty: 180 TABLET | Refills: 0 | OUTPATIENT
Start: 2022-02-28

## 2022-02-28 NOTE — TELEPHONE ENCOUNTER
metFORMIN (GLUCOPHAGE-XR) 500 MG 24 hr tablet 180 tablet 3 2/3/2022  No   Sig - Route: Take 1 tablet (500 mg) by mouth 2 times daily (with meals) - Oral     To Erum Santizo RN on 2/28/2022 at 11:35 AM

## 2022-04-16 DIAGNOSIS — E11.319 TYPE 2 DIABETES MELLITUS WITH RETINOPATHY OF LEFT EYE, WITHOUT LONG-TERM CURRENT USE OF INSULIN, MACULAR EDEMA PRESENCE UNSPECIFIED, UNSPECIFIED RETINOPATHY SEVERITY (H): ICD-10-CM

## 2022-04-18 RX ORDER — EMPAGLIFLOZIN 25 MG/1
TABLET, FILM COATED ORAL
Qty: 90 TABLET | Refills: 0 | Status: SHIPPED | OUTPATIENT
Start: 2022-04-18 | End: 2022-07-13

## 2022-04-18 RX ORDER — LISINOPRIL 40 MG/1
TABLET ORAL
Qty: 90 TABLET | Refills: 0 | OUTPATIENT
Start: 2022-04-18

## 2022-04-18 NOTE — TELEPHONE ENCOUNTER
[id number] sent Rx request for the following:      Requested Prescriptions   Pending Prescriptions Disp Refills     lisinopril (ZESTRIL) 40 MG tablet [Pharmacy Med Name: Lisinopril 40 MG Oral Tablet] 90 tablet 0     Sig: Take 1 tablet by mouth once daily          Last Prescription Date:   2/3/2022  Last Fill Qty/Refills:         90, R-3        Unable to complete prescription refill per RN Medication Refill Policy. Redundant refill request refused: Too soon:      Amparo De Jesus RN on 4/18/2022 at 12:28 PM

## 2022-04-18 NOTE — TELEPHONE ENCOUNTER
[facility] sent Rx request for the following:      Requested Prescriptions   Pending Prescriptions Disp Refills     JARDIANCE 25 MG TABS tablet [Pharmacy Med Name: Jardiance 25 MG Oral Tablet] 90 tablet 0     Sig: Take 1 tablet by mouth once daily          Last Prescription Date:   4/18/2022  Last Fill Qty/Refills:         90, R-0    Last Office Visit:              2/3/2022   Future Office visit:           NONE    Unable to complete prescription refill per RN Medication Refill Policy.     MEDICATION HAS BEEN DISCONTINUED.     Amparo De Jesus RN on 4/18/2022 at 12:32 PM

## 2022-06-09 NOTE — ANESTHESIA CARE TRANSFER NOTE
Patient: Trevor Alexandre    Procedure(s):  COLONOSCOPY, WITH POLYPECTOMY AND BIOPSY  Hemorrhoid banding    Diagnosis: screening  Diagnosis Additional Information: No value filed.    Anesthesia Type:   MAC     Note:  Airway :Room Air  Patient transferred to:Phase II  Handoff Report: Identifed the Patient, Identified the Reponsible Provider, Reviewed the pertinent medical history, Discussed the surgical course, Reviewed Intra-OP anesthesia mangement and issues during anesthesia, Set expectations for post-procedure period and Allowed opportunity for questions and acknowledgement of understanding      Vitals: (Last set prior to Anesthesia Care Transfer)    CRNA VITALS  5/8/2019 0906 - 5/8/2019 0939      5/8/2019             Pulse:  61    Ht Rate:  61    SpO2:  99 %    Resp Rate (set):  10                Electronically Signed By: CHARIS Carey CRNA  May 8, 2019  9:39 AM   5

## 2022-07-03 ENCOUNTER — HEALTH MAINTENANCE LETTER (OUTPATIENT)
Age: 77
End: 2022-07-03

## 2022-08-28 ENCOUNTER — HEALTH MAINTENANCE LETTER (OUTPATIENT)
Age: 77
End: 2022-08-28

## 2022-09-28 ASSESSMENT — ENCOUNTER SYMPTOMS
NAUSEA: 0
MYALGIAS: 0
PARESTHESIAS: 0
HEMATOCHEZIA: 0
FREQUENCY: 0
ABDOMINAL PAIN: 0
NERVOUS/ANXIOUS: 0
DYSURIA: 0
JOINT SWELLING: 0
CHILLS: 0
WEAKNESS: 0
SORE THROAT: 0
HEARTBURN: 0
CONSTIPATION: 0
HEADACHES: 0
FEVER: 0
EYE PAIN: 0
COUGH: 0
SHORTNESS OF BREATH: 0
DIARRHEA: 0
HEMATURIA: 0
DIZZINESS: 0
PALPITATIONS: 0
ARTHRALGIAS: 1

## 2022-09-28 ASSESSMENT — ACTIVITIES OF DAILY LIVING (ADL): CURRENT_FUNCTION: NO ASSISTANCE NEEDED

## 2022-10-05 ENCOUNTER — OFFICE VISIT (OUTPATIENT)
Dept: INTERNAL MEDICINE | Facility: OTHER | Age: 77
End: 2022-10-05
Attending: INTERNAL MEDICINE
Payer: MEDICARE

## 2022-10-05 VITALS
RESPIRATION RATE: 16 BRPM | OXYGEN SATURATION: 99 % | DIASTOLIC BLOOD PRESSURE: 82 MMHG | HEART RATE: 70 BPM | BODY MASS INDEX: 29.8 KG/M2 | SYSTOLIC BLOOD PRESSURE: 132 MMHG | TEMPERATURE: 97.2 F | WEIGHT: 201.2 LBS | HEIGHT: 69 IN

## 2022-10-05 DIAGNOSIS — Z86.0101 HX OF ADENOMATOUS COLONIC POLYPS: ICD-10-CM

## 2022-10-05 DIAGNOSIS — E11.319 TYPE 2 DIABETES MELLITUS WITH RETINOPATHY OF LEFT EYE, WITHOUT LONG-TERM CURRENT USE OF INSULIN, MACULAR EDEMA PRESENCE UNSPECIFIED, UNSPECIFIED RETINOPATHY SEVERITY (H): Primary | ICD-10-CM

## 2022-10-05 DIAGNOSIS — E78.5 HYPERLIPIDEMIA LDL GOAL <100: ICD-10-CM

## 2022-10-05 DIAGNOSIS — I10 BENIGN ESSENTIAL HYPERTENSION: ICD-10-CM

## 2022-10-05 DIAGNOSIS — M54.12 CERVICAL RADICULOPATHY: ICD-10-CM

## 2022-10-05 LAB
ALBUMIN SERPL-MCNC: 4.6 G/DL (ref 3.5–5.7)
ALP SERPL-CCNC: 57 U/L (ref 34–104)
ALT SERPL W P-5'-P-CCNC: 18 U/L (ref 7–52)
ANION GAP SERPL CALCULATED.3IONS-SCNC: 11 MMOL/L (ref 3–14)
AST SERPL W P-5'-P-CCNC: 18 U/L (ref 13–39)
BILIRUB SERPL-MCNC: 1.1 MG/DL (ref 0.3–1)
BUN SERPL-MCNC: 12 MG/DL (ref 7–25)
CALCIUM SERPL-MCNC: 9.7 MG/DL (ref 8.6–10.3)
CHLORIDE BLD-SCNC: 103 MMOL/L (ref 98–107)
CHOLEST SERPL-MCNC: 100 MG/DL
CO2 SERPL-SCNC: 25 MMOL/L (ref 21–31)
CREAT SERPL-MCNC: 0.98 MG/DL (ref 0.7–1.3)
FASTING STATUS PATIENT QL REPORTED: NO
GFR SERPL CREATININE-BSD FRML MDRD: 80 ML/MIN/1.73M2
GLUCOSE BLD-MCNC: 68 MG/DL (ref 70–105)
HBA1C MFR BLD: 6.2 % (ref 4–6.2)
HDLC SERPL-MCNC: 40 MG/DL (ref 23–92)
LDLC SERPL CALC-MCNC: 45 MG/DL
NONHDLC SERPL-MCNC: 60 MG/DL
POTASSIUM BLD-SCNC: 3.9 MMOL/L (ref 3.5–5.1)
PROT SERPL-MCNC: 7.8 G/DL (ref 6.4–8.9)
SODIUM SERPL-SCNC: 139 MMOL/L (ref 134–144)
TRIGL SERPL-MCNC: 76 MG/DL

## 2022-10-05 PROCEDURE — G0008 ADMIN INFLUENZA VIRUS VAC: HCPCS

## 2022-10-05 PROCEDURE — 82043 UR ALBUMIN QUANTITATIVE: CPT | Mod: ZL | Performed by: INTERNAL MEDICINE

## 2022-10-05 PROCEDURE — 99213 OFFICE O/P EST LOW 20 MIN: CPT | Mod: 25 | Performed by: INTERNAL MEDICINE

## 2022-10-05 PROCEDURE — 80061 LIPID PANEL: CPT | Mod: ZL | Performed by: INTERNAL MEDICINE

## 2022-10-05 PROCEDURE — G0463 HOSPITAL OUTPT CLINIC VISIT: HCPCS | Mod: 25

## 2022-10-05 PROCEDURE — G0439 PPPS, SUBSEQ VISIT: HCPCS | Performed by: INTERNAL MEDICINE

## 2022-10-05 PROCEDURE — 36415 COLL VENOUS BLD VENIPUNCTURE: CPT | Mod: ZL | Performed by: INTERNAL MEDICINE

## 2022-10-05 PROCEDURE — 80053 COMPREHEN METABOLIC PANEL: CPT | Mod: ZL | Performed by: INTERNAL MEDICINE

## 2022-10-05 PROCEDURE — 83036 HEMOGLOBIN GLYCOSYLATED A1C: CPT | Mod: ZL | Performed by: INTERNAL MEDICINE

## 2022-10-05 ASSESSMENT — ACTIVITIES OF DAILY LIVING (ADL): CURRENT_FUNCTION: NO ASSISTANCE NEEDED

## 2022-10-05 ASSESSMENT — PAIN SCALES - GENERAL: PAINLEVEL: EXTREME PAIN (8)

## 2022-10-05 NOTE — PATIENT INSTRUCTIONS
Continue your current medications.    Blood tests today, I will send you a letter with the results.    Flu shot and COVID booster given today.    They will call you to schedule for a colonoscopy.    They will call you to schedule for an MRI of the neck and I will let you know the results.    Follow-up will be dependent on your clinical course and the results of your pending tests.

## 2022-10-05 NOTE — PROGRESS NOTES
"SUBJECTIVE:   Irving is a 76 year old who presents for Preventive Visit.    This patient is here today for Medicare wellness visit.  He has a history of diabetes.  He is on 3 drug therapy for control of his diabetes.  No complications.  He thinks that his diabetes is doing adequately.  He is due to have it rechecked.    He also has hypertension.  He is on single drug therapy for control.  No endorgan disease.  He has treated hyperlipidemia and is on moderate intensity statin therapy that he tolerates.  He does not have any known vascular disease.    His biggest complaint is with his right arm.  He was pushing to try to get his house boat unstuck when all of a sudden the pole gave way.  This was nearly 2 months ago.  He is now having pain in the back of his neck towards the right shoulder with some tingling into his right arm including all of his fingers but not his thumb.  It is not getting better so he would like to have this investigated further.    Medications are reconciled.  Past medical history, past surgical history, family history and social histories are reviewed and updated.  He is due for a flu shot and COVID booster which she would like to get today.  He is due for colonoscopy, he has a history of multiple adenomatous colonic polyps.    Patient has been advised of split billing requirements and indicates understanding: Yes  Are you in the first 12 months of your Medicare coverage?  No    Healthy Habits:     In general, how would you rate your overall health?  Good    Frequency of exercise:  4-5 days/week    Duration of exercise:  15-30 minutes    Do you usually eat at least 4 servings of fruit and vegetables a day, include whole grains    & fiber and avoid regularly eating high fat or \"junk\" foods?  Yes    Taking medications regularly:  Yes    Medication side effects:  Muscle aches    Ability to successfully perform activities of daily living:  No assistance needed    Home Safety:  No safety concerns " identified    Hearing Impairment:  No hearing concerns    In the past 6 months, have you been bothered by leaking of urine?  No    In general, how would you rate your overall mental or emotional health?  Excellent      PHQ-2 Total Score: 0    Additional concerns today:  Yes    Do you feel safe in your environment? Yes    Have you ever done Advance Care Planning? (For example, a Health Directive, POLST, or a discussion with a medical provider or your loved ones about your wishes): No, advance care planning information given to patient to review.  Patient declined advance care planning discussion at this time.       Fall risk  Fallen 2 or more times in the past year?: No  Any fall with injury in the past year?: No    Cognitive Screening   1) Repeat 3 items (Leader, Season, Table)    2) Clock draw: ABNORMAL Unable to draw clock  3) 3 item recall: Recalls NO objects   Results: 0 items recalled: PROBABLE COGNITIVE IMPAIRMENT, **INFORM PROVIDER**    Mini-CogTM Copyright FAREED Mcconnell. Licensed by the author for use in [facility]; reprinted with permission (ingrid@[facility]). All rights reserved.      Do you have sleep apnea, excessive snoring or daytime drowsiness?: no    Reviewed and updated as needed this visit by clinical staff   Tobacco  Allergies  Meds  Problems  Med Hx  Surg Hx  Fam Hx            Reviewed and updated as needed this visit by Provider   Tobacco  Allergies  Meds  Problems  Med Hx  Surg Hx  Fam Hx           Social History     Tobacco Use     Smoking status: Former Smoker     Types: Cigarettes     Quit date: 3/27/2002     Years since quittin.5     Smokeless tobacco: Never Used   Substance Use Topics     Alcohol use: Yes     Comment: occasionally     If you drink alcohol do you typically have >3 drinks per day or >7 drinks per week? No    Alcohol Use 10/5/2022   Prescreen: >3 drinks/day or >7 drinks/week? -   Prescreen: >3 drinks/day or >7 drinks/week? No           Current  "Outpatient Medications   Medication     atorvastatin (LIPITOR) 40 MG tablet     glipiZIDE (GLUCOTROL) 10 MG tablet     JARDIANCE 25 MG TABS tablet     lisinopril (ZESTRIL) 40 MG tablet     metFORMIN (GLUCOPHAGE-XR) 500 MG 24 hr tablet     No current facility-administered medications for this visit.         Current providers sharing in care for this patient include:   Patient Care Team:  Landen Gavin MD as PCP - General (Internal Medicine)  Landen Gavin MD as Assigned PCP  Lucius Elise MD as Assigned Surgical Provider    The following health maintenance items are reviewed in Epic and correct as of today:  Health Maintenance   Topic Date Due     DTAP/TDAP/TD IMMUNIZATION (1 - Tdap) Never done     ZOSTER IMMUNIZATION (1 of 2) Never done     COVID-19 Vaccine (4 - Booster for Moderna series) 12/23/2021     BMP  05/04/2022     LIPID  05/04/2022     MICROALBUMIN  05/04/2022     EYE EXAM  05/04/2022     COLORECTAL CANCER SCREENING  05/08/2022     A1C  08/03/2022     INFLUENZA VACCINE (1) 09/01/2022     Pneumococcal Vaccine: 65+ Years (2 - PCV) 05/04/2022     MEDICARE ANNUAL WELLNESS VISIT  10/05/2023     DIABETIC FOOT EXAM  10/05/2023     FALL RISK ASSESSMENT  10/05/2023     ADVANCE CARE PLANNING  10/05/2027     PHQ-2 (once per calendar year)  Completed     HEPATITIS C SCREENING  Addressed     IPV IMMUNIZATION  Aged Out     MENINGITIS IMMUNIZATION  Aged Out     Lab work is in process          Review of Systems  Constitutional, HEENT, cardiovascular, pulmonary, GI, , musculoskeletal, neuro, skin, endocrine and psych systems are negative, except as otherwise noted.    OBJECTIVE:   /82   Pulse 70   Temp 97.2  F (36.2  C) (Temporal)   Resp 16   Ht 1.75 m (5' 8.9\")   Wt 91.3 kg (201 lb 3.2 oz)   SpO2 99%   BMI 29.80 kg/m   Estimated body mass index is 29.8 kg/m  as calculated from the following:    Height as of this encounter: 1.75 m (5' 8.9\").    Weight as of this encounter: 91.3 kg (201 lb 3.2 " oz).  Physical Exam  GENERAL: healthy, alert and no distress  EYES: Eyes grossly normal to inspection, PERRL and conjunctivae and sclerae normal  HENT: ear canals and TM's normal, nose and mouth without ulcers or lesions  NECK: no adenopathy, no asymmetry, masses, or scars and thyroid normal to palpation  RESP: lungs clear to auscultation - no rales, rhonchi or wheezes  CV: regular rate and rhythm, normal S1 S2, no S3 or S4, no murmur, click or rub, no peripheral edema and peripheral pulses strong  ABDOMEN: soft, nontender, no hepatosplenomegaly, no masses and bowel sounds normal.  Small umbilical hernia  : Normal male, no hernia.  Prostate normal  MS: no gross musculoskeletal defects noted, no edema.  With extension of his neck he has pain in the back of his neck with shooting numbness and pain down into his right arm and hand  SKIN: no suspicious lesions or rashes  NEURO: Normal strength and tone, mentation intact and speech normal  PSYCH: mentation appears normal, affect normal/bright        ASSESSMENT / PLAN:       ICD-10-CM    1. 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 Hyperlipidemia LDL goal <100  E78.5    3. Benign essential hypertension  I10    4. Hx of adenomatous colonic polyps  Z86.010 Colonoscopy Screening  Referral   5. Cervical radiculopathy  M54.12        Patient has been advised of split billing requirements and indicates understanding: Yes    COUNSELING:    Overall, his chronic medical problems appear to be stable.  His medications will continue without change.  Complete lab drawn and pending, I will send him a letter with the results.  We also gave him a flu shot today as well as a COVID booster.  He will also be scheduled for  "colonoscopy as he is due.    We talked about his neck and arm symptoms.  I think he probably has a radiculopathy.  He would like further evaluation as it has been 2 months of discomfort.  We will get an MRI of the neck and I will let him know the results and we will proceed as indicated.      Reviewed preventive health counseling, as reflected in patient instructions       Regular exercise       Healthy diet/nutrition    Estimated body mass index is 29.8 kg/m  as calculated from the following:    Height as of this encounter: 1.75 m (5' 8.9\").    Weight as of this encounter: 91.3 kg (201 lb 3.2 oz).        He reports that he quit smoking about 20 years ago. His smoking use included cigarettes. He has never used smokeless tobacco.      Appropriate preventive services were discussed with this patient, including applicable screening as appropriate for cardiovascular disease, diabetes, osteopenia/osteoporosis, and glaucoma.  As appropriate for age/gender, discussed screening for colorectal cancer, prostate cancer, breast cancer, and cervical cancer. Checklist reviewing preventive services available has been given to the patient.    Reviewed patients plan of care and provided an AVS. The Basic Care Plan (routine screening as documented in Health Maintenance) for Trevor meets the Care Plan requirement. This Care Plan has been established and reviewed with the Patient.    Counseling Resources:  ATP IV Guidelines  Pooled Cohorts Equation Calculator  Breast Cancer Risk Calculator  Breast Cancer: Medication to Reduce Risk  FRAX Risk Assessment  ICSI Preventive Guidelines  Dietary Guidelines for Americans, 2010  USDA's MyPlate  ASA Prophylaxis  Lung CA Screening    NAYA CEDEÑO MD  [facility]    Identified Health Risks:  "

## 2022-10-05 NOTE — LETTER
October 10, 2022      Trevor Alexandre  [address] [place] [address]        Dear Irving,    Below are the results of your recent labs:    Results for orders placed or performed in visit on 10/05/22   Comprehensive metabolic panel     Status: Abnormal   Result Value Ref Range    Sodium 139 134 - 144 mmol/L    Potassium 3.9 3.5 - 5.1 mmol/L    Chloride 103 98 - 107 mmol/L    Carbon Dioxide (CO2) 25 21 - 31 mmol/L    Anion Gap 11 3 - 14 mmol/L    Urea Nitrogen 12 7 - 25 mg/dL    Creatinine 0.98 0.70 - 1.30 mg/dL    Calcium 9.7 8.6 - 10.3 mg/dL    Glucose 68 (L) 70 - 105 mg/dL    Alkaline Phosphatase 57 34 - 104 U/L    AST 18 13 - 39 U/L    ALT 18 7 - 52 U/L    Protein Total 7.8 6.4 - 8.9 g/dL    Albumin 4.6 3.5 - 5.7 g/dL    Bilirubin Total 1.1 (H) 0.3 - 1.0 mg/dL    GFR Estimate 80 >60 mL/min/1.73m2   Lipid Panel     Status: None   Result Value Ref Range    Cholesterol 100 <200 mg/dL    Triglycerides 76 <150 mg/dL    Direct Measure HDL 40 23 - 92 mg/dL    LDL Cholesterol Calculated 45 <=100 mg/dL    Non HDL Cholesterol 60 <130 mg/dL    Patient Fasting > 8hrs? No     Narrative    Cholesterol  Desirable:  <200 mg/dL    Triglycerides  Normal:  Less than 150 mg/dL  Borderline High:  150-199 mg/dL  High:  200-499 mg/dL  Very High:  Greater than or equal to 500 mg/dL    Direct Measure HDL  Female:  Greater than or equal to 50 mg/dL   Male:  Greater than or equal to 40 mg/dL    LDL Cholesterol  Desirable:  <100mg/dL  Above Desirable:  100-129 mg/dL   Borderline High:  130-159 mg/dL   High:  160-189 mg/dL   Very High:  >= 190 mg/dL    Non HDL Cholesterol  Desirable:  130 mg/dL  Above Desirable:  130-159 mg/dL  Borderline High:  160-189 mg/dL  High:  190-219 mg/dL  Very High:  Greater than or equal to 220 mg/dL   Hemoglobin A1c     Status: Normal   Result Value Ref Range    Hemoglobin A1C 6.2 4.0 - 6.2 %   Albumin Random Urine Quantitative with Creat Ratio     Status: None   Result Value Ref Range    Albumin Urine mg/L  <12.0 mg/L    Albumin Urine mg/g Cr      Creatinine Urine mg/dL 108.0 mg/dL        Your recent blood tests look great.  Congratulations on this report and keep up the good work.    Sincerely,        Landen Gavin MD  Internal Medicine  [facility]

## 2022-10-06 LAB
CREAT UR-MCNC: 108 MG/DL
MICROALBUMIN UR-MCNC: <12 MG/L
MICROALBUMIN/CREAT UR: NORMAL MG/G{CREAT}

## 2022-10-14 DIAGNOSIS — E11.319 TYPE 2 DIABETES MELLITUS WITH RETINOPATHY OF LEFT EYE, WITHOUT LONG-TERM CURRENT USE OF INSULIN, MACULAR EDEMA PRESENCE UNSPECIFIED, UNSPECIFIED RETINOPATHY SEVERITY (H): ICD-10-CM

## 2022-10-14 NOTE — TELEPHONE ENCOUNTER
"  Last Prescription Date: 7/13/22  Last Qty/Refills: 90 / 0  Last Office Visit: 10/5/22  Future Office Visit: None     Requested Prescriptions   Pending Prescriptions Disp Refills     JARDIANCE 25 MG TABS tablet [Pharmacy Med Name: Jardiance 25 MG Oral Tablet] 90 tablet 0     Sig: Take 1 tablet by mouth once daily       Sodium Glucose Co-Transport Inhibitor Agents Passed - 10/14/2022  5:30 AM        Passed - Patient has documented A1c within the specified period of time.     If HgbA1C is 8 or greater, it needs to be on file within the past 3 months.  If less than 8, must be on file within the past 6 months.     Recent Labs   Lab Test 10/05/22  1529   A1C 6.2             Passed - No creatinine >1.4 or GFR <45 within the past 12 mos     Recent Labs   Lab Test 10/05/22  1529 05/04/21  1625   GFRESTIMATED 80 82   GFRESTBLACK  --  >90       Recent Labs   Lab Test 10/05/22  1529   CR 0.98             Passed - Medication is active on med list        Passed - Patient is age 18 or older        Passed - Patient has documented normal Potassium within the last 12 mos.     Recent Labs   Lab Test 10/05/22  1529   POTASSIUM 3.9             Passed - Recent (6 mo) or future (30 days) visit within the authorizing provider's specialty     Patient had office visit in the last 6 months or has a visit in the next 30 days with authorizing provider or within the authorizing provider's specialty.  See \"Patient Info\" tab in inbasket, or \"Choose Columns\" in Meds & Orders section of the refill encounter.                 "

## 2022-10-17 RX ORDER — EMPAGLIFLOZIN 25 MG/1
TABLET, FILM COATED ORAL
Qty: 90 TABLET | Refills: 0 | Status: SHIPPED | OUTPATIENT
Start: 2022-10-17 | End: 2023-01-18

## 2023-01-14 DIAGNOSIS — E11.319 TYPE 2 DIABETES MELLITUS WITH RETINOPATHY OF LEFT EYE, WITHOUT LONG-TERM CURRENT USE OF INSULIN, MACULAR EDEMA PRESENCE UNSPECIFIED, UNSPECIFIED RETINOPATHY SEVERITY (H): ICD-10-CM

## 2023-01-18 RX ORDER — EMPAGLIFLOZIN 25 MG/1
TABLET, FILM COATED ORAL
Qty: 90 TABLET | Refills: 0 | Status: SHIPPED | OUTPATIENT
Start: 2023-01-18 | End: 2023-03-03

## 2023-01-18 NOTE — TELEPHONE ENCOUNTER
"   Disp Refills Start End DWIGHT   JARDIANCE 25 MG TABS tablet 90 tablet 0 10/17/2022  No   Sig: Take 1 tablet by mouth once daily   Sent to pharmacy as: Jardiance 25 MG Oral Tablet (empagliflozin)   Class: E-Prescribe       Last Office Visit: 10/05/2022  Future Office visit:         Requested Prescriptions   Pending Prescriptions Disp Refills     JARDIANCE 25 MG TABS tablet [Pharmacy Med Name: Jardiance 25 MG Oral Tablet] 90 tablet 0     Sig: Take 1 tablet by mouth once daily       Sodium Glucose Co-Transport Inhibitor Agents Passed - 1/14/2023  8:54 AM        Passed - Patient has documented A1c within the specified period of time.     If HgbA1C is 8 or greater, it needs to be on file within the past 3 months.  If less than 8, must be on file within the past 6 months.     Recent Labs   Lab Test 10/05/22  1529   A1C 6.2             Passed - No creatinine >1.4 or GFR <45 within the past 12 mos     Recent Labs   Lab Test 10/05/22  1529 05/04/21  1625   GFRESTIMATED 80 82   GFRESTBLACK  --  >90       Recent Labs   Lab Test 10/05/22  1529   CR 0.98             Passed - Medication is active on med list        Passed - Patient is age 18 or older        Passed - Patient has documented normal Potassium within the last 12 mos.     Recent Labs   Lab Test 10/05/22  1529   POTASSIUM 3.9             Passed - Recent (6 mo) or future (30 days) visit within the authorizing provider's specialty     Patient had office visit in the last 6 months or has a visit in the next 30 days with authorizing provider or within the authorizing provider's specialty.  See \"Patient Info\" tab in inbasket, or \"Choose Columns\" in Meds & Orders section of the refill encounter.                 Routing refill request to provider for review/approval.     Unable to complete prescription refill per RNMedication Refill Policy.................... Darby Maddox RN ....................  1/18/2023   8:49 AM          "

## 2023-03-03 ENCOUNTER — OFFICE VISIT (OUTPATIENT)
Dept: INTERNAL MEDICINE | Facility: OTHER | Age: 78
End: 2023-03-03
Attending: STUDENT IN AN ORGANIZED HEALTH CARE EDUCATION/TRAINING PROGRAM
Payer: MEDICARE

## 2023-03-03 VITALS
WEIGHT: 202.8 LBS | SYSTOLIC BLOOD PRESSURE: 134 MMHG | HEART RATE: 84 BPM | BODY MASS INDEX: 30.04 KG/M2 | DIASTOLIC BLOOD PRESSURE: 82 MMHG | OXYGEN SATURATION: 98 % | RESPIRATION RATE: 16 BRPM | TEMPERATURE: 98.2 F

## 2023-03-03 DIAGNOSIS — E78.5 HYPERLIPIDEMIA LDL GOAL <100: ICD-10-CM

## 2023-03-03 DIAGNOSIS — L57.0 ACTINIC KERATOSIS: ICD-10-CM

## 2023-03-03 DIAGNOSIS — L82.1 SEBORRHEIC KERATOSIS: ICD-10-CM

## 2023-03-03 DIAGNOSIS — E11.319 TYPE 2 DIABETES MELLITUS WITH RETINOPATHY OF LEFT EYE, WITHOUT LONG-TERM CURRENT USE OF INSULIN, MACULAR EDEMA PRESENCE UNSPECIFIED, UNSPECIFIED RETINOPATHY SEVERITY (H): ICD-10-CM

## 2023-03-03 DIAGNOSIS — L57.8 DIFFUSE PHOTODAMAGE OF SKIN: ICD-10-CM

## 2023-03-03 DIAGNOSIS — E11.319 TYPE 2 DIABETES MELLITUS WITH RETINOPATHY OF LEFT EYE, WITHOUT LONG-TERM CURRENT USE OF INSULIN, MACULAR EDEMA PRESENCE UNSPECIFIED, UNSPECIFIED RETINOPATHY SEVERITY (H): Primary | ICD-10-CM

## 2023-03-03 LAB
ANION GAP SERPL CALCULATED.3IONS-SCNC: 12 MMOL/L (ref 7–15)
BUN SERPL-MCNC: 11 MG/DL (ref 8–23)
CALCIUM SERPL-MCNC: 9.9 MG/DL (ref 8.8–10.2)
CHLORIDE SERPL-SCNC: 101 MMOL/L (ref 98–107)
CREAT SERPL-MCNC: 0.79 MG/DL (ref 0.67–1.17)
DEPRECATED HCO3 PLAS-SCNC: 26 MMOL/L (ref 22–29)
GFR SERPL CREATININE-BSD FRML MDRD: >90 ML/MIN/1.73M2
GLUCOSE SERPL-MCNC: 130 MG/DL (ref 70–99)
HBA1C MFR BLD: 6.6 % (ref 4–6.2)
HOLD SPECIMEN: NORMAL
POTASSIUM SERPL-SCNC: 4 MMOL/L (ref 3.4–5.3)
SODIUM SERPL-SCNC: 139 MMOL/L (ref 136–145)

## 2023-03-03 PROCEDURE — G0463 HOSPITAL OUTPT CLINIC VISIT: HCPCS | Mod: 25

## 2023-03-03 PROCEDURE — 17003 DESTRUCT PREMALG LES 2-14: CPT | Performed by: STUDENT IN AN ORGANIZED HEALTH CARE EDUCATION/TRAINING PROGRAM

## 2023-03-03 PROCEDURE — 17000 DESTRUCT PREMALG LESION: CPT | Performed by: STUDENT IN AN ORGANIZED HEALTH CARE EDUCATION/TRAINING PROGRAM

## 2023-03-03 PROCEDURE — 36415 COLL VENOUS BLD VENIPUNCTURE: CPT | Mod: ZL | Performed by: STUDENT IN AN ORGANIZED HEALTH CARE EDUCATION/TRAINING PROGRAM

## 2023-03-03 PROCEDURE — G0463 HOSPITAL OUTPT CLINIC VISIT: HCPCS

## 2023-03-03 PROCEDURE — 80048 BASIC METABOLIC PNL TOTAL CA: CPT | Mod: ZL | Performed by: STUDENT IN AN ORGANIZED HEALTH CARE EDUCATION/TRAINING PROGRAM

## 2023-03-03 PROCEDURE — 99213 OFFICE O/P EST LOW 20 MIN: CPT | Mod: 25 | Performed by: STUDENT IN AN ORGANIZED HEALTH CARE EDUCATION/TRAINING PROGRAM

## 2023-03-03 PROCEDURE — 83036 HEMOGLOBIN GLYCOSYLATED A1C: CPT | Mod: ZL | Performed by: STUDENT IN AN ORGANIZED HEALTH CARE EDUCATION/TRAINING PROGRAM

## 2023-03-03 RX ORDER — METFORMIN HCL 500 MG
500 TABLET, EXTENDED RELEASE 24 HR ORAL 2 TIMES DAILY WITH MEALS
Qty: 180 TABLET | Refills: 3 | Status: SHIPPED | OUTPATIENT
Start: 2023-03-03 | End: 2024-01-11

## 2023-03-03 RX ORDER — GLIPIZIDE 10 MG/1
10 TABLET ORAL
Qty: 180 TABLET | Refills: 3 | Status: SHIPPED | OUTPATIENT
Start: 2023-03-03 | End: 2024-01-11

## 2023-03-03 RX ORDER — LISINOPRIL 40 MG/1
40 TABLET ORAL DAILY
Qty: 90 TABLET | Refills: 3 | Status: SHIPPED | OUTPATIENT
Start: 2023-03-03 | End: 2024-01-11

## 2023-03-03 RX ORDER — ATORVASTATIN CALCIUM 40 MG/1
40 TABLET, FILM COATED ORAL DAILY
Qty: 90 TABLET | Refills: 3 | Status: SHIPPED | OUTPATIENT
Start: 2023-03-03 | End: 2024-01-11

## 2023-03-03 NOTE — TELEPHONE ENCOUNTER
[facility] sent Rx request for the following:      Requested Prescriptions   Pending Prescriptions Disp Refills     metFORMIN (GLUCOPHAGE XR) 500 MG 24 hr tablet [Pharmacy Med Name: metFORMIN HCl  MG Oral Tablet Extended Release 24 Hour] 180 tablet 0     Sig: TAKE 1 TABLET BY MOUTH TWICE DAILY WITH MEALS       Biguanide Agents Passed - 3/3/2023 10:48 AM     Last Prescription Serafin.umhie:   2/3/2022  Last Fill Qty/Refills:         180, R-3    Last Office Visit:              10/5/2022   Future Office visit:             Future Appointments 3/3/2023 - 8/30/2023      Date Visit Type Length Department Provider     3/3/2023  3:40 PM LONG 40 min  INTERNAL MED Rober Panda MD    Location Instructions:     [facility] is located west of [address] and [address]                 Routing refill request to provider for consideration.     Marcela Canales RN on 3/3/2023 at 10:50 AM

## 2023-03-03 NOTE — PROGRESS NOTES
Assessment & Plan         ICD-10-CM    1. 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 Hyperlipidemia LDL goal <100  E78.5 atorvastatin (LIPITOR) 40 MG tablet      3. Actinic keratosis  L57.0 DESTRUCT PREMALIGNANT LESION, FIRST     DESTRUCT PREMALIGNANT LESION, 2-14      4. Seborrheic keratosis  L82.1       5. Diffuse photodamage of skin  L57.8         Type 2 diabetes: Jardiance is too expensive for the patient and his A1c has been under good control.  He may stop his Jardiance and we will continue metformin and glipizide.  Could consider further uptitrating metformin or adding a DPP 4 for cost savings if needed in the future.  Refill his metformin and glipizide and atorvastatin today.  Recheck labs.    Actinic keratoses: 10 total actinic keratoses were treated with cryotherapy using 3 rounds of freeze thaw cycles 5 on the scalp, 1 on the left ear, 1 on the left sideburn, 1 each under the right and left eye and 1 on his chest near his sternum.  Multiple areas of photodamage skin.  Discussed photoprotection with long sleeves and sunscreen and avoiding sun exposure.          No follow-ups on file.    Rober Panda MD  [facility]      Subjective   Irving is a 77 year old, presenting for the following health issues:  Diabetes (And est care  )      History of Present Illness       Diabetes:   He presents for follow up of diabetes.  He is checking home blood glucose a few times a month. He checks blood glucose before and after meals.  Blood glucose is never over 200 and never under 70. He is aware of hypoglycemia symptoms including dizziness. He is  concerned about other.  He is having blurry vision. The patient has not had a diabetic eye exam in the last 12 months.         Reason for visit:  Skin Caner Screening,  Arthritis Joint  Pain and Diabetes Medication    He eats 2-3 servings of fruits and vegetables daily.He consumes 0 sweetened beverage(s) daily.He exercises with enough effort to increase his heart rate 9 or less minutes per day.  He exercises with enough effort to increase his heart rate 3 or less days per week.   He is taking medications regularly.       Skin concerns.     Blood sugars have averaged 130s-140.     Jardiance, glipizide and metformin.   jardiance too expensive.         Diabetes Follow-up      BP Readings from Last 2 Encounters:   03/03/23 134/82   10/05/22 132/82     Hemoglobin A1C (%)   Date Value   10/05/2022 6.2   02/03/2022 6.6 (H)   05/04/2021 6.6 (H)   06/03/2020 6.0     LDL Cholesterol Calculated (mg/dL)   Date Value   10/05/2022 45   05/04/2021 44   06/03/2020 65                         Review of Systems         Objective    /82 (BP Location: Right arm, Patient Position: Sitting, Cuff Size: Adult Regular)   Pulse 84   Temp 98.2  F (36.8  C) (Temporal)   Resp 16   Wt 92 kg (202 lb 12.8 oz)   SpO2 98%   BMI 30.04 kg/m    Body mass index is 30.04 kg/m .  Physical Exam   General: Pleasant 77-year-old man sitting clinic no acute distress  Skin: 10 total actinic keratoses were present 5 on the scalp 1 on the left ear 1 on the left sideburn 1 underneath the right eye 1 underneath the left eye and one lesion on the chest.  All of these lesions were treated with 3 rounds of freeze thaw cycle cryotherapy.  Significant hyperpigmented nevi burden none warrant biopsy at this time on the chest shoulders or back.  Also has several seborrheic keratoses which are benign appearing.

## 2023-03-03 NOTE — LETTER
March 4, 2023      Irving Alexandre  [address]  [facility] [address]        Dear ,    We are writing to inform you of your test results.    Your lab results are below.  Your hemoglobin A1c is 6.6 today which still indicates good control.  I am concerned that your blood sugars may rise with stopping the Jardiance.  Please let me know if you are having elevated blood sugars at home and we could increase your glipizide versus add an additional oral agent if you are persistently elevated and your blood sugars near the 200 range.  Otherwise we will have you follow-up in 3 to 6 months for recheck of your labs.    Resulted Orders   Hemoglobin A1c   Result Value Ref Range    Hemoglobin A1C 6.6 (H) 4.0 - 6.2 %   Basic Metabolic Panel   Result Value Ref Range    Sodium 139 136 - 145 mmol/L    Potassium 4.0 3.4 - 5.3 mmol/L    Chloride 101 98 - 107 mmol/L    Carbon Dioxide (CO2) 26 22 - 29 mmol/L    Anion Gap 12 7 - 15 mmol/L    Urea Nitrogen 11.0 8.0 - 23.0 mg/dL    Creatinine 0.79 0.67 - 1.17 mg/dL    Calcium 9.9 8.8 - 10.2 mg/dL    Glucose 130 (H) 70 - 99 mg/dL    GFR Estimate >90 >60 mL/min/1.73m2      Comment:      eGFR calculated using 2021 CKD-EPI equation.       If you have any questions or concerns, please call the clinic at the number listed above.       Sincerely,      Rober Panda MD

## 2023-03-03 NOTE — NURSING NOTE
"Chief Complaint   Patient presents with     Diabetes     And est care         Medication reconciliation completed.    FOOD SECURITY SCREENING QUESTIONS:    The next two questions are to help us understand your food security.  If you are feeling you need any assistance in this area, we have resources available to support you today.    Hunger Vital Signs:  Within the past 12 months we worried whether our food would run out before we got money to buy more. Never  Within the past 12 months the food we bought just didn't last and we didn't have money to get more. Never    Initial /82 (BP Location: Right arm, Patient Position: Sitting, Cuff Size: Adult Regular)   Pulse 84   Temp 98.2  F (36.8  C) (Temporal)   Resp 16   Wt 92 kg (202 lb 12.8 oz)   SpO2 98%   BMI 30.04 kg/m   Estimated body mass index is 30.04 kg/m  as calculated from the following:    Height as of 10/5/22: 1.75 m (5' 8.9\").    Weight as of this encounter: 92 kg (202 lb 12.8 oz).       Rosey Valente LPN .......  3/3/2023  3:48 PM  "

## 2023-03-07 RX ORDER — METFORMIN HCL 500 MG
TABLET, EXTENDED RELEASE 24 HR ORAL
Qty: 180 TABLET | Refills: 1 | Status: SHIPPED | OUTPATIENT
Start: 2023-03-07 | End: 2024-01-11

## 2023-03-21 ENCOUNTER — HOSPITAL ENCOUNTER (EMERGENCY)
Facility: OTHER | Age: 78
Discharge: HOME | End: 2023-03-21
Attending: PHYSICIAN ASSISTANT | Admitting: PHYSICIAN ASSISTANT
Payer: MEDICARE

## 2023-03-21 ENCOUNTER — APPOINTMENT (OUTPATIENT)
Dept: GENERAL RADIOLOGY | Facility: OTHER | Age: 78
End: 2023-03-21
Attending: PHYSICIAN ASSISTANT
Payer: MEDICARE

## 2023-03-21 VITALS
OXYGEN SATURATION: 95 % | HEIGHT: 68 IN | WEIGHT: 202 LBS | BODY MASS INDEX: 30.62 KG/M2 | DIASTOLIC BLOOD PRESSURE: 69 MMHG | RESPIRATION RATE: 16 BRPM | TEMPERATURE: 99.1 F | SYSTOLIC BLOOD PRESSURE: 125 MMHG | HEART RATE: 91 BPM

## 2023-03-21 DIAGNOSIS — U07.1 INFECTION DUE TO 2019 NOVEL CORONAVIRUS: ICD-10-CM

## 2023-03-21 LAB
ALBUMIN UR-MCNC: NEGATIVE MG/DL
ANION GAP SERPL CALCULATED.3IONS-SCNC: 15 MMOL/L (ref 7–15)
APPEARANCE UR: CLEAR
BASOPHILS # BLD AUTO: 0 10E3/UL (ref 0–0.2)
BASOPHILS NFR BLD AUTO: 0 %
BILIRUB UR QL STRIP: NEGATIVE
BUN SERPL-MCNC: 10.6 MG/DL (ref 8–23)
CALCIUM SERPL-MCNC: 9.6 MG/DL (ref 8.8–10.2)
CHLORIDE SERPL-SCNC: 100 MMOL/L (ref 98–107)
COLOR UR AUTO: ABNORMAL
CREAT SERPL-MCNC: 0.87 MG/DL (ref 0.67–1.17)
DEPRECATED HCO3 PLAS-SCNC: 22 MMOL/L (ref 22–29)
EOSINOPHIL # BLD AUTO: 0 10E3/UL (ref 0–0.7)
EOSINOPHIL NFR BLD AUTO: 0 %
ERYTHROCYTE [DISTWIDTH] IN BLOOD BY AUTOMATED COUNT: 13.2 % (ref 10–15)
FLUAV RNA SPEC QL NAA+PROBE: NEGATIVE
FLUBV RNA RESP QL NAA+PROBE: NEGATIVE
GFR SERPL CREATININE-BSD FRML MDRD: 89 ML/MIN/1.73M2
GLUCOSE SERPL-MCNC: 146 MG/DL (ref 70–99)
GLUCOSE UR STRIP-MCNC: >1000 MG/DL
GROUP A STREP BY PCR: NOT DETECTED
HCT VFR BLD AUTO: 47.9 % (ref 40–53)
HGB BLD-MCNC: 16.5 G/DL (ref 13.3–17.7)
HGB UR QL STRIP: ABNORMAL
HOLD SPECIMEN: NORMAL
IMM GRANULOCYTES # BLD: 0.1 10E3/UL
IMM GRANULOCYTES NFR BLD: 2 %
KETONES UR STRIP-MCNC: 60 MG/DL
LACTATE SERPL-SCNC: 1.4 MMOL/L (ref 0.7–2)
LEUKOCYTE ESTERASE UR QL STRIP: NEGATIVE
LYMPHOCYTES # BLD AUTO: 0.5 10E3/UL (ref 0.8–5.3)
LYMPHOCYTES NFR BLD AUTO: 6 %
MCH RBC QN AUTO: 31.4 PG (ref 26.5–33)
MCHC RBC AUTO-ENTMCNC: 34.4 G/DL (ref 31.5–36.5)
MCV RBC AUTO: 91 FL (ref 78–100)
MONOCYTES # BLD AUTO: 0.8 10E3/UL (ref 0–1.3)
MONOCYTES NFR BLD AUTO: 10 %
MONOCYTES NFR BLD AUTO: NEGATIVE %
NEUTROPHILS # BLD AUTO: 6.3 10E3/UL (ref 1.6–8.3)
NEUTROPHILS NFR BLD AUTO: 82 %
NITRATE UR QL: NEGATIVE
NRBC # BLD AUTO: 0 10E3/UL
NRBC BLD AUTO-RTO: 0 /100
PH UR STRIP: 5 [PH] (ref 5–9)
PLATELET # BLD AUTO: 164 10E3/UL (ref 150–450)
POTASSIUM SERPL-SCNC: 3.7 MMOL/L (ref 3.4–5.3)
RBC # BLD AUTO: 5.26 10E6/UL (ref 4.4–5.9)
RBC URINE: 1 /HPF
RSV RNA SPEC NAA+PROBE: NEGATIVE
SARS-COV-2 RNA RESP QL NAA+PROBE: POSITIVE
SODIUM SERPL-SCNC: 137 MMOL/L (ref 136–145)
SP GR UR STRIP: 1.03 (ref 1–1.03)
TROPONIN T SERPL HS-MCNC: 13 NG/L
UROBILINOGEN UR STRIP-MCNC: NORMAL MG/DL
WBC # BLD AUTO: 7.7 10E3/UL (ref 4–11)
WBC URINE: 1 /HPF

## 2023-03-21 PROCEDURE — 93005 ELECTROCARDIOGRAM TRACING: CPT | Performed by: PHYSICIAN ASSISTANT

## 2023-03-21 PROCEDURE — 258N000003 HC RX IP 258 OP 636: Performed by: PHYSICIAN ASSISTANT

## 2023-03-21 PROCEDURE — 87651 STREP A DNA AMP PROBE: CPT | Performed by: PHYSICIAN ASSISTANT

## 2023-03-21 PROCEDURE — 36415 COLL VENOUS BLD VENIPUNCTURE: CPT | Performed by: PHYSICIAN ASSISTANT

## 2023-03-21 PROCEDURE — 80048 BASIC METABOLIC PNL TOTAL CA: CPT | Performed by: PHYSICIAN ASSISTANT

## 2023-03-21 PROCEDURE — 99285 EMERGENCY DEPT VISIT HI MDM: CPT | Mod: 25,CS | Performed by: PHYSICIAN ASSISTANT

## 2023-03-21 PROCEDURE — 93010 ELECTROCARDIOGRAM REPORT: CPT | Performed by: INTERNAL MEDICINE

## 2023-03-21 PROCEDURE — C9803 HOPD COVID-19 SPEC COLLECT: HCPCS | Performed by: PHYSICIAN ASSISTANT

## 2023-03-21 PROCEDURE — 86308 HETEROPHILE ANTIBODY SCREEN: CPT | Performed by: PHYSICIAN ASSISTANT

## 2023-03-21 PROCEDURE — 85004 AUTOMATED DIFF WBC COUNT: CPT | Performed by: PHYSICIAN ASSISTANT

## 2023-03-21 PROCEDURE — 87637 SARSCOV2&INF A&B&RSV AMP PRB: CPT | Performed by: PHYSICIAN ASSISTANT

## 2023-03-21 PROCEDURE — 96365 THER/PROPH/DIAG IV INF INIT: CPT | Performed by: PHYSICIAN ASSISTANT

## 2023-03-21 PROCEDURE — 250N000011 HC RX IP 250 OP 636: Performed by: PHYSICIAN ASSISTANT

## 2023-03-21 PROCEDURE — 83605 ASSAY OF LACTIC ACID: CPT | Performed by: PHYSICIAN ASSISTANT

## 2023-03-21 PROCEDURE — 71045 X-RAY EXAM CHEST 1 VIEW: CPT

## 2023-03-21 PROCEDURE — 96361 HYDRATE IV INFUSION ADD-ON: CPT | Performed by: PHYSICIAN ASSISTANT

## 2023-03-21 PROCEDURE — 84484 ASSAY OF TROPONIN QUANT: CPT | Performed by: PHYSICIAN ASSISTANT

## 2023-03-21 PROCEDURE — 99284 EMERGENCY DEPT VISIT MOD MDM: CPT | Mod: CS | Performed by: PHYSICIAN ASSISTANT

## 2023-03-21 PROCEDURE — 81001 URINALYSIS AUTO W/SCOPE: CPT | Performed by: PHYSICIAN ASSISTANT

## 2023-03-21 RX ORDER — GUAIFENESIN/DEXTROMETHORPHAN 100-10MG/5
10 SYRUP ORAL 3 TIMES DAILY PRN
Qty: 118 ML | Refills: 0 | Status: SHIPPED | OUTPATIENT
Start: 2023-03-21 | End: 2024-01-11

## 2023-03-21 RX ORDER — BENZONATATE 100 MG/1
100 CAPSULE ORAL 3 TIMES DAILY PRN
Qty: 21 CAPSULE | Refills: 0 | Status: SHIPPED | OUTPATIENT
Start: 2023-03-21 | End: 2023-03-28

## 2023-03-21 RX ORDER — ACETAMINOPHEN 10 MG/ML
1000 INJECTION, SOLUTION INTRAVENOUS ONCE
Status: COMPLETED | OUTPATIENT
Start: 2023-03-21 | End: 2023-03-21

## 2023-03-21 RX ADMIN — SODIUM CHLORIDE 1000 ML: 9 INJECTION, SOLUTION INTRAVENOUS at 14:35

## 2023-03-21 RX ADMIN — ACETAMINOPHEN 1000 MG: 10 INJECTION, SOLUTION INTRAVENOUS at 14:41

## 2023-03-21 ASSESSMENT — ENCOUNTER SYMPTOMS
DYSURIA: 0
FEVER: 1
CHILLS: 1
ABDOMINAL PAIN: 0
NAUSEA: 0
CONFUSION: 0
VOMITING: 0
SHORTNESS OF BREATH: 0
FATIGUE: 1
LIGHT-HEADEDNESS: 1

## 2023-03-21 ASSESSMENT — ACTIVITIES OF DAILY LIVING (ADL): ADLS_ACUITY_SCORE: 35

## 2023-03-21 NOTE — DISCHARGE INSTRUCTIONS
Get plenty of fluids and rest.  As discussed, you are positive for COVID which I think would explain the symptoms you are having.  The remainder of your studies appeared very well as did your vital signs and physical exam.  I expect symptoms to gradually improve over the coming week.  Alternate Tylenol and ibuprofen every 4 hours to help with fever and comfort control.  Did prescribe you some anticough medication.  Return if there is intractable fevers, difficulty breathing, dehydration etc.

## 2023-03-21 NOTE — ED TRIAGE NOTES
Pt here by himself, pt reports that he started feeling ill last night, pt reports having chills, some dizziness and felt like he was going to pass out this AM, VSS, pt into bay [facility] via w/c     Triage Assessment     Row Name 03/21/23 1408       Triage Assessment (Adult)    Airway WDL WDL       Respiratory WDL    Respiratory WDL WDL       Skin Circulation/Temperature WDL    Skin Circulation/Temperature WDL WDL       Cardiac WDL    Cardiac WDL WDL       Peripheral/Neurovascular WDL    Peripheral Neurovascular WDL WDL       Cognitive/Neuro/Behavioral WDL    Cognitive/Neuro/Behavioral WDL WDL

## 2023-03-21 NOTE — ED PROVIDER NOTES
History     Chief Complaint   Patient presents with     Chills     Syncope     HPI  Trevor Alexandre is a 77 year old male who presents to the ED for evaluation of chills, fever, malaise. Pt here by himself, pt reports that he started feeling ill last night, pt reports having chills, some dizziness and felt like he was going to pass out this AM.    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, without long-term current use of insulin, macular edema presence unspecified, unspecified retinopathy severity (H) 02/12/2018     Priority: Medium     Benign essential hypertension 02/12/2018     Priority: Medium     Hyperlipidemia LDL goal <100 02/12/2018     Priority: Medium        Past Medical History:    Past Medical History:   Diagnosis Date     Abscess of back 03/23/2017     Diabetes mellitus, type 2 (H)      Diabetic retinopathy (H)      Hx of adenomatous colonic polyps      Hyperlipidemia      Hypertension      Inflammatory arthritis        Past Surgical History:    Past Surgical History:   Procedure Laterality Date     ARTHROSCOPY SHOULDER RT/LT Bilateral      CHOLECYSTECTOMY       COLONOSCOPY  12/02/2014    Adenomatous polyps     COLONOSCOPY N/A 5/8/2019    adenomas, follow up in 2022     EXAM UNDER ANESTHESIA, CHANGE DRESSING (LOCATION), COMBINED N/A 3/24/2017    Procedure: COMBINED EXAM UNDER ANESTHESIA, CHANGE DRESSING (LOCATION);  Surgeon: Tahir De Jesus DO;  Location: [facility]     EXCISE LESION BACK N/A 3/22/2017    Procedure: EXCISE LESION BACK;  Surgeon: Servando Dale MD;  Location: [facility]     FINGER SURGERY Right     Middle     HEMORRHOIDECTOMY BANDING N/A 5/8/2019    Procedure: Hemorrhoid banding;  Surgeon: Velvet Lees MD;  Location:  OR     IRRIGATION AND  "DEBRIDEMENT TRUNK, COMBINED N/A 3/23/2017    Procedure: COMBINED IRRIGATION AND DEBRIDEMENT TRUNK;  Surgeon: Servando Dale MD;  Location: [facility]       Family History:    Family History   Problem Relation Age of Onset     Heart Disease Mother         MI     Alzheimer Disease Father      Alzheimer Disease Brother      Other - See Comments Brother         CNS aneurysm       Social History:  Marital Status:   [5]  Social History     Tobacco Use     Smoking status: Former     Types: Cigarettes     Quit date: 3/27/2002     Years since quittin.9     Smokeless tobacco: Never   Vaping Use     Vaping Use: Never used   Substance Use Topics     Alcohol use: Yes     Comment: occasionally     Drug use: No        Medications:    benzonatate (TESSALON) 100 MG capsule  guaiFENesin-dextromethorphan (ROBITUSSIN DM) 100-10 MG/5ML syrup  atorvastatin (LIPITOR) 40 MG tablet  glipiZIDE (GLUCOTROL) 10 MG tablet  lisinopril (ZESTRIL) 40 MG tablet  metFORMIN (GLUCOPHAGE XR) 500 MG 24 hr tablet  metFORMIN (GLUCOPHAGE XR) 500 MG 24 hr tablet          Review of Systems   Constitutional: Positive for chills, fatigue and fever.   HENT: Negative for congestion.    Eyes: Negative for visual disturbance.   Respiratory: Negative for shortness of breath.    Cardiovascular: Negative for chest pain.   Gastrointestinal: Negative for abdominal pain, nausea and vomiting.   Genitourinary: Negative for dysuria.   Neurological: Positive for light-headedness.   Psychiatric/Behavioral: Negative for confusion.       Physical Exam   BP: (!) 148/78  Pulse: 100  Temp: (!) 101  F (38.3  C)  Resp: 16  Height: 172.7 cm (5' 8\")  Weight: 91.6 kg (202 lb)  SpO2: 97 %      Physical Exam  Constitutional:       General: He is not in acute distress.     Appearance: He is well-developed. He is not diaphoretic.   HENT:      Head: Normocephalic and atraumatic.   Eyes:      General: No scleral icterus.     Conjunctiva/sclera: Conjunctivae normal.   Cardiovascular: "      Rate and Rhythm: Normal rate and regular rhythm.   Pulmonary:      Effort: Pulmonary effort is normal.      Breath sounds: Normal breath sounds.   Abdominal:      Palpations: Abdomen is soft.      Tenderness: There is no abdominal tenderness.   Musculoskeletal:         General: No deformity.      Cervical back: Neck supple.   Lymphadenopathy:      Cervical: No cervical adenopathy.   Skin:     General: Skin is warm and dry.      Coloration: Skin is not jaundiced.      Findings: No rash.   Neurological:      General: No focal deficit present.      Mental Status: He is alert. Mental status is at baseline.   Psychiatric:         Mood and Affect: Mood normal.         Behavior: Behavior normal.         ED Course                 Procedures         EKG read at 1413. HR 97, NSR, no ST changes.    Critical Care time:  none               Results for orders placed or performed during the hospital encounter of 03/21/23 (from the past 24 hour(s))   Symptomatic Influenza A/B, RSV, & SARS-CoV2 PCR (COVID-19) Nose    Specimen: Nose; Swab   Result Value Ref Range    Influenza A PCR Negative Negative    Influenza B PCR Negative Negative    RSV PCR Negative Negative    SARS CoV2 PCR Positive (A) Negative    Narrative    Testing was performed using the Xpert Xpress CoV2/Flu/RSV Assay on the Cepheid GeneXpert Instrument. This test should be ordered for the detection of SARS-CoV-2, influenza, and RSV viruses in individuals who meet clinical and/or epidemiological criteria. Test performance is unknown in asymptomatic patients. This test is for in vitro diagnostic use under the FDA EUA for laboratories certified under CLIA to perform high or moderate complexity testing. This test has not been FDA cleared or approved. A negative result does not rule out the presence of PCR inhibitors in the specimen or target RNA in concentration below the limit of detection for the assay. If only one viral target is positive but coinfection with  multiple targets is suspected, the sample should be re-tested with another FDA cleared, approved, or authorized test, if coinfection would change clinical management. This test was validated by the [facility]. These laboratories are certified under the Clinical Laboratory Improvement Amendments of 1988 (CLIA-88) as qualified to perform high complexity laboratory testing.   Group A Streptococcus PCR Throat Swab    Specimen: Throat; Swab   Result Value Ref Range    Group A strep by PCR Not Detected Not Detected    Narrative    The Xpert Xpress Strep A test, performed on the [employer], is a rapid, qualitative in vitro diagnostic test for the detection of Streptococcus pyogenes (Group A ß-hemolytic Streptococcus, Strep A) in throat swab specimens from patients with signs and symptoms of pharyngitis. The Xpert Xpress Strep A test can be used as an aid in the diagnosis of Group A Streptococcal pharyngitis. The assay is not intended to monitor treatment for Group A Streptococcus infections. The Xpert Xpress Strep A test utilizes an automated real-time polymerase chain reaction (PCR) to detect Streptococcus pyogenes DNA.   CBC with platelets differential    Narrative    The following orders were created for panel order CBC with platelets differential.  Procedure                               Abnormality         Status                     ---------                               -----------         ------                     CBC with platelets and d...[[id number]]  Abnormal            Final result                 Please view results for these tests on the individual orders.   Basic metabolic panel   Result Value Ref Range    Sodium 137 136 - 145 mmol/L    Potassium 3.7 3.4 - 5.3 mmol/L    Chloride 100 98 - 107 mmol/L    Carbon Dioxide (CO2) 22 22 - 29 mmol/L    Anion Gap 15 7 - 15 mmol/L    Urea Nitrogen 10.6 8.0 - 23.0 mg/dL    Creatinine 0.87 0.67 - 1.17 mg/dL    Calcium 9.6 8.8 - 10.2  mg/dL    Glucose 146 (H) 70 - 99 mg/dL    GFR Estimate 89 >60 mL/min/1.73m2   Troponin T, High Sensitivity   Result Value Ref Range    Troponin T, High Sensitivity 13 <=22 ng/L   Mononucleosis screen   Result Value Ref Range    Mononucleosis Screen Negative Negative   Lactic Acid STAT   Result Value Ref Range    Lactic Acid 1.4 0.7 - 2.0 mmol/L   CBC with platelets and differential   Result Value Ref Range    WBC Count 7.7 4.0 - 11.0 10e3/uL    RBC Count 5.26 4.40 - 5.90 10e6/uL    Hemoglobin 16.5 13.3 - 17.7 g/dL    Hematocrit 47.9 40.0 - 53.0 %    MCV 91 78 - 100 fL    MCH 31.4 26.5 - 33.0 pg    MCHC 34.4 31.5 - 36.5 g/dL    RDW 13.2 10.0 - 15.0 %    Platelet Count 164 150 - 450 10e3/uL    % Neutrophils 82 %    % Lymphocytes 6 %    % Monocytes 10 %    % Eosinophils 0 %    % Basophils 0 %    % Immature Granulocytes 2 %    NRBCs per 100 WBC 0 <1 /100    Absolute Neutrophils 6.3 1.6 - 8.3 10e3/uL    Absolute Lymphocytes 0.5 (L) 0.8 - 5.3 10e3/uL    Absolute Monocytes 0.8 0.0 - 1.3 10e3/uL    Absolute Eosinophils 0.0 0.0 - 0.7 10e3/uL    Absolute Basophils 0.0 0.0 - 0.2 10e3/uL    Absolute Immature Granulocytes 0.1 <=0.4 10e3/uL    Absolute NRBCs 0.0 10e3/uL   Extra Tube    Narrative    The following orders were created for panel order Extra Tube.  Procedure                               Abnormality         Status                     ---------                               -----------         ------                     Extra Blue Top Tube[[id number]]                              In process                 Extra Red Top Tube[[id number]]                                                            Please view results for these tests on the individual orders.   UA with Microscopic reflex to Culture    Specimen: Urine, Clean Catch   Result Value Ref Range    Color Urine Light Yellow Colorless, Straw, Light Yellow, Yellow    Appearance Urine Clear Clear    Glucose Urine >1000 (A) Negative mg/dL    Bilirubin Urine Negative  Negative    Ketones Urine 60 (A) Negative mg/dL    Specific Gravity Urine 1.033 (H) 1.000 - 1.030    Blood Urine Trace (A) Negative    pH Urine 5.0 5.0 - 9.0    Protein Albumin Urine Negative Negative mg/dL    Urobilinogen Urine Normal Normal, 2.0 mg/dL    Nitrite Urine Negative Negative    Leukocyte Esterase Urine Negative Negative    RBC Urine 1 <=2 /HPF    WBC Urine 1 <=5 /HPF    Narrative    Urine Culture not indicated   XR Chest Port 1 View    Narrative    Procedure:XR CHEST PORT 1 VIEW    Clinical history:Male, 77 years, cough, fever    Technique: Single view was obtained.    Comparison: No relevant prior imaging.    Findings: The cardiac silhouette is normal. The pulmonary vasculature  is normal.    The lungs are clear. Bony structures are unremarkable.      Impression    Impression:   No acute abnormality. No evidence of acute or active cardiopulmonary  disease.    NELIA FREEMAN MD         SYSTEM ID:  [id number]       Medications   acetaminophen (OFIRMEV) infusion 1,000 mg (0 mg Intravenous Stopped 3/21/23 1502)   0.9% sodium chloride BOLUS (0 mLs Intravenous Stopped 3/21/23 1611)       Assessments & Plan (with Medical Decision Making)   Pt nontoxic in NAD. Heart, lung, bowel sounds normal, abd soft, nontender to palpation, nondistended. VSS, febrile.     Lab work appears fairly well with a negative troponin, no leukocytosis, normal hemoglobin normal lactic acid.  Chest x-ray appears well.    However, he is positive for COVID.  I think this would explain a lot of the symptoms that he is currently feeling.    There are no signs of respiratory distress, he is still drinking fluids without any difficulty.  He is greater than 48 hours since symptoms have been gone, he does not appear to be a candidate for antiviral therapy at this time.  Said he feels comfortable going home and I recommend he continue with conservative treatment we will send him home with some cough medications.  I expect gradual improvement of  symptoms in the coming week.    Strict return precautions are given to the pt, they will return if symptoms are worsening or concerning. The pt understands and agrees with the plan and they are discharged.     Jourdan Laughlin PA-C    I have reviewed the nursing notes.    I have reviewed the findings, diagnosis, plan and need for follow up with the patient.                   New Prescriptions    BENZONATATE (TESSALON) 100 MG CAPSULE    Take 1 capsule (100 mg) by mouth 3 times daily as needed for cough    GUAIFENESIN-DEXTROMETHORPHAN (ROBITUSSIN DM) 100-10 MG/5ML SYRUP    Take 10 mLs by mouth 3 times daily as needed for cough       Final diagnoses:   Infection due to 2019 novel coronavirus       3/21/2023   [facility]     Jourdan Laughlin PA  03/21/23 1611

## 2023-03-23 LAB
ATRIAL RATE - MUSE: 97 BPM
DIASTOLIC BLOOD PRESSURE - MUSE: NORMAL MMHG
INTERPRETATION ECG - MUSE: NORMAL
P AXIS - MUSE: 50 DEGREES
PR INTERVAL - MUSE: 158 MS
QRS DURATION - MUSE: 102 MS
QT - MUSE: 360 MS
QTC - MUSE: 457 MS
R AXIS - MUSE: 47 DEGREES
SYSTOLIC BLOOD PRESSURE - MUSE: NORMAL MMHG
T AXIS - MUSE: 53 DEGREES
VENTRICULAR RATE- MUSE: 97 BPM

## 2023-05-18 NOTE — PROGRESS NOTES
Trevor Alexandre, 1945  Chief Complaint   Patient presents with     WOUND CARE     open wound of back       Patient Active Problem List   Diagnosis     Abscess of back     Open wound of back       Concerns/Comments since last visits: is concerned about bridge that came loose from his side.  Vac pressure was maintained.  He thinks it happened while he was sleeping    Objective:     Resolving blister on right side and new blister near disc landing on left side.     04/05/17 1600   Wound 03/27/17 Back   Date First Assessed/Time First Assessed: 03/27/17 0810   Location: Back   Site Assessment Red;Granulation tissue;Pink   Raisa-wound Assessment Erythema  (faint; blanchable)   Size - Length x Width x Depth (cm) 9.6 x 6.7 x 3   Drainage Amount Moderate   Drainage Color/Charcteristics Serous;Yellow;Green   Wound Care/Cleansing Soap and water;Normal saline  (Hibiclens)   Dressing Vacuum based   Dressing Status New dressing   Wound Description (WOC) Full thickness   State of Healing (WOC) Early/partial granulation   Wound/Skin Edges (WOC) Open   Negative Pressure Wound Therapy Back Mid   Placement Date/Time: 03/24/17 0758   Inserted by: Dr De Jesus / Priya Galicia RN  Location: Back  Wound Location Orientation: Mid   Wound Type Surgical   Unit Type KCI   Dressing Type Black foam   Number of pieces used 4   Cycle Continuous   Target Pressure (mmHg) 125   Intensity (low)   Cannister changed? Yes       Procedures:   Wound vac dressing change done.  Removed 5 black foam.  Cleansed with Hibiclens due to some scattered pustules on his back. Raisa wound skin protected with skin barrier prep wipe and Duoderm CGF.  Inserted 2 pieces small black foam + 2 larger pieces = 4 total.  Draped wound.  Confirmed seal check and vac settings (125 mm/hg, low, continuous)  prior to departure.    Mupirocin oint to pustules    Assessments  Measurements are smaller.  Still has slight yellow/green tint to drainage  Blistering near disc  No landing    Plan of care:   TOMAS Damian Nurse Practitioner present to assist in evaluation and development of plan of care  Did not bridge to side this visit - placed disc landing over center of foam.  Padded tubing to prevent pressure when sleeping and sitting  Continue 3X/week wound vac dressing changes.    Supplies provided:  n/a    Education:  Wound care instructions/education provided. Patient verbalized understanding of instruction/education.    Nurse face to face time: 45 min

## 2023-09-24 ENCOUNTER — HEALTH MAINTENANCE LETTER (OUTPATIENT)
Age: 78
End: 2023-09-24

## 2023-09-25 ENCOUNTER — LAB (OUTPATIENT)
Dept: LAB | Facility: OTHER | Age: 78
End: 2023-09-25
Attending: STUDENT IN AN ORGANIZED HEALTH CARE EDUCATION/TRAINING PROGRAM
Payer: MEDICARE

## 2023-09-25 ENCOUNTER — TELEPHONE (OUTPATIENT)
Dept: INTERNAL MEDICINE | Facility: OTHER | Age: 78
End: 2023-09-25

## 2023-09-25 ENCOUNTER — ALLIED HEALTH/NURSE VISIT (OUTPATIENT)
Dept: FAMILY MEDICINE | Facility: OTHER | Age: 78
End: 2023-09-25
Attending: STUDENT IN AN ORGANIZED HEALTH CARE EDUCATION/TRAINING PROGRAM
Payer: MEDICARE

## 2023-09-25 DIAGNOSIS — Z23 NEED FOR PROPHYLACTIC VACCINATION AND INOCULATION AGAINST INFLUENZA: Primary | ICD-10-CM

## 2023-09-25 LAB — HOLD SPECIMEN: NORMAL

## 2023-09-25 PROCEDURE — 90662 IIV NO PRSV INCREASED AG IM: CPT

## 2023-09-25 NOTE — TELEPHONE ENCOUNTER
Left message on answering machine for patient to call back and schedule a appointment before any orders can be made.      Rosey Valente LPN .......  9/25/2023  3:35 PM

## 2023-09-25 NOTE — PROGRESS NOTES
Immunization Documentation  Verified patient's first and last name, and . Stated reason for visit today is to receive influenza vaccine. Denied any concerns with previous immunizations. Allergies reviewed. VIS handout offered, patient declined. Vaccine prepared and administered per standing order. Administration documented in IMMUNIZATIONS (see flowsheet and order for further information). Instructed to wait in lobby for 15 minutes post-injection and notify staff immediately of any reaction.     MARCO VENEGAS RN ....................  2023   2:58 PM

## 2023-09-25 NOTE — PROGRESS NOTES
Dick,   Patient, Irving Alexandre, is coming in for lab work this afternoon and needs orders placed.   Thank you,     Fern Pillai MLT   [phone].                                 Claim  #: [id number].                            DOI: 5/15/2012.

## 2023-09-25 NOTE — TELEPHONE ENCOUNTER
Please call patient and make sure he has an appointment for a visit scheduled.  If he has an appointment for a visit scheduled then I will order labs.    Patient is due for medicare wellness in October and due for multiple screening labs.     Rober Panda MD on 9/25/2023 at 9:52 AM

## 2023-09-25 NOTE — TELEPHONE ENCOUNTER
Left message for patient to schedule Medicare Wellness appointment with Julieta Morales After 10/5/2023    Natalia Waldron on 9/25/2023 at 11:57 AM

## 2023-10-11 ENCOUNTER — MYC MEDICAL ADVICE (OUTPATIENT)
Dept: INTERNAL MEDICINE | Facility: OTHER | Age: 78
End: 2023-10-11
Payer: MEDICARE

## 2023-10-11 DIAGNOSIS — E11.319 TYPE 2 DIABETES MELLITUS WITH RETINOPATHY OF LEFT EYE, WITHOUT LONG-TERM CURRENT USE OF INSULIN, MACULAR EDEMA PRESENCE UNSPECIFIED, UNSPECIFIED RETINOPATHY SEVERITY (H): Primary | ICD-10-CM

## 2023-10-12 NOTE — TELEPHONE ENCOUNTER
Called lab.  They have no record of this.  From their records, March were his last lab draws.      9/25- lab encounter, tube drawn but no order placed.    Patient update on MyChart.    Max'd up A1C order and routing to PCP for signing.     Charlotte Olivas RN on 10/12/2023 at 9:46 AM

## 2023-10-12 NOTE — TELEPHONE ENCOUNTER
"Message left for patient to return call. MyChart reply made. Marianne Dalton RN on 10/12/2023 at 3:26 PM      Per telephone encounter 9/25/23, Dr. Panda wrote:     \"Please call patient and make sure he has an appointment for a visit scheduled.  If he has an appointment for a visit scheduled then I will order labs.     Patient is due for medicare wellness in October and due for multiple screening labs.      Rober Panda MD on 9/25/2023 at 9:52 AM\"    "

## 2023-10-13 NOTE — TELEPHONE ENCOUNTER
I placed another hemoglobin A1c order.  Please just have this noted to draw only A1c for his next lab visit

## 2023-10-16 ENCOUNTER — LAB (OUTPATIENT)
Dept: LAB | Facility: OTHER | Age: 78
End: 2023-10-16
Attending: STUDENT IN AN ORGANIZED HEALTH CARE EDUCATION/TRAINING PROGRAM
Payer: MEDICARE

## 2023-10-16 DIAGNOSIS — E11.319 TYPE 2 DIABETES MELLITUS WITH RETINOPATHY OF LEFT EYE, WITHOUT LONG-TERM CURRENT USE OF INSULIN, MACULAR EDEMA PRESENCE UNSPECIFIED, UNSPECIFIED RETINOPATHY SEVERITY (H): ICD-10-CM

## 2023-10-16 LAB — HBA1C MFR BLD: 6.9 % (ref 4–6.2)

## 2023-10-16 PROCEDURE — 83036 HEMOGLOBIN GLYCOSYLATED A1C: CPT | Mod: ZL

## 2023-10-16 PROCEDURE — 36415 COLL VENOUS BLD VENIPUNCTURE: CPT | Mod: ZL

## 2023-12-03 ENCOUNTER — HEALTH MAINTENANCE LETTER (OUTPATIENT)
Age: 78
End: 2023-12-03

## 2024-01-08 ASSESSMENT — ENCOUNTER SYMPTOMS
HEMATURIA: 0
CHILLS: 0
SHORTNESS OF BREATH: 0
CONSTIPATION: 0
SORE THROAT: 0
PARESTHESIAS: 0
NERVOUS/ANXIOUS: 0
DYSURIA: 0
WEAKNESS: 0
FEVER: 0
EYE PAIN: 0
PALPITATIONS: 0
NAUSEA: 0
DIZZINESS: 0
COUGH: 0
FREQUENCY: 0
JOINT SWELLING: 1
HEADACHES: 0
HEMATOCHEZIA: 0
HEARTBURN: 0
MYALGIAS: 0
ARTHRALGIAS: 1
ABDOMINAL PAIN: 0
DIARRHEA: 0

## 2024-01-08 ASSESSMENT — ACTIVITIES OF DAILY LIVING (ADL): CURRENT_FUNCTION: NO ASSISTANCE NEEDED

## 2024-01-11 ENCOUNTER — OFFICE VISIT (OUTPATIENT)
Dept: INTERNAL MEDICINE | Facility: OTHER | Age: 79
End: 2024-01-11
Attending: STUDENT IN AN ORGANIZED HEALTH CARE EDUCATION/TRAINING PROGRAM
Payer: MEDICARE

## 2024-01-11 VITALS
OXYGEN SATURATION: 97 % | WEIGHT: 205.8 LBS | SYSTOLIC BLOOD PRESSURE: 130 MMHG | RESPIRATION RATE: 16 BRPM | HEART RATE: 81 BPM | HEIGHT: 70 IN | DIASTOLIC BLOOD PRESSURE: 88 MMHG | BODY MASS INDEX: 29.46 KG/M2 | TEMPERATURE: 98.8 F

## 2024-01-11 DIAGNOSIS — I10 BENIGN ESSENTIAL HYPERTENSION: ICD-10-CM

## 2024-01-11 DIAGNOSIS — E78.5 HYPERLIPIDEMIA LDL GOAL <100: ICD-10-CM

## 2024-01-11 DIAGNOSIS — Z00.00 MEDICARE ANNUAL WELLNESS VISIT, SUBSEQUENT: Primary | ICD-10-CM

## 2024-01-11 DIAGNOSIS — R04.0 EPISTAXIS: ICD-10-CM

## 2024-01-11 DIAGNOSIS — E11.319 TYPE 2 DIABETES MELLITUS WITH RETINOPATHY OF LEFT EYE, WITHOUT LONG-TERM CURRENT USE OF INSULIN, MACULAR EDEMA PRESENCE UNSPECIFIED, UNSPECIFIED RETINOPATHY SEVERITY (H): ICD-10-CM

## 2024-01-11 LAB
ALBUMIN SERPL BCG-MCNC: 4.6 G/DL (ref 3.5–5.2)
ALP SERPL-CCNC: 86 U/L (ref 40–150)
ALT SERPL W P-5'-P-CCNC: 26 U/L (ref 0–70)
ANION GAP SERPL CALCULATED.3IONS-SCNC: 10 MMOL/L (ref 7–15)
AST SERPL W P-5'-P-CCNC: 23 U/L (ref 0–45)
BILIRUB SERPL-MCNC: 1.2 MG/DL
BUN SERPL-MCNC: 7.2 MG/DL (ref 8–23)
CALCIUM SERPL-MCNC: 9.4 MG/DL (ref 8.8–10.2)
CHLORIDE SERPL-SCNC: 103 MMOL/L (ref 98–107)
CHOLEST SERPL-MCNC: 121 MG/DL
CREAT SERPL-MCNC: 0.75 MG/DL (ref 0.67–1.17)
DEPRECATED HCO3 PLAS-SCNC: 27 MMOL/L (ref 22–29)
EGFRCR SERPLBLD CKD-EPI 2021: >90 ML/MIN/1.73M2
ERYTHROCYTE [DISTWIDTH] IN BLOOD BY AUTOMATED COUNT: 12.9 % (ref 10–15)
FASTING STATUS PATIENT QL REPORTED: YES
GLUCOSE SERPL-MCNC: 115 MG/DL (ref 70–99)
HBA1C MFR BLD: 6.9 % (ref 4–6.2)
HCT VFR BLD AUTO: 43.9 % (ref 40–53)
HDLC SERPL-MCNC: 42 MG/DL
HGB BLD-MCNC: 15.4 G/DL (ref 13.3–17.7)
LDLC SERPL CALC-MCNC: 63 MG/DL
MCH RBC QN AUTO: 31.1 PG (ref 26.5–33)
MCHC RBC AUTO-ENTMCNC: 35.1 G/DL (ref 31.5–36.5)
MCV RBC AUTO: 89 FL (ref 78–100)
NONHDLC SERPL-MCNC: 79 MG/DL
PLATELET # BLD AUTO: 216 10E3/UL (ref 150–450)
POTASSIUM SERPL-SCNC: 4 MMOL/L (ref 3.4–5.3)
PROT SERPL-MCNC: 7.6 G/DL (ref 6.4–8.3)
RBC # BLD AUTO: 4.95 10E6/UL (ref 4.4–5.9)
SODIUM SERPL-SCNC: 140 MMOL/L (ref 135–145)
TRIGL SERPL-MCNC: 78 MG/DL
WBC # BLD AUTO: 7.8 10E3/UL (ref 4–11)

## 2024-01-11 PROCEDURE — 36415 COLL VENOUS BLD VENIPUNCTURE: CPT | Mod: ZL | Performed by: STUDENT IN AN ORGANIZED HEALTH CARE EDUCATION/TRAINING PROGRAM

## 2024-01-11 PROCEDURE — 99213 OFFICE O/P EST LOW 20 MIN: CPT | Mod: 25 | Performed by: STUDENT IN AN ORGANIZED HEALTH CARE EDUCATION/TRAINING PROGRAM

## 2024-01-11 PROCEDURE — G0439 PPPS, SUBSEQ VISIT: HCPCS | Performed by: STUDENT IN AN ORGANIZED HEALTH CARE EDUCATION/TRAINING PROGRAM

## 2024-01-11 PROCEDURE — G0009 ADMIN PNEUMOCOCCAL VACCINE: HCPCS

## 2024-01-11 PROCEDURE — 85027 COMPLETE CBC AUTOMATED: CPT | Mod: ZL | Performed by: STUDENT IN AN ORGANIZED HEALTH CARE EDUCATION/TRAINING PROGRAM

## 2024-01-11 PROCEDURE — G0463 HOSPITAL OUTPT CLINIC VISIT: HCPCS | Mod: 25

## 2024-01-11 PROCEDURE — 80061 LIPID PANEL: CPT | Mod: ZL | Performed by: STUDENT IN AN ORGANIZED HEALTH CARE EDUCATION/TRAINING PROGRAM

## 2024-01-11 PROCEDURE — 80053 COMPREHEN METABOLIC PANEL: CPT | Mod: ZL | Performed by: STUDENT IN AN ORGANIZED HEALTH CARE EDUCATION/TRAINING PROGRAM

## 2024-01-11 PROCEDURE — 83036 HEMOGLOBIN GLYCOSYLATED A1C: CPT | Mod: ZL | Performed by: STUDENT IN AN ORGANIZED HEALTH CARE EDUCATION/TRAINING PROGRAM

## 2024-01-11 RX ORDER — METFORMIN HCL 500 MG
500 TABLET, EXTENDED RELEASE 24 HR ORAL 2 TIMES DAILY WITH MEALS
Qty: 180 TABLET | Refills: 4 | Status: SHIPPED | OUTPATIENT
Start: 2024-01-11 | End: 2024-08-09

## 2024-01-11 RX ORDER — LISINOPRIL 40 MG/1
40 TABLET ORAL DAILY
Qty: 90 TABLET | Refills: 4 | Status: SHIPPED | OUTPATIENT
Start: 2024-01-11

## 2024-01-11 RX ORDER — ATORVASTATIN CALCIUM 40 MG/1
40 TABLET, FILM COATED ORAL DAILY
Qty: 90 TABLET | Refills: 4 | Status: SHIPPED | OUTPATIENT
Start: 2024-01-11

## 2024-01-11 RX ORDER — GLIPIZIDE 10 MG/1
10 TABLET ORAL
Qty: 180 TABLET | Refills: 4 | Status: SHIPPED | OUTPATIENT
Start: 2024-01-11

## 2024-01-11 ASSESSMENT — ENCOUNTER SYMPTOMS
WEAKNESS: 0
EYE PAIN: 0
NERVOUS/ANXIOUS: 0
SHORTNESS OF BREATH: 0
PARESTHESIAS: 0
DYSURIA: 0
FREQUENCY: 0
PALPITATIONS: 0
JOINT SWELLING: 1
SORE THROAT: 0
CHILLS: 0
NAUSEA: 0
DIZZINESS: 0
ARTHRALGIAS: 1
CONSTIPATION: 0
HEARTBURN: 0
DIARRHEA: 0
HEMATOCHEZIA: 0
ABDOMINAL PAIN: 0
MYALGIAS: 0
HEADACHES: 0
COUGH: 0
HEMATURIA: 0
FEVER: 0

## 2024-01-11 ASSESSMENT — PAIN SCALES - GENERAL: PAINLEVEL: NO PAIN (0)

## 2024-01-11 ASSESSMENT — ACTIVITIES OF DAILY LIVING (ADL): CURRENT_FUNCTION: NO ASSISTANCE NEEDED

## 2024-01-11 NOTE — PROGRESS NOTES
"SUBJECTIVE:   Irving is a 78 year old, presenting for the following:  Medicare Visit (Annual )        1/11/2024     1:28 PM   Additional Questions   Roomed by Virgilio Hoang LPN       Are you in the first 12 months of your Medicare coverage?  No    Healthy Habits:     In general, how would you rate your overall health?  Excellent    Duration of exercise:  Less than 15 minutes    Do you usually eat at least 4 servings of fruit and vegetables a day, include whole grains    & fiber and avoid regularly eating high fat or \"junk\" foods?  Yes    Medication side effects:  Other    Ability to successfully perform activities of daily living:  No assistance needed    Home Safety:  No safety concerns identified    Hearing Impairment:  No hearing concerns    In the past 6 months, have you been bothered by leaking of urine?  No    In general, how would you rate your overall mental or emotional health?  Excellent    Additional concerns today:  Yes      Today's PHQ-2 Score:       1/11/2024     1:25 PM   PHQ-2 ( 1999 [employer])   Q1: Little interest or pleasure in doing things 0   Q2: Feeling down, depressed or hopeless 0   PHQ-2 Score 0   Q1: Little interest or pleasure in doing things Not at all   Q2: Feeling down, depressed or hopeless Not at all   PHQ-2 Score 0           Have you ever done Advance Care Planning? (For example, a Health Directive, POLST, or a discussion with a medical provider or your loved ones about your wishes): No, advance care planning information given to patient to review.  Patient declined advance care planning discussion at this time.       Fall risk  Fallen 2 or more times in the past year?: No  Any fall with injury in the past year?: No    Cognitive Screening   1) Repeat 3 items (Leader, Season, Table)    2) Clock draw: NORMAL  3) 3 item recall: Recalls 1 object   Results: NORMAL clock, 1-2 items recalled: COGNITIVE IMPAIRMENT LESS LIKELY    Mini-CogTM Copyright S [employer]. Licensed by the author for use in " [facility]; reprinted with permission (felicitascarleen@[facility]). All rights reserved.            Diabetes: a little higher since stopping jardiance        Do you have sleep apnea, excessive snoring or daytime drowsiness? : no    Reviewed and updated as needed this visit by clinical staff   Tobacco  Allergies  Meds    Surg Hx           Reviewed and updated as needed this visit by Provider        Surg Hx          Social History     Tobacco Use    Smoking status: Former     Types: Cigarettes     Quit date: 3/27/2002     Years since quittin.8    Smokeless tobacco: Never   Substance Use Topics    Alcohol use: Yes     Comment: occasionally             2024    11:05 AM   Alcohol Use   Prescreen: >3 drinks/day or >7 drinks/week? No     Do you have a current opioid prescription? No  Do you use any other controlled substances or medications that are not prescribed by a provider? None        Having trouble with nose bleeds.   Using petroleum in the inside of his nose.   He is also using a humidifier.  Wondering what of those things can be done to prevent nosebleeds.      Current providers sharing in care for this patient include:   Patient Care Team:  Rober Panda MD as PCP - General (Internal Medicine)  Rober Panda MD as Assigned PCP    The following health maintenance items are reviewed in Epic and correct as of today:  Health Maintenance   Topic Date Due    DTAP/TDAP/TD IMMUNIZATION (1 - Tdap) Never done    ZOSTER IMMUNIZATION (1 of 2) Never done    RSV VACCINE (Pregnancy & 60+) (1 - 1-dose 60+ series) Never done    EYE EXAM  2022    COLORECTAL CANCER SCREENING  2022    COVID-19 Vaccine ( season) 2023    MICROALBUMIN  10/05/2023    DIABETIC FOOT EXAM  10/05/2023    A1C  2024    MEDICARE ANNUAL WELLNESS VISIT  2025    BMP  2025    LIPID  2025    FALL RISK ASSESSMENT  2025    ADVANCE CARE PLANNING  2029    PHQ-2 (once per calendar year)   "Completed    INFLUENZA VACCINE  Completed    Pneumococcal Vaccine: 65+ Years  Completed    HEPATITIS C SCREENING  Addressed    IPV IMMUNIZATION  Aged Out    HPV IMMUNIZATION  Aged Out    MENINGITIS IMMUNIZATION  Aged Out    RSV MONOCLONAL ANTIBODY  Aged Out     Lab work is in process  Labs reviewed in EPIC          Review of Systems   Constitutional:  Negative for chills and fever.   HENT:  Negative for congestion, ear pain, hearing loss and sore throat.    Eyes:  Negative for pain and visual disturbance.   Respiratory:  Negative for cough and shortness of breath.    Cardiovascular:  Negative for chest pain, palpitations and peripheral edema.   Gastrointestinal:  Negative for abdominal pain, constipation, diarrhea, heartburn, hematochezia and nausea.   Genitourinary:  Negative for dysuria, frequency, genital sores, hematuria, impotence, penile discharge and urgency.   Musculoskeletal:  Positive for arthralgias and joint swelling. Negative for myalgias.   Skin:  Negative for rash.   Neurological:  Negative for dizziness, weakness, headaches and paresthesias.   Psychiatric/Behavioral:  Negative for mood changes. The patient is not nervous/anxious.          OBJECTIVE:   /88   Pulse 81   Temp 98.8  F (37.1  C) (Temporal)   Resp 16   Ht 1.778 m (5' 10\")   Wt 93.4 kg (205 lb 12.8 oz)   SpO2 97%   BMI 29.53 kg/m   Estimated body mass index is 29.53 kg/m  as calculated from the following:    Height as of this encounter: 1.778 m (5' 10\").    Weight as of this encounter: 93.4 kg (205 lb 12.8 oz).  Physical Exam  Vitals and nursing note reviewed.   Constitutional:       General: He is not in acute distress.     Appearance: He is well-developed. He is not diaphoretic.   HENT:      Head: Normocephalic and atraumatic.      Comments: Unable to appreciate any sores in the anterior nasal septum.     Right Ear: Tympanic membrane, ear canal and external ear normal.      Left Ear: Tympanic membrane, ear canal and external " ear normal.      Mouth/Throat:      Mouth: Mucous membranes are moist.      Pharynx: Oropharynx is clear.   Eyes:      General: No scleral icterus.     Conjunctiva/sclera: Conjunctivae normal.   Cardiovascular:      Rate and Rhythm: Normal rate and regular rhythm.      Heart sounds: No murmur heard.  Pulmonary:      Effort: Pulmonary effort is normal.      Breath sounds: Normal breath sounds. No wheezing.   Abdominal:      Palpations: Abdomen is soft. There is no mass.      Tenderness: There is no abdominal tenderness.   Musculoskeletal:         General: No deformity.      Cervical back: Neck supple.   Lymphadenopathy:      Cervical: No cervical adenopathy.   Skin:     General: Skin is warm and dry.      Coloration: Skin is not jaundiced.      Findings: No rash.   Neurological:      Mental Status: He is alert and oriented to person, place, and time. Mental status is at baseline.   Psychiatric:         Mood and Affect: Mood normal.         Behavior: Behavior normal.         Thought Content: Thought content normal.           Diagnostic Test Results:  Labs reviewed in Epic    ASSESSMENT / PLAN:       ICD-10-CM    1. Medicare annual wellness visit, subsequent  Z00.00 CBC with platelets     Comprehensive metabolic panel     CBC with platelets     Comprehensive metabolic panel      2. Hyperlipidemia LDL goal <100  E78.5 atorvastatin (LIPITOR) 40 MG tablet     Lipid Profile     Lipid Profile      3. 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 Epistaxis  R04.0       5. Benign essential hypertension  I10       Medicare wellness exam: Updated past medical history surgical history social history and medications.  Check basic labs as above.  He has  "elected to graduate from colonoscopy and I think this is certainly appropriate given his age.  He is no longer checking his PSA so we will not check this today as well.  He continues to take atorvastatin for hyperlipidemia and is well-controlled at this time.    Type 2 diabetes: Recheck hemoglobin A1c today.  Continue metformin he will try taking it only with his evening meal to help reduce the incidence of diarrhea.  Continue lisinopril 40 mg daily without change.  Blood pressure with reasonable control.  Continue glipizide 10 mg twice daily.    Epistaxis: Only present in the winter and he was able to stop a nosebleed before I entered the room today.  Encouraged him to continue using humidifier, applying Vaseline to the inside of his nares on any irritated areas twice daily and to use saline nasal spray and if he is to develop a nosebleed we talked about management techniques including oxymetazoline nasal spray.  If his nosebleeds do not improve he will contact me and I will place referral to ENT.    Patient has been advised of split billing requirements and indicates understanding: Yes      COUNSELING:  Reviewed preventive health counseling, as reflected in patient instructions      BMI:   Estimated body mass index is 29.53 kg/m  as calculated from the following:    Height as of this encounter: 1.778 m (5' 10\").    Weight as of this encounter: 93.4 kg (205 lb 12.8 oz).         He reports that he quit smoking about 21 years ago. His smoking use included cigarettes. He has never used smokeless tobacco.      Appropriate preventive services were discussed with this patient, including applicable screening as appropriate for fall prevention, nutrition, physical activity, Tobacco-use cessation, weight loss and cognition.  Checklist reviewing preventive services available has been given to the patient.    Rober Panda MD  [facility]    Identified Health Risks:  I have reviewed Opioid Use Disorder " and Substance Use Disorder risk factors and made any needed referrals.

## 2024-01-11 NOTE — LETTER
January 11, 2024      Irving Alexandre  [id number]  [facility] [id number]        Dear ,    We are writing to inform you of your test results.    Your laboratory results are below.  Your cholesterol is under excellent control.  Your blood counts are completely normal as well as your kidney function electrolytes.  Your hemoglobin A1c is under excellent control at 6.9%.  No medication changes are needed at this time.  You may try adjusting your metformin as we discussed in clinic to see if it improves your diarrhea by either taking both of your metformin tablets with your evening meal or discontinuing the morning dose altogether.  Assuming that your blood sugars remain under good control and you are able to take both doses your metformin I will see you in 1 year.  If you do stop the metformin I would like to see you in 6 months for follow-up of your diabetes.    Resulted Orders   Lipid Profile   Result Value Ref Range    Cholesterol 121 <200 mg/dL    Triglycerides 78 <150 mg/dL    Direct Measure HDL 42 >=40 mg/dL    LDL Cholesterol Calculated 63 <=100 mg/dL    Non HDL Cholesterol 79 <130 mg/dL    Patient Fasting > 8hrs? Yes     Narrative    Cholesterol  Desirable:  <200 mg/dL    Triglycerides  Normal:  Less than 150 mg/dL  Borderline High:  150-199 mg/dL  High:  200-499 mg/dL  Very High:  Greater than or equal to 500 mg/dL    Direct Measure HDL  Female:  Greater than or equal to 50 mg/dL   Male:  Greater than or equal to 40 mg/dL    LDL Cholesterol  Desirable:  <100mg/dL  Above Desirable:  100-129 mg/dL   Borderline High:  130-159 mg/dL   High:  160-189 mg/dL   Very High:  >= 190 mg/dL    Non HDL Cholesterol  Desirable:  130 mg/dL  Above Desirable:  130-159 mg/dL  Borderline High:  160-189 mg/dL  High:  190-219 mg/dL  Very High:  Greater than or equal to 220 mg/dL   CBC with platelets   Result Value Ref Range    WBC Count 7.8 4.0 - 11.0 10e3/uL    RBC Count 4.95 4.40 - 5.90 10e6/uL    Hemoglobin 15.4 13.3 - 17.7  g/dL    Hematocrit 43.9 40.0 - 53.0 %    MCV 89 78 - 100 fL    MCH 31.1 26.5 - 33.0 pg    MCHC 35.1 31.5 - 36.5 g/dL    RDW 12.9 10.0 - 15.0 %    Platelet Count 216 150 - 450 10e3/uL   Comprehensive metabolic panel   Result Value Ref Range    Sodium 140 135 - 145 mmol/L      Comment:      Reference intervals for this test were updated on 09/26/2023 to more accurately reflect our healthy population. There may be differences in the flagging of prior results with similar values performed with this method. Interpretation of those prior results can be made in the context of the updated reference intervals.     Potassium 4.0 3.4 - 5.3 mmol/L    Carbon Dioxide (CO2) 27 22 - 29 mmol/L    Anion Gap 10 7 - 15 mmol/L    Urea Nitrogen 7.2 (L) 8.0 - 23.0 mg/dL    Creatinine 0.75 0.67 - 1.17 mg/dL    GFR Estimate >90 >60 mL/min/1.73m2    Calcium 9.4 8.8 - 10.2 mg/dL    Chloride 103 98 - 107 mmol/L    Glucose 115 (H) 70 - 99 mg/dL    Alkaline Phosphatase 86 40 - 150 U/L      Comment:      Reference intervals for this test were updated on 11/14/2023 to more accurately reflect our healthy population. There may be differences in the flagging of prior results with similar values performed with this method. Interpretation of those prior results can be made in the context of the updated reference intervals.    AST 23 0 - 45 U/L      Comment:      Reference intervals for this test were updated on 6/12/2023 to more accurately reflect our healthy population. There may be differences in the flagging of prior results with similar values performed with this method. Interpretation of those prior results can be made in the context of the updated reference intervals.    ALT 26 0 - 70 U/L      Comment:      Reference intervals for this test were updated on 6/12/2023 to more accurately reflect our healthy population. There may be differences in the flagging of prior results with similar values performed with this method. Interpretation of those  prior results can be made in the context of the updated reference intervals.      Protein Total 7.6 6.4 - 8.3 g/dL    Albumin 4.6 3.5 - 5.2 g/dL    Bilirubin Total 1.2 <=1.2 mg/dL   Hemoglobin A1c   Result Value Ref Range    Hemoglobin A1C 6.9 (H) 4.0 - 6.2 %       If you have any questions or concerns, please call the clinic at the number listed above.       Sincerely,      Rober Panda MD

## 2024-01-11 NOTE — NURSING NOTE
"Chief Complaint   Patient presents with    Medicare Visit     Annual        Initial /88   Pulse 81   Temp 98.8  F (37.1  C) (Temporal)   Resp 16   Ht 1.778 m (5' 10\")   Wt 93.4 kg (205 lb 12.8 oz)   SpO2 97%   BMI 29.53 kg/m   Estimated body mass index is 29.53 kg/m  as calculated from the following:    Height as of this encounter: 1.778 m (5' 10\").    Weight as of this encounter: 93.4 kg (205 lb 12.8 oz).  Medication Review: complete    The next two questions are to help us understand your food security.  If you are feeling you need any assistance in this area, we have resources available to support you today.          1/8/2024   SDOH- Food Insecurity   Within the past 12 months, did you worry that your food would run out before you got money to buy more? N   Within the past 12 months, did the food you bought just not last and you didn t have money to get more? N         Health Care Directive:  Patient does not have a Health Care Directive or Living Will: Discussed advance care planning with patient; however, patient declined at this time.    Lulu Hoang LPN      "

## 2024-01-18 ENCOUNTER — HOSPITAL ENCOUNTER (EMERGENCY)
Facility: OTHER | Age: 79
Discharge: HOME | End: 2024-01-18
Payer: MEDICARE

## 2024-01-18 VITALS
HEART RATE: 88 BPM | HEIGHT: 70 IN | TEMPERATURE: 97.7 F | RESPIRATION RATE: 19 BRPM | SYSTOLIC BLOOD PRESSURE: 175 MMHG | OXYGEN SATURATION: 99 % | WEIGHT: 206 LBS | BODY MASS INDEX: 29.49 KG/M2 | DIASTOLIC BLOOD PRESSURE: 81 MMHG

## 2024-01-18 DIAGNOSIS — R04.0 EPISTAXIS: ICD-10-CM

## 2024-01-18 PROCEDURE — 99282 EMERGENCY DEPT VISIT SF MDM: CPT

## 2024-01-18 PROCEDURE — 99283 EMERGENCY DEPT VISIT LOW MDM: CPT

## 2024-01-18 NOTE — DISCHARGE INSTRUCTIONS
Irving,   ENT referral placed. They should call you for an appointment, however, if they do not call you by Monday, please call number listed on the referral.   Return to ER if you cannot get your nose to stop bleeding at home with pressure. If you are bleeding, and become dizzy/lightheaded, please come in to be seen.   Radhika Jordan CNP

## 2024-01-18 NOTE — ED PROVIDER NOTES
"  History     Chief Complaint   Patient presents with    Epistaxis     HPI  Trevor Alexandre is a 78 year old male who presents into the ER today complaining of a nose bleed from his left nare that started at around 1100 this morning. It stopped on its own a couple f times but off and on it oozes. He reports he has been dealing with nose bleeds \"since he was young\" , they stopped for about 30 years, then they started back up again 6 months ago. He has nose bleeds almost daily, they go away with some pressure or just go away on their own. He has talked to his primary provider about this in the past, he was given a nasal spray, which he says \"made it worse\". Sometimes Vaseline helps when it puts it in his nose. He uses humidity at home. He has never seen ENT. He may have had cautery when he was younger, but he does not recall. He has never been to the ER for nose bleeds before. He spoke to a friend today, who advised him to come into the ER today to be seen. He denies dizziness, lightheadedness, nausea. He does not feel the blood go down the back of his throat.     1/11/23 Patient was seen for a medicare annual wellness check. Discussed nose bleeds with Dr. Panda at that visit, advised to use humidifier, petroleum,saline nasal spray. They discussed ENT referral if the nose bleeds do not improve. Hemoglobin 15.4 platelets 216.    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, without long-term current use of insulin, macular edema presence unspecified, unspecified retinopathy severity (H) 02/12/2018     Priority: Medium    Benign essential hypertension 02/12/2018     Priority: Medium    Hyperlipidemia LDL goal <100 02/12/2018     Priority: Medium        Past Medical History:    Past " Medical History:   Diagnosis Date    Abscess of back 2017    Diabetes mellitus, type 2 (H)     Diabetic retinopathy (H)     Hx of adenomatous colonic polyps     Hyperlipidemia     Hypertension     Inflammatory arthritis        Past Surgical History:    Past Surgical History:   Procedure Laterality Date    ARTHROSCOPY SHOULDER RT/LT Bilateral     CHOLECYSTECTOMY      COLONOSCOPY  2014    Adenomatous polyps    COLONOSCOPY N/A 2019    adenomas, follow up in     EXAM UNDER ANESTHESIA, CHANGE DRESSING (LOCATION), COMBINED N/A 3/24/2017    Procedure: COMBINED EXAM UNDER ANESTHESIA, CHANGE DRESSING (LOCATION);  Surgeon: Tahir De Jesus DO;  Location: [facility] OR    EXCISE LESION BACK N/A 3/22/2017    Procedure: EXCISE LESION BACK;  Surgeon: Servando Dale MD;  Location: [facility] OR    FINGER SURGERY Right     Middle    HEMORRHOIDECTOMY BANDING N/A 2019    Procedure: Hemorrhoid banding;  Surgeon: Velvet Lees MD;  Location: [facility] OR    IRRIGATION AND DEBRIDEMENT TRUNK, COMBINED N/A 3/23/2017    Procedure: COMBINED IRRIGATION AND DEBRIDEMENT TRUNK;  Surgeon: Servando Dale MD;  Location: [facility] OR       Family History:    Family History   Problem Relation Age of Onset    Heart Disease Mother         MI    Alzheimer Disease Father     Alzheimer Disease Brother     Other - See Comments Brother         CNS aneurysm       Social History:  Marital Status:   [5]  Social History     Tobacco Use    Smoking status: Former     Types: Cigarettes     Quit date: 3/27/2002     Years since quittin.8    Smokeless tobacco: Never   Vaping Use    Vaping Use: Never used   Substance Use Topics    Alcohol use: Yes     Comment: occasionally    Drug use: No        Medications:    atorvastatin (LIPITOR) 40 MG tablet  glipiZIDE (GLUCOTROL) 10 MG tablet  lisinopril (ZESTRIL) 40 MG tablet  metFORMIN (GLUCOPHAGE XR) 500 MG 24 hr tablet          Review of Systems   HENT:  Positive for nosebleeds.    All other systems reviewed  "and are negative.      Physical Exam   BP: (!) 175/81  Pulse: 88  Temp: 97.7  F (36.5  C)  Resp: 19  Height: 177.8 cm (5' 10\")  Weight: 93.4 kg (206 lb)  SpO2: 99 %      Physical Exam  Vitals and nursing note reviewed.   Constitutional:       General: He is not in acute distress.  HENT:      Nose: No nasal deformity, septal deviation, nasal tenderness, mucosal edema or rhinorrhea.      Right Nostril: No foreign body or epistaxis.      Left Nostril: No foreign body or epistaxis.      Comments: Observation of anterior nares shows erythema, no active bleeding  Neurological:      Mental Status: He is alert.   Psychiatric:         Behavior: Behavior is cooperative.         ED Course              No results found for this or any previous visit (from the past 24 hour(s)).    Medications - No data to display    Assessments & Plan (with Medical Decision Making)  Trevor Alexandre is a 78 year old male who presents into the ER today complaining of a nose bleed from his left nare that started at around 1100 this morning. It stopped on its own a couple f times but off and on it oozes. He reports he has been dealing with nose bleeds \"since he was young\" , they stopped for about 30 years, then they started back up again 6 months ago. He has nose bleeds almost daily, they go away with some pressure or just go away on their own. He has talked to his primary provider about this in the past, he was given a nasal spray, which he says \"made it worse\". Sometimes Vaseline helps when it puts it in his nose. He uses humidity at home. He has never seen ENT. He may have had cautery when he was younger, but he does not recall. He has never been to the ER for nose bleeds before. He spoke to a friend today, who advised him to come into the ER today to be seen. He denies dizziness, lightheadedness, nausea. He does not feel the blood go down the back of his throat.   1/11/23 Patient was seen for a medicare annual wellness check. Discussed nose " "bleeds with Dr. Panda at that visit, advised to use humidifier, petroleum,saline nasal spray. They discussed ENT referral if the nose bleeds do not improve. Hemoglobin 15.4 platelets 216.  BP (!) 175/81   Pulse 88   Temp 97.7  F (36.5  C) (Tympanic)   Resp 19   Ht 1.778 m (5' 10\")   Wt 93.4 kg (206 lb)   SpO2 99%   BMI 29.56 kg/m     on room air  Erythema to anterior nares, possibly some scant blood to left septal area, which it did use silver nitrate to.Patient denies pain. Denies dizziness, lightheadedness. Denies facial trauma. This seems to be a chronic issue, discussed with primary provider. Hemoglobin platelets stable. No blood thinners.   Will monitor patient for ER for further bleeding.   2:15 PM no active nose bleed  2:45 PM Patient is not actively bleeding from the nose. Was able to walk around without causing bleeding. No further interventions.   Patient to discharge home in stable condition. Apply direct pressure to nose if bleeding using clamp. Continue with humidifier, petroleum jelly. Do not pick nose or place things in your nose. If unable to get the bleeding to stop, please return to ER to be seen. ENT referral placed.      I have reviewed the nursing notes.    I have reviewed the findings, diagnosis, plan and need for follow up with the patient.    Medical Decision Making  The patient's presentation was of straightforward complexity (a clearly self-limited or minor problem).    The patient's evaluation involved:  an assessment requiring an independent historian (see separate area of note for details)  review of external note(s) from 1 sources (see separate area of note for details)    The patient's management necessitated only low risk treatment.        New Prescriptions    No medications on file       Final diagnoses:   Epistaxis       1/18/2024   [facility] APRN CNP  01/18/24 1453    "

## 2024-01-18 NOTE — ED TRIAGE NOTES
"Pt here from home. Complaints of nose bleed 0600 till now hasn't stopped bleeding. He has been having a chronic issue for last 6 months of bloody nose and now its not stopping. Goes away for and comes right back. MD in past has mentioned cauterization. Denies dizziness and light headedness, not on blood thinners. Nose clamp placed in triage. Bleeding under control  BP (!) 175/81   Pulse 88   Temp 97.7  F (36.5  C) (Tympanic)   Resp 19   Ht 1.778 m (5' 10\")   Wt 93.4 kg (206 lb)   SpO2 99%   BMI 29.56 kg/m         Triage Assessment (Adult)       Row Name 01/18/24 1337          Triage Assessment    Airway WDL WDL        Respiratory WDL    Respiratory WDL WDL        Skin Circulation/Temperature WDL    Skin Circulation/Temperature WDL WDL        Cardiac WDL    Cardiac WDL WDL        Peripheral/Neurovascular WDL    Peripheral Neurovascular WDL WDL        Cognitive/Neuro/Behavioral WDL    Cognitive/Neuro/Behavioral WDL WDL        [place] Coma Scale    Best Eye Response 4-->(E4) spontaneous     Best Motor Response 6-->(M6) obeys commands     Best Verbal Response 5-->(V5) oriented     [place] Coma Scale Score 15                     "

## 2024-08-08 ENCOUNTER — NURSE TRIAGE (OUTPATIENT)
Dept: INTERNAL MEDICINE | Facility: OTHER | Age: 79
End: 2024-08-08
Payer: MEDICARE

## 2024-08-08 NOTE — TELEPHONE ENCOUNTER
"Patient tested BG while on the phone with triager and it reduced to 260. Per last office visit note 1/11/24, \"Type 2 diabetes: Recheck hemoglobin A1c today. Continue metformin he will try taking it only with his evening meal to help reduce the incidence of diarrhea.\" Patient reports he continues having diarrhea stools daily. He had not experimented with this yet and states he will do so. Home care advice given per protocol. Patient also described a one-time incident last night which resembled restless leg syndrome no longer present today. Patient will monitor for continued symptoms and call for an appointment if needed. Marianne Dalton RN on 8/8/2024 at 10:04 AM      Reason for Disposition   Blood glucose 240 - 300 mg/dL (13.3 - 16.7 mmol/L)    Additional Information   Negative: Unconscious or difficult to awaken   Negative: Acting confused (e.g., disoriented, slurred speech)   Negative: Very weak (can't stand)   Negative: Sounds like a life-threatening emergency to the triager   Negative: Vomiting and signs of dehydration (e.g., very dry mouth, lightheaded, dark urine)   Negative: Blood glucose > 240 mg/dL (13.3 mmol/L) and rapid breathing   Negative: Blood glucose > 500 mg/dL (27.8 mmol/L)   Negative: Blood glucose > 240 mg/dL (13.3 mmol/L) AND urine ketones moderate-large (or more than 1+)   Negative: Blood glucose > 240 mg/dL (13.3 mmol/L) and blood ketones > 1.4 mmol/L   Negative: Blood glucose > 240 mg/dL (13.3 mmol/L) AND vomiting AND unable to check for ketones (in blood or urine)   Negative: Vomiting lasting > 4 hours   Negative: Patient sounds very sick or weak to the triager   Negative: Fever > 100.4 F (38.0 C)   Negative: Caller has URGENT medication or insulin pump question and triager unable to answer question   Negative: Blood glucose > 400 mg/dL (22.2 mmol/L)   Negative: Blood glucose > 300 mg/dL (16.7 mmol/L) AND two or more times in a row   Negative: Urine ketones moderate - large (or blood ketones " "> 1.4 mmol/L)   Negative: Symptoms of high blood sugar (e.g., abnormally thirsty, frequent urination, weight loss) and not able to test blood glucose, AND pregnant   Negative: Blood glucose > 240 mg/dL (13.3 mmol/L) AND pregnant   Negative: Symptoms of high blood sugar (e.g., abnormally thirsty, frequent urination, weight loss) and not able to test blood glucose   Negative: New-onset diabetes mellitus suspected (e.g., frequent urination, weak, weight loss)   Negative: Patient wants to be seen   Negative: Caller has NON-URGENT medication question about med that PCP prescribed and triager unable to answer question   Negative: Blood glucose > 300 mg/dL (16.7 mmol/L)    Answer Assessment - Initial Assessment Questions  1. BLOOD GLUCOSE: \"What is your blood glucose level?\"       9:15      2. ONSET: \"When did you check the blood glucose?\"      As above     3. USUAL RANGE: \"What is your glucose level usually?\" (e.g., usual fasting morning value, usual evening value)      200 or higher     4. KETONES: \"Do you check for ketones (urine or blood test strips)?\" If Yes, ask: \"What does the test show now?\"       Denies     5. TYPE 1 or 2:  \"Do you know what type of diabetes you have?\"  (e.g., Type 1, Type 2, Gestational; doesn't know)       2     6. INSULIN: \"Do you take insulin?\" \"What type of insulin(s) do you use? What is the mode of delivery? (syringe, pen; injection or pump)?\"       Only oral     7. DIABETES PILLS: \"Do you take any pills for your diabetes?\" If Yes, ask: \"Have you missed taking any pills recently?\"      Per med list     8. OTHER SYMPTOMS: \"Do you have any symptoms?\" (e.g., fever, frequent urination, difficulty breathing, dizziness, weakness, vomiting)      Diarrhea has been going on for months and is taking an antidiarrheal medication daily     9. PREGNANCY: \"Is there any chance you are pregnant?\" \"When was your last menstrual period?\"      no    Protocols used: Diabetes - High Blood Sugar-A-OH    "

## 2024-08-09 ENCOUNTER — MYC MEDICAL ADVICE (OUTPATIENT)
Dept: INTERNAL MEDICINE | Facility: OTHER | Age: 79
End: 2024-08-09
Payer: MEDICARE

## 2024-08-09 DIAGNOSIS — E11.319 TYPE 2 DIABETES MELLITUS WITH RETINOPATHY OF LEFT EYE, WITHOUT LONG-TERM CURRENT USE OF INSULIN, MACULAR EDEMA PRESENCE UNSPECIFIED, UNSPECIFIED RETINOPATHY SEVERITY (H): ICD-10-CM

## 2024-08-09 RX ORDER — METFORMIN HCL 500 MG
500 TABLET, EXTENDED RELEASE 24 HR ORAL
Qty: 180 TABLET | Refills: 4 | Status: SHIPPED | OUTPATIENT
Start: 2024-08-09

## 2024-08-09 NOTE — TELEPHONE ENCOUNTER
Per OV from 1/11/24:    Type 2 diabetes: Recheck hemoglobin A1c today. Continue metformin he will try taking it only with his evening meal to help reduce the incidence of diarrhea. Continue lisinopril 40 mg daily without change. Blood pressure with reasonable control. Continue glipizide 10 mg twice daily.     Urmila Ramírez RN on 8/9/2024 at 9:22 AM

## 2024-09-08 ENCOUNTER — HEALTH MAINTENANCE LETTER (OUTPATIENT)
Age: 79
End: 2024-09-08

## 2024-10-04 ENCOUNTER — DOCUMENTATION ONLY (OUTPATIENT)
Dept: INTERNAL MEDICINE | Facility: OTHER | Age: 79
End: 2024-10-04
Payer: MEDICARE

## 2024-10-04 DIAGNOSIS — E11.319 TYPE 2 DIABETES MELLITUS WITH RETINOPATHY OF LEFT EYE, WITHOUT LONG-TERM CURRENT USE OF INSULIN, MACULAR EDEMA PRESENCE UNSPECIFIED, UNSPECIFIED RETINOPATHY SEVERITY (H): Primary | ICD-10-CM

## 2024-10-04 NOTE — PROGRESS NOTES
Patient is due for an A1C check. He does have an appointment scheduled for lab.  Please sign if appropriate.  Vicki Herndon LPN on 10/4/2024 at 1:30 PM  EXT. [phone]

## 2024-10-04 NOTE — PROGRESS NOTES
Trevor Alexandre has an upcoming lab appointment. The note states it is for an A1C and there are no orders available. Please review and place future orders, as appropriate.    Brionna Kirkpatrick

## 2024-10-07 ENCOUNTER — LAB (OUTPATIENT)
Dept: LAB | Facility: OTHER | Age: 79
End: 2024-10-07
Attending: STUDENT IN AN ORGANIZED HEALTH CARE EDUCATION/TRAINING PROGRAM
Payer: MEDICARE

## 2024-10-07 DIAGNOSIS — E11.319 TYPE 2 DIABETES MELLITUS WITH RETINOPATHY OF LEFT EYE, WITHOUT LONG-TERM CURRENT USE OF INSULIN, MACULAR EDEMA PRESENCE UNSPECIFIED, UNSPECIFIED RETINOPATHY SEVERITY (H): ICD-10-CM

## 2024-10-07 LAB
ALBUMIN SERPL BCG-MCNC: 4.7 G/DL (ref 3.5–5.2)
ALP SERPL-CCNC: 99 U/L (ref 40–150)
ALT SERPL W P-5'-P-CCNC: 25 U/L (ref 0–70)
ANION GAP SERPL CALCULATED.3IONS-SCNC: 12 MMOL/L (ref 7–15)
AST SERPL W P-5'-P-CCNC: 22 U/L (ref 0–45)
BILIRUB SERPL-MCNC: 1.2 MG/DL
BUN SERPL-MCNC: 9 MG/DL (ref 8–23)
CALCIUM SERPL-MCNC: 10.2 MG/DL (ref 8.8–10.4)
CHLORIDE SERPL-SCNC: 100 MMOL/L (ref 98–107)
CHOLEST SERPL-MCNC: 118 MG/DL
CREAT SERPL-MCNC: 0.86 MG/DL (ref 0.67–1.17)
EGFRCR SERPLBLD CKD-EPI 2021: 89 ML/MIN/1.73M2
ERYTHROCYTE [DISTWIDTH] IN BLOOD BY AUTOMATED COUNT: 13.4 % (ref 10–15)
EST. AVERAGE GLUCOSE BLD GHB EST-MCNC: 143 MG/DL
FASTING STATUS PATIENT QL REPORTED: YES
FASTING STATUS PATIENT QL REPORTED: YES
GLUCOSE SERPL-MCNC: 158 MG/DL (ref 70–99)
HBA1C MFR BLD: 6.6 %
HCO3 SERPL-SCNC: 27 MMOL/L (ref 22–29)
HCT VFR BLD AUTO: 49.8 % (ref 40–53)
HDLC SERPL-MCNC: 43 MG/DL
HGB BLD-MCNC: 16.6 G/DL (ref 13.3–17.7)
LDLC SERPL CALC-MCNC: 57 MG/DL
MCH RBC QN AUTO: 30.7 PG (ref 26.5–33)
MCHC RBC AUTO-ENTMCNC: 33.3 G/DL (ref 31.5–36.5)
MCV RBC AUTO: 92 FL (ref 78–100)
NONHDLC SERPL-MCNC: 75 MG/DL
PLATELET # BLD AUTO: 231 10E3/UL (ref 150–450)
POTASSIUM SERPL-SCNC: 4.6 MMOL/L (ref 3.4–5.3)
PROT SERPL-MCNC: 8.2 G/DL (ref 6.4–8.3)
RBC # BLD AUTO: 5.41 10E6/UL (ref 4.4–5.9)
SODIUM SERPL-SCNC: 139 MMOL/L (ref 135–145)
TRIGL SERPL-MCNC: 90 MG/DL
TSH SERPL DL<=0.005 MIU/L-ACNC: 1.12 UIU/ML (ref 0.3–4.2)
WBC # BLD AUTO: 8 10E3/UL (ref 4–11)

## 2024-10-07 PROCEDURE — 84443 ASSAY THYROID STIM HORMONE: CPT | Mod: ZL

## 2024-10-07 PROCEDURE — 80061 LIPID PANEL: CPT | Mod: ZL

## 2024-10-07 PROCEDURE — 36415 COLL VENOUS BLD VENIPUNCTURE: CPT | Mod: ZL

## 2024-10-07 PROCEDURE — 82310 ASSAY OF CALCIUM: CPT | Mod: ZL

## 2024-10-07 PROCEDURE — 83036 HEMOGLOBIN GLYCOSYLATED A1C: CPT | Mod: ZL

## 2024-10-07 PROCEDURE — 85027 COMPLETE CBC AUTOMATED: CPT | Mod: ZL

## 2024-10-07 PROCEDURE — 82947 ASSAY GLUCOSE BLOOD QUANT: CPT | Mod: ZL

## 2024-10-23 NOTE — MR AVS SNAPSHOT
After Visit Summary   4/27/2017    Trevor Alexandre    MRN: [id number]           Patient Information     Date Of Birth          1945        Visit Information        Provider Department      4/27/2017 1:00 PM Wells, Loly L, NP [facility]        Today's Diagnoses     Dehiscence of surgical wound, subsequent encounter    -  1      Care Instructions    Continue with Monday and Thursday for [facility] dressing changes [facility].      Please call if any questions/concerns and/or problems ([phone])    Follow up as previously scheduled.        Please follow up with your [facility] provider to address right leg/back pain    Three building blocks of wound healing  1. Protein (if not contraindicated)  2. Vitamin C 500 mg oral twice a day  3.  Zinc 220 mg oral daily           Follow-ups after your visit        Follow-up notes from your care team     Return in about 4 days (around 5/1/2017).      Your next 10 appointments already scheduled     May 01, 2017  1:00 PM CDT   [facility] with [facility]   [facility] ([facility] )    [address] [address]   [phone]            May 04, 2017  1:00 PM CDT   [facility] Visit with [facility]   [facility] ([facility] )    [address] [address]   [phone]            May 08, 2017  2:00 PM CDT   [facility]   [facility] ([facility] )    [address] [address]   [phone]            May 11, 2017  1:00 PM CDT   (Arrive by 12:45 PM)   Return Visit with Loly Wells NP   [facility]    [address] [facility] [id number] [phone]              Who to contact     If you have questions or need follow up information about today's clinic visit or your schedule please contact [facility] directly at [phone].  Normal or non-critical lab and imaging  Epidural Block    Patient location during procedure: OB  Start time: 10/23/2024 4:36 PM  End time: 10/23/2024 4:54 PM  Reason for block: labor analgesia  Staffing  Performed: CRNA   Authorized by: ELISA Zavala    Performed by: ELISA Zavala    Preanesthetic Checklist  Completed: patient identified, IV checked, risks and benefits discussed, surgical consent, pre-op evaluation, timeout performed and sterile techniques followed  Block Timeout  RN/Licensed healthcare professional reads aloud to the Anesthesia provider and entire team: Patient identity, procedure with side and site, patient position, and as applicable the availability of implants/special equipment/special requirements.  Patient on coagulant treatment: no  Timeout performed at: 10/23/2024 4:38 PM  Block Placement  Patient position: sitting  Prep: ChloraPrep  Sterility prep: mask, hand, gloves, drape and cap  Sedation level: no sedation  Patient monitoring: heart rate, continuous pulse oximetry and blood pressure  Approach: midline  Local numbing: lidocaine 1% to skin and subcutaneous tissues  Vertebral space: lumbar  Lumbar location: L3-L4  Epidural  Loss of resistance technique: air  Guidance: landmark technique        Needle  Needle type: Tuohy   Needle gauge: 17  Needle length: 10.2 cm  Needle insertion depth: 5 cm  Catheter type: multi-orifice  Catheter at skin depth: 12 cm  Catheter securement method: clear occlusive dressing    Test dose: lidocaine 1.5% with epinephrine 1-to-200,000  Test dose: lidocaine 1.5% with epinephrine 1-to-200,000  Test dose result: no positive test dose    PCEA  Medication concentration used: 0.2% Ropivacaine with 2 mcg/mL Fentanyl  Dose (mL): 5  Lockout (minutes): 20  1-Hour Limit (boluses/hr): 25  Basal Rate: 10        Assessment  Block outcome: pain improved  Number of attempts: 1  Events: no positive test dose  Procedure assessment: patient tolerated procedure well with no immediate  "results will be communicated to you by MyChart, letter or phone within 4 business days after the clinic has received the results. If you do not hear from us within 7 days, please contact the clinic through [employer] or phone. If you have a critical or abnormal lab result, we will notify you by phone as soon as possible.  Submit refill requests through [employer] or call your pharmacy and they will forward the refill request to us. Please allow 3 business days for your refill to be completed.          Additional Information About Your Visit        AcceptdharKeenjar Information     [employer] lets you send messages to your doctor, view your test results, renew your prescriptions, schedule appointments and more. To sign up, go to [email or web address] . Click on \"Log in\" on the left side of the screen, which will take you to the Welcome page. Then click on \"Sign up Now\" on the right side of the page.     You will be asked to enter the access code listed below, as well as some personal information. Please follow the directions to create your username and password.     Your access code is: [email or web address]  Expires: 2017  3:18 PM     Your access code will  in 90 days. If you need help or a new code, please call your [place] clinic or [phone].        Care EveryWhere ID     This is your Care EveryWhere ID. This could be used by other organizations to access your [place] medical records  [id number]        Your Vitals Were     Pulse Temperature Height BMI (Body Mass Index)          72 98.5  F (36.9  C) 5' 10.5\" (1.791 m) 30.13 kg/m2         Blood Pressure from Last 3 Encounters:   17 148/84   17 162/96   17 136/84    Weight from Last 3 Encounters:   17 213 lb (96.6 kg)   17 214 lb 1.6 oz (97.1 kg)   17 214 lb (97.1 kg)              Today, you had the following     No orders found for display       Primary Care Provider Office Phone # Fax #    Jeremy Glasgow [phone]. " complications                 [facility] [address] [facility] [address]        Thank you!     Thank you for choosing [facility]  for your care. Our goal is always to provide you with excellent care. Hearing back from our patients is one way we can continue to improve our services. Please take a few minutes to complete the written survey that you may receive in the mail after your visit with us. Thank you!             Your Updated Medication List - Protect others around you: Learn how to safely use, store and throw away your medicines at www.disposemymeds.org.          This list is accurate as of: 4/27/17  2:03 PM.  Always use your most recent med list.                   Brand Name Dispense Instructions for use    diazepam 5 MG tablet    VALIUM    9 tablet    Take 1 tablet (5 mg) by mouth every 8 hours as needed for muscle spasms or pain (MUSCLE SPASM)       GLIPIZIDE PO      Take 7.5 mg by mouth 2 times daily (before meals)       HYDROcodone-acetaminophen 5-325 MG per tablet    NORCO     Take 1 tablet by mouth every 6 hours as needed for moderate to severe pain (for arthritis)       ketorolac 10 MG tablet    TORADOL    20 tablet    Take 1 tablet (10 mg) by mouth every 6 hours as needed for moderate pain       LISINOPRIL PO      Take 5 mg by mouth daily       metFORMIN 500 MG 24 hr tablet    GLUCOPHAGE-XR     Take 2,000 mg by mouth daily       oxyCODONE 5 MG IR tablet    ROXICODONE    30 tablet    Take 1-2 tablets (5-10 mg) by mouth every 6 hours as needed for moderate to severe pain (1 hour before dressing change)

## 2024-12-13 PROBLEM — E78.2 MIXED HYPERLIPIDEMIA: Status: ACTIVE | Noted: 2024-12-13

## 2025-02-09 DIAGNOSIS — E11.319 TYPE 2 DIABETES MELLITUS WITH RETINOPATHY OF LEFT EYE, WITHOUT LONG-TERM CURRENT USE OF INSULIN, MACULAR EDEMA PRESENCE UNSPECIFIED, UNSPECIFIED RETINOPATHY SEVERITY (H): ICD-10-CM

## 2025-02-11 PROBLEM — R04.0 LEFT-SIDED NOSEBLEED: Status: ACTIVE | Noted: 2025-02-11

## 2025-02-13 RX ORDER — GLIPIZIDE 10 MG/1
10 TABLET ORAL
Qty: 180 TABLET | Refills: 0 | Status: SHIPPED | OUTPATIENT
Start: 2025-02-13

## 2025-02-13 NOTE — TELEPHONE ENCOUNTER
[facility] sent Rx request for the following:      Requested Prescriptions   Pending Prescriptions Disp Refills    glipiZIDE (GLUCOTROL) 10 MG tablet [Pharmacy Med Name: glipiZIDE 10 MG Oral Tablet] 180 tablet 0     Sig: TAKE 1 TABLET BY MOUTH TWICE DAILY BEFORE MEAL(S)     Last Prescription Date:   01/11/2024  Last Fill Qty/Refills:         180, R-4    Last Office Visit:              12/13/2024   Future Office visit:           02/21/202  Prescription approved per [facility] Refill Protocol.   Yolis De Jesus RN on 2/13/2025 at 9:03 AM

## 2025-02-21 ENCOUNTER — LAB (OUTPATIENT)
Dept: LAB | Facility: OTHER | Age: 80
End: 2025-02-21
Attending: INTERNAL MEDICINE
Payer: MEDICARE

## 2025-02-21 ENCOUNTER — OFFICE VISIT (OUTPATIENT)
Dept: INTERNAL MEDICINE | Facility: OTHER | Age: 80
End: 2025-02-21
Attending: INTERNAL MEDICINE
Payer: MEDICARE

## 2025-02-21 VITALS
DIASTOLIC BLOOD PRESSURE: 82 MMHG | SYSTOLIC BLOOD PRESSURE: 138 MMHG | HEIGHT: 69 IN | HEART RATE: 83 BPM | BODY MASS INDEX: 29.15 KG/M2 | WEIGHT: 196.8 LBS | OXYGEN SATURATION: 99 % | RESPIRATION RATE: 16 BRPM | TEMPERATURE: 98.1 F

## 2025-02-21 DIAGNOSIS — E11.319 TYPE 2 DIABETES MELLITUS WITH RETINOPATHY OF LEFT EYE, WITHOUT LONG-TERM CURRENT USE OF INSULIN, MACULAR EDEMA PRESENCE UNSPECIFIED, UNSPECIFIED RETINOPATHY SEVERITY (H): ICD-10-CM

## 2025-02-21 DIAGNOSIS — E78.2 MIXED HYPERLIPIDEMIA: ICD-10-CM

## 2025-02-21 DIAGNOSIS — I10 BENIGN ESSENTIAL HYPERTENSION: ICD-10-CM

## 2025-02-21 DIAGNOSIS — N18.2 CKD (CHRONIC KIDNEY DISEASE) STAGE 2, GFR 60-89 ML/MIN: ICD-10-CM

## 2025-02-21 DIAGNOSIS — Z00.00 MEDICARE ANNUAL WELLNESS VISIT, SUBSEQUENT: Primary | ICD-10-CM

## 2025-02-21 DIAGNOSIS — Z71.85 VACCINE COUNSELING: ICD-10-CM

## 2025-02-21 LAB
ALBUMIN SERPL BCG-MCNC: 4.6 G/DL (ref 3.5–5.2)
ALBUMIN UR-MCNC: NEGATIVE MG/DL
ALP SERPL-CCNC: 104 U/L (ref 40–150)
ALT SERPL W P-5'-P-CCNC: 22 U/L (ref 0–70)
ANION GAP SERPL CALCULATED.3IONS-SCNC: 9 MMOL/L (ref 7–15)
APPEARANCE UR: CLEAR
AST SERPL W P-5'-P-CCNC: 21 U/L (ref 0–45)
BILIRUB SERPL-MCNC: 1.3 MG/DL
BILIRUB UR QL STRIP: NEGATIVE
BUN SERPL-MCNC: 10.3 MG/DL (ref 8–23)
CALCIUM SERPL-MCNC: 9.9 MG/DL (ref 8.8–10.4)
CHLORIDE SERPL-SCNC: 103 MMOL/L (ref 98–107)
CHOLEST SERPL-MCNC: 97 MG/DL
COLOR UR AUTO: YELLOW
CREAT SERPL-MCNC: 0.85 MG/DL (ref 0.67–1.17)
CREAT UR-MCNC: 119.4 MG/DL
EGFRCR SERPLBLD CKD-EPI 2021: 88 ML/MIN/1.73M2
ERYTHROCYTE [DISTWIDTH] IN BLOOD BY AUTOMATED COUNT: 13.2 % (ref 10–15)
EST. AVERAGE GLUCOSE BLD GHB EST-MCNC: 146 MG/DL
FASTING STATUS PATIENT QL REPORTED: ABNORMAL
FASTING STATUS PATIENT QL REPORTED: ABNORMAL
GLUCOSE SERPL-MCNC: 134 MG/DL (ref 70–99)
GLUCOSE UR STRIP-MCNC: NEGATIVE MG/DL
HBA1C MFR BLD: 6.7 %
HCO3 SERPL-SCNC: 26 MMOL/L (ref 22–29)
HCT VFR BLD AUTO: 45.9 % (ref 40–53)
HDLC SERPL-MCNC: 38 MG/DL
HGB BLD-MCNC: 15.8 G/DL (ref 13.3–17.7)
HGB UR QL STRIP: NEGATIVE
KETONES UR STRIP-MCNC: NEGATIVE MG/DL
LDLC SERPL CALC-MCNC: 41 MG/DL
LEUKOCYTE ESTERASE UR QL STRIP: NEGATIVE
MCH RBC QN AUTO: 31 PG (ref 26.5–33)
MCHC RBC AUTO-ENTMCNC: 34.4 G/DL (ref 31.5–36.5)
MCV RBC AUTO: 90 FL (ref 78–100)
MICROALBUMIN UR-MCNC: <12 MG/L
MICROALBUMIN/CREAT UR: NORMAL MG/G{CREAT}
NITRATE UR QL: NEGATIVE
NONHDLC SERPL-MCNC: 59 MG/DL
PH UR STRIP: 5.5 [PH] (ref 5–9)
PLATELET # BLD AUTO: 212 10E3/UL (ref 150–450)
POTASSIUM SERPL-SCNC: 4.5 MMOL/L (ref 3.4–5.3)
PROT SERPL-MCNC: 7.9 G/DL (ref 6.4–8.3)
RBC # BLD AUTO: 5.09 10E6/UL (ref 4.4–5.9)
SODIUM SERPL-SCNC: 138 MMOL/L (ref 135–145)
SP GR UR STRIP: 1.01 (ref 1–1.03)
TRIGL SERPL-MCNC: 92 MG/DL
TSH SERPL DL<=0.005 MIU/L-ACNC: 1.15 UIU/ML (ref 0.3–4.2)
UROBILINOGEN UR STRIP-MCNC: NORMAL MG/DL
WBC # BLD AUTO: 8.4 10E3/UL (ref 4–11)

## 2025-02-21 PROCEDURE — 81003 URINALYSIS AUTO W/O SCOPE: CPT | Mod: ZL

## 2025-02-21 PROCEDURE — G0463 HOSPITAL OUTPT CLINIC VISIT: HCPCS | Performed by: INTERNAL MEDICINE

## 2025-02-21 PROCEDURE — 36415 COLL VENOUS BLD VENIPUNCTURE: CPT | Mod: ZL

## 2025-02-21 PROCEDURE — 84443 ASSAY THYROID STIM HORMONE: CPT | Mod: ZL

## 2025-02-21 PROCEDURE — 82043 UR ALBUMIN QUANTITATIVE: CPT | Mod: ZL

## 2025-02-21 PROCEDURE — 83036 HEMOGLOBIN GLYCOSYLATED A1C: CPT | Mod: ZL

## 2025-02-21 PROCEDURE — 85014 HEMATOCRIT: CPT | Mod: ZL

## 2025-02-21 PROCEDURE — 80061 LIPID PANEL: CPT | Mod: ZL

## 2025-02-21 PROCEDURE — 80053 COMPREHEN METABOLIC PANEL: CPT | Mod: ZL

## 2025-02-21 RX ORDER — LISINOPRIL 40 MG/1
40 TABLET ORAL DAILY
Qty: 90 TABLET | Refills: 4 | Status: SHIPPED | OUTPATIENT
Start: 2025-02-21

## 2025-02-21 RX ORDER — GLIPIZIDE 10 MG/1
10 TABLET ORAL
Qty: 180 TABLET | Refills: 1 | Status: SHIPPED | OUTPATIENT
Start: 2025-02-21

## 2025-02-21 RX ORDER — GLIPIZIDE 10 MG/1
10 TABLET ORAL
Qty: 180 TABLET | Refills: 0 | Status: CANCELLED | OUTPATIENT
Start: 2025-02-21

## 2025-02-21 RX ORDER — GLIPIZIDE 10 MG/1
10 TABLET ORAL
Qty: 180 TABLET | Refills: 4 | Status: CANCELLED | OUTPATIENT
Start: 2025-02-21

## 2025-02-21 RX ORDER — ATORVASTATIN CALCIUM 40 MG/1
40 TABLET, FILM COATED ORAL DAILY
Qty: 90 TABLET | Refills: 4 | Status: SHIPPED | OUTPATIENT
Start: 2025-02-21

## 2025-02-21 RX ORDER — METFORMIN HYDROCHLORIDE 500 MG/1
500 TABLET, EXTENDED RELEASE ORAL
Qty: 180 TABLET | Refills: 1 | Status: SHIPPED | OUTPATIENT
Start: 2025-02-21

## 2025-02-21 SDOH — HEALTH STABILITY: PHYSICAL HEALTH: ON AVERAGE, HOW MANY MINUTES DO YOU ENGAGE IN EXERCISE AT THIS LEVEL?: 30 MIN

## 2025-02-21 SDOH — HEALTH STABILITY: PHYSICAL HEALTH: ON AVERAGE, HOW MANY DAYS PER WEEK DO YOU ENGAGE IN MODERATE TO STRENUOUS EXERCISE (LIKE A BRISK WALK)?: 4 DAYS

## 2025-02-21 ASSESSMENT — ENCOUNTER SYMPTOMS
ABDOMINAL PAIN: 0
WHEEZING: 0
FEVER: 0
NAUSEA: 0
BRUISES/BLEEDS EASILY: 0
CONFUSION: 0
SHORTNESS OF BREATH: 0
ARTHRALGIAS: 0
AGITATION: 0
DYSURIA: 0
CHILLS: 0
COUGH: 0
LIGHT-HEADEDNESS: 0
VOMITING: 0
HEMATURIA: 0
MYALGIAS: 0
WOUND: 0
DIARRHEA: 0
DIZZINESS: 0

## 2025-02-21 ASSESSMENT — PAIN SCALES - GENERAL: PAINLEVEL_OUTOF10: NO PAIN (0)

## 2025-02-21 ASSESSMENT — SOCIAL DETERMINANTS OF HEALTH (SDOH): HOW OFTEN DO YOU GET TOGETHER WITH FRIENDS OR RELATIVES?: TWICE A WEEK

## 2025-02-21 NOTE — PROGRESS NOTES
"Preventive Care Visit  [facility] AND [facility]  Heron Heredia MD, Internal Medicine  Feb 21, 2025      Encounter for Medicare annual wellness exam    -Colon cancer screening done via colonoscopy on 5/8/19 (impression: adenomas). Follow-up no longer indicated.     -PSA lab work performed 4/1/19 (value of 2.399 ng/mL).    -Immunizations: Patient is due for the following: NA. Looks like he is due for flu shot but he states he's had it this season.    -Derm: Does patient regularly see dermatologist? No.     -Refills pended for requested medications.    -Labs in process.    -Due: eye exam- will go to [facility].    BMI  Estimated body mass index is 29.27 kg/m  as calculated from the following:    Height as of this encounter: 1.746 m (5' 8.75\").    Weight as of this encounter: 89.3 kg (196 lb 12.8 oz).     Counseling  Appropriate preventive services were addressed with this patient via screening, questionnaire, or discussion as appropriate for fall prevention, nutrition, physical activity, Tobacco-use cessation, social engagement, weight loss and cognition.  Checklist reviewing preventive services available has been given to the patient.  Reviewed patient's diet, addressing concerns and/or questions.   The patient was instructed to see the dentist every 6 months.     Work on weight loss  Regular exercise    Return in about 3 months (around 5/21/2025) for - Labs every 91+ days, with DM Follow-up, Same Day or 1-2 days later with Dr. Heredia.    Subjective   Irving is a 79 year old, presenting for the following:  Medicare Visit        2/21/2025     1:31 PM   Additional Questions   Roomed by BOUBACAR Batista         Health Care Directive  Patient does not have a Health Care Directive: Discussed advance care planning with patient; however, patient declined at this time.        2/21/2025   General Health   How would you rate your overall physical health? Excellent   Feel stress (tense, anxious, or unable to sleep) Not at all        "  2/21/2025   Nutrition   Diet: I don't know         2/21/2025   Exercise   Days per week of moderate/strenous exercise 4 days   Average minutes spent exercising at this level 30 min         2/21/2025   Social Factors   Frequency of gathering with friends or relatives Twice a week   Worry food won't last until get money to buy more No   Food not last or not have enough money for food? No   Do you have housing? (Housing is defined as stable permanent housing and does not include staying ouside in a car, in a tent, in an abandoned building, in an overnight shelter, or couch-surfing.) Yes   Are you worried about losing your housing? No   Lack of transportation? No   Unable to get utilities (heat,electricity)? No         2/21/2025   Fall Risk   Fallen 2 or more times in the past year? No    Trouble with walking or balance? No        Proxy-reported          2/21/2025   Activities of Daily Living- Home Safety   Needs help with the following daily activites None of the above   Safety concerns in the home None of the above         2/21/2025   Dental   Dentist two times every year? (!) NO         2/21/2025   Hearing Screening   Hearing concerns? None of the above         2/21/2025   Driving Risk Screening   Patient/family members have concerns about driving No         2/21/2025   General Alertness/Fatigue Screening   Have you been more tired than usual lately? No         2/21/2025   Urinary Incontinence Screening   Bothered by leaking urine in past 6 months No     Today's PHQ-2 Score:       2/21/2025     1:24 PM   PHQ-2 ( 1999 [employer])   Q1: Little interest or pleasure in doing things 0   Q2: Feeling down, depressed or hopeless 0   PHQ-2 Score 0    Q1: Little interest or pleasure in doing things Not at all   Q2: Feeling down, depressed or hopeless Not at all   PHQ-2 Score 0       Patient-reported         2/21/2025   Substance Use   Alcohol more than 3/day or more than 7/wk No   Do you have a current opioid prescription? No  "  How severe/bad is pain from 1 to 10? 1/10   Do you use any other substances recreationally? No     Social History     Tobacco Use    Smoking status: Former     Current packs/day: 0.00     Types: Cigarettes     Quit date: 3/27/2002     Years since quittin.9    Smokeless tobacco: Never   Vaping Use    Vaping status: Never Used   Substance Use Topics    Alcohol use: Yes     Alcohol/week: 2.0 standard drinks of alcohol     Types: 2 Cans of beer per week     Comment: occasionally    Drug use: No              Reviewed and updated as needed this visit by Provider   Tobacco  Allergies  Meds  Problems  Med Hx  Surg Hx  Fam Hx     Sexual Activity        Current providers sharing in care for this patient include:  Patient Care Team:  Heron Heredia MD as PCP - General (Internal Medicine)  Rober Panda MD as Assigned PCP    The following health maintenance items are reviewed in Epic and correct as of today:  Health Maintenance   Topic Date Due    EYE EXAM  2022    MICROALBUMIN  10/05/2023    ZOSTER IMMUNIZATION (3 of 3) 2025    A1C  2025    DIABETIC FOOT EXAM  2025    MEDICARE ANNUAL WELLNESS VISIT  2026    BMP  2026    LIPID  2026    FALL RISK ASSESSMENT  2026    ADVANCE CARE PLANNING  2030    DTAP/TDAP/TD IMMUNIZATION (4 - Td or Tdap) 2034    PHQ-2 (once per calendar year)  Completed    Pneumococcal Vaccine: 50+ Years  Completed    RSV VACCINE  Completed    HEPATITIS C SCREENING  Addressed    HPV IMMUNIZATION  Aged Out    MENINGITIS IMMUNIZATION  Aged Out    INFLUENZA VACCINE  Discontinued    COLORECTAL CANCER SCREENING  Discontinued    COVID-19 Vaccine  Discontinued      Objective      Exam  /82   Pulse 83   Temp 98.1  F (36.7  C)   Resp 16   Ht 1.746 m (5' 8.75\")   Wt 89.3 kg (196 lb 12.8 oz)   SpO2 99%   BMI 29.27 kg/m           2025   Mini Cog   Mini-Cog Not Completed (choose reason) Patient declines          Signed " Electronically by: Heron Heredia MD

## 2025-02-21 NOTE — PATIENT INSTRUCTIONS
Blood pressure is well controlled.   Diabetes is well controlled.     Medications refilled.     Labs are stable.     Get your Last Shingrix Vaccination anytime.       Aspects of Diabetes:   Recent Labs   Lab Test 02/21/25  1304 10/07/24  1350 01/11/24  1424   A1C 6.7* 6.6* 6.9*   LDL 41 57 63   HDL 38* 43 42   TRIG 92 90 78   ALT 22 25 26   CR 0.85 0.86 0.75   GFRESTIMATED 88 89 >90   POTASSIUM 4.5 4.6 4.0   TSH  --  1.12  --    WBC 8.4 8.0 7.8   HGB 15.8 16.6 15.4    231 216   ALBUMIN 4.6 4.7 4.6      Hemoglobin A1c  Goal range is under 8%. Best is 6.5 to 7   Blood Pressure 138/82 Goal to keep less than 140/90   Tobacco  reports that he quit smoking about 22 years ago. His smoking use included cigarettes. He has never used smokeless tobacco. Goal to abstain from tobacco   Aspirin or Plavix Anti-platelet therapy Aspirin or Plavix reduces risk of heart disease and stroke  -- sometimes used with other blood thinners, depending on bleeding risk and risk factors.    ACE/ARB Specific blood pressure meds These medications can reduce risk of kidney disease   Cholesterol Statins (Lipitor, Crestor, vs others) Statins reduce risk of heart disease and stroke   Eye Exam -- Do Yearly -- Annual diabetic eye exam   Healthy weight Wt Readings from Last 4 Encounters:   02/21/25 89.3 kg (196 lb 12.8 oz)   12/13/24 89.9 kg (198 lb 3.2 oz)   01/18/24 93.4 kg (206 lb)   01/11/24 93.4 kg (205 lb 12.8 oz)      Body mass index is 29.27 kg/m .  Goal BMI under 30, best is under 25.      -- Trying to exercise daily (goal at least 20 min/day) with moderate aerobic activity   -- Eat healthy (resources from ADA at http://www.diabetes.org/)   -- Taking good care of my feet. Consider seeing the Podiatrist   -- Check blood sugars as directed, record in log book and bring to every appointment    Insurance companies are grading you and I on your blood sugar control -- Goal is to get your A1c down to 7.9% or lower and NO Smoking!  -- Medicare  and most insurance companies, will only cover Hemoglobin A1c labs to be rechecked every 91+ days.      Return for Diabetes labs and clinic follow-up appointment every 3 to 4 months.    Schedule lab only appointment --- A few days AFTER: 5/22/25   Schedule clinic appointment with Dr. Heredia -- Same day as labs, or 1-2 days later.            Patient Education   Preventive Care Advice   This is general advice given by our system to help you stay healthy. However, your care team may have specific advice just for you. Please talk to your care team about your preventive care needs.  Nutrition  Eat 5 or more servings of fruits and vegetables each day.  Try wheat bread, brown rice and whole grain pasta (instead of white bread, rice, and pasta).  Get enough calcium and vitamin D. Check the label on foods and aim for 100% of the RDA (recommended daily allowance).  Lifestyle  Exercise at least 150 minutes each week  (30 minutes a day, 5 days a week).  Do muscle strengthening activities 2 days a week. These help control your weight and prevent disease.  No smoking.  Wear sunscreen to prevent skin cancer.  Have a dental exam and cleaning every 6 months.  Yearly exams  See your health care team every year to talk about:  Any changes in your health.  Any medicines your care team has prescribed.  Preventive care, family planning, and ways to prevent chronic diseases.  Shots (vaccines)   HPV shots (up to age 26), if you've never had them before.  Hepatitis B shots (up to age 59), if you've never had them before.  COVID-19 shot: Get this shot when it's due.  Flu shot: Get a flu shot every year.  Tetanus shot: Get a tetanus shot every 10 years.  Pneumococcal, hepatitis A, and RSV shots: Ask your care team if you need these based on your risk.  Shingles shot (for age 50 and up)  General health tests  Diabetes screening:  Starting at age 35, Get screened for diabetes at least every 3 years.  If you are younger than age 35, ask your  care team if you should be screened for diabetes.  Cholesterol test: At age 39, start having a cholesterol test every 5 years, or more often if advised.  Bone density scan (DEXA): At age 50, ask your care team if you should have this scan for osteoporosis (brittle bones).  Hepatitis C: Get tested at least once in your life.  STIs (sexually transmitted infections)  Before age 24: Ask your care team if you should be screened for STIs.  After age 24: Get screened for STIs if you're at risk. You are at risk for STIs (including HIV) if:  You are sexually active with more than one person.  You don't use condoms every time.  You or a partner was diagnosed with a sexually transmitted infection.  If you are at risk for HIV, ask about PrEP medicine to prevent HIV.  Get tested for HIV at least once in your life, whether you are at risk for HIV or not.  Cancer screening tests  Cervical cancer screening: If you have a cervix, begin getting regular cervical cancer screening tests starting at age 21.  Breast cancer scan (mammogram): If you've ever had breasts, begin having regular mammograms starting at age 40. This is a scan to check for breast cancer.  Colon cancer screening: It is important to start screening for colon cancer at age 45.  Have a colonoscopy test every 10 years (or more often if you're at risk) Or, ask your provider about stool tests like a FIT test every year or Cologuard test every 3 years.  To learn more about your testing options, visit:   .  For help making a decision, visit:   https://bit.ly/no94057.  Prostate cancer screening test: If you have a prostate, ask your care team if a prostate cancer screening test (PSA) at age 55 is right for you.  Lung cancer screening: If you are a current or former smoker ages 50 to 80, ask your care team if ongoing lung cancer screenings are right for you.  For informational purposes only. Not to replace the advice of your health care provider. Copyright   2023 [facility]. All rights reserved. Clinically reviewed by the [facility] Transitions Program. [employer] [id number] - REV 01/24.

## 2025-02-21 NOTE — PROGRESS NOTES
Assessment & Plan     ICD-10-CM    1. Medicare annual wellness visit, subsequent  Z00.00       2. Vaccine counseling  Z71.85       3. 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 Mixed hyperlipidemia  E78.2 atorvastatin (LIPITOR) 40 MG tablet      5. Benign essential hypertension  I10 lisinopril (ZESTRIL) 40 MG tablet      6. CKD (chronic kidney disease) stage 2, GFR 60-89 ml/min  N18.2       7. Personal history of  service - 1811-4125 - [employer], [employer] - [place], [facility]  Z91.85          Patient presents for Medicare annual wellness visit as well as follow-up multiple issues.    Patient has diabetic retinopathy.  Eye care referral placed.  Continues with lisinopril, metformin, glipizide.  Seems to be tolerating well.  Needs refills.  Standing orders are currently up-to-date.  Labs collected.    Blood pressure is currently well-controlled.  See below.    History of  service, chart updated.    HYPERTENSION - Ongoing. Blood pressure is currently well controlled.  Medication side effects: None. Denies syncope or presyncope.  Continue current medications.   Medication list reviewed/updated. Refills completed as needed.      MIXED HYPERLIPIDEMIA.  Ongoing. LDL is at goal: Yes. Triglycerides are at goal: Yes.    Medication side effects reported: None.   Continue current medications for now. Medication list reviewed/updated. Refills completed as needed.  Recent Labs   Lab Test 02/21/25  1304 10/07/24  1350   CHOL 97 118   HDL 38* 43   LDL 41 57   TRIG 92 90        Chronic Kidney Disease, Stage 2 (GFR 60-89), chronic, ongoing.  Kidney function had been slowly declining.  Encourage NSAID avoidance.     Recent Labs   Lab Test 02/21/25  1304 10/07/24  1350   CR 0.85 0.86   GFRESTIMATED 88 89        Vaccine counseling completed.  Encourage routine / annual vaccinations.    Type 2 Diabetes Mellitus, with nephropathy, with retinopathy.  Blood sugar control has been good with minimal hyperglycemia. Doing well with diet, oral agents, and exercise - xNO Insulin injections per day.  Medication list reviewed/updated. Refills completed as needed.    Complicating factors include but are not limited to: hypertension, hyperlipidemia, retinopathy, and chronic kidney disease.     Recent Labs   Lab Test 02/21/25  1304 10/07/24  1350 01/11/24  1424   A1C 6.7* 6.6* 6.9*   LDL 41 57 63   HDL 38* 43 42   TRIG 92 90 78   ALT 22 25 26   CR 0.85 0.86 0.75   GFRESTIMATED 88 89 >90   POTASSIUM 4.5 4.6 4.0   TSH 1.15 1.12  --    WBC 8.4 8.0 7.8   HGB 15.8 16.6 15.4    231 216   ALBUMIN 4.6 4.7 4.6     Results for orders placed or performed in visit on 02/21/25   Comprehensive metabolic panel     Status: Abnormal   Result Value Ref Range    Sodium 138 135 - 145 mmol/L    Potassium 4.5 3.4 - 5.3 mmol/L    Carbon Dioxide (CO2) 26 22 - 29 mmol/L    Anion Gap 9 7 - 15 mmol/L    Urea Nitrogen 10.3 8.0 - 23.0 mg/dL    Creatinine 0.85 0.67 - 1.17 mg/dL    GFR Estimate 88 >60 mL/min/1.73m2    Calcium 9.9 8.8 - 10.4 mg/dL    Chloride 103 98 - 107 mmol/L    Glucose 134 (H) 70 - 99 mg/dL    Alkaline Phosphatase 104 40 - 150 U/L    AST 21 0 - 45 U/L    ALT 22 0 - 70 U/L    Protein Total 7.9 6.4 - 8.3 g/dL    Albumin 4.6 3.5 - 5.2 g/dL    Bilirubin Total 1.3 (H) <=1.2 mg/dL    Patient Fasting > 8hrs? Unknown    Lipid Profile     Status: Abnormal   Result Value Ref Range    Cholesterol 97 <200 mg/dL    Triglycerides 92 <150 mg/dL    Direct Measure HDL 38 (L) >=40 mg/dL    LDL Cholesterol Calculated 41 <100 mg/dL    Non HDL Cholesterol 59 <130 mg/dL    Patient Fasting > 8hrs? Unknown     Narrative    Cholesterol  Desirable: < 200 mg/dL  Borderline  High: 200 - 239 mg/dL  High: >= 240 mg/dL    Triglycerides  Normal: < 150 mg/dL  Borderline High: 150 - 199 mg/dL  High: 200-499 mg/dL  Very High: >= 500 mg/dL    Direct Measure HDL  Female: >= 50 mg/dL   Male: >= 40 mg/dL    LDL Cholesterol  Desirable: < 100 mg/dL  Above Desirable: 100 - 129 mg/dL   Borderline High: 130 - 159 mg/dL   High:  160 - 189 mg/dL   Very High: >= 190 mg/dL    Non HDL Cholesterol  Desirable: < 130 mg/dL  Above Desirable: 130 - 159 mg/dL  Borderline High: 160 - 189 mg/dL  High: 190 - 219 mg/dL  Very High: >= 220 mg/dL   Albumin Random Urine Quantitative with Creat Ratio     Status: None   Result Value Ref Range    Creatinine Urine mg/dL 119.4 mg/dL    Albumin Urine mg/L <12.0 mg/L    Albumin Urine mg/g Cr     CBC with platelets     Status: Normal   Result Value Ref Range    WBC Count 8.4 4.0 - 11.0 10e3/uL    RBC Count 5.09 4.40 - 5.90 10e6/uL    Hemoglobin 15.8 13.3 - 17.7 g/dL    Hematocrit 45.9 40.0 - 53.0 %    MCV 90 78 - 100 fL    MCH 31.0 26.5 - 33.0 pg    MCHC 34.4 31.5 - 36.5 g/dL    RDW 13.2 10.0 - 15.0 %    Platelet Count 212 150 - 450 10e3/uL   Hemoglobin A1c     Status: Abnormal   Result Value Ref Range    Estimated Average Glucose 146 (H) <117 mg/dL    Hemoglobin A1C 6.7 (H) <5.7 %   TSH with free T4 reflex     Status: Normal   Result Value Ref Range    TSH 1.15 0.30 - 4.20 uIU/mL   Urine Macroscopic with reflex to Microscopic     Status: Normal   Result Value Ref Range    Color Urine Yellow Colorless, Straw, Light Yellow, Yellow    Appearance Urine Clear Clear    Glucose Urine Negative Negative mg/dL    Bilirubin Urine Negative Negative    Ketones Urine Negative Negative mg/dL    Specific Gravity Urine 1.015 1.005 - 1.030    Blood Urine Negative Negative    pH Urine 5.5 5.0 - 9.0    Protein Albumin Urine Negative Negative mg/dL    Urobilinogen Urine Normal 0.2, 1.0, Normal mg/dL    Nitrite Urine Negative Negative    Leukocyte Esterase Urine Negative Negative    Narrative     Microscopic not indicated      Urinalysis is normal.  TSH normal.  Hemoglobin A1c is well-controlled.  CBC normal.  Urine protein levels are normal.  HDL is low.  LDL is at goal.  Total bilirubin slightly elevated.  Random glucose elevated.  Creatinine 0.85 with GFR of 88.  AST and ALT are normal.  Potassium normal.    The longitudinal plan of care for the diagnosis(es)/condition(s) as documented were addressed during this visit. Due to the added complexity in care, I will continue to support Irving in the subsequent management and with ongoing continuity of care.             Counseling  Appropriate preventive services were addressed with this patient via screening, questionnaire, or discussion as appropriate for fall prevention, nutrition, physical activity, Tobacco-use cessation, social engagement, weight loss and cognition.  Checklist reviewing preventive services available has been given to the patient.  Reviewed patient's diet, addressing concerns and/or questions.   The patient was instructed to see the dentist every 6 months.         Return in about 3 months (around 5/21/2025) for - Labs every 91+ days, with DM Follow-up, Same Day or 1-2 days later with Dr. Heredia.      Heron Herdeia MD  [facility]    Review of Systems   Constitutional:  Negative for chills and fever.   HENT:  Positive for nosebleeds (Intermittent, worse with dry air, winter). Negative for congestion and hearing loss.    Eyes:  Negative for visual disturbance.   Respiratory:  Negative for cough, shortness of breath and wheezing.    Cardiovascular:  Negative for chest pain.   Gastrointestinal:  Negative for abdominal pain, diarrhea (Rare Diarrhea with XL Metformin), nausea and vomiting.   Endocrine: Negative for cold intolerance and heat intolerance.   Genitourinary:  Negative for dysuria and hematuria.   Musculoskeletal:  Negative for arthralgias and myalgias.        Hx rotator cuff repair bilaterally    Skin:  Negative  "for rash and wound.   Allergic/Immunologic: Negative for immunocompromised state.   Neurological:  Negative for dizziness and light-headedness.   Hematological:  Does not bruise/bleed easily.   Psychiatric/Behavioral:  Negative for agitation and confusion.        Subjective   Irving is a 79 year old, presenting for the following health issues:  Medicare Visit        2/21/2025     1:31 PM   Additional Questions   Roomed by BOUBACAR Batista     HPI                   Objective    /82   Pulse 83   Temp 98.1  F (36.7  C)   Resp 16   Ht 1.746 m (5' 8.75\")   Wt 89.3 kg (196 lb 12.8 oz)   SpO2 99%   BMI 29.27 kg/m    Body mass index is 29.27 kg/m .  Physical Exam  Constitutional:       General: He is not in acute distress.     Appearance: Normal appearance. He is well-developed. He is not ill-appearing.   Eyes:      General: No scleral icterus.     Conjunctiva/sclera: Conjunctivae normal.   Neck:      Vascular: No carotid bruit.   Cardiovascular:      Rate and Rhythm: Normal rate and regular rhythm.      Pulses: Normal pulses.   Pulmonary:      Effort: Pulmonary effort is normal. No respiratory distress.      Breath sounds: No wheezing.   Abdominal:      Palpations: Abdomen is soft.      Tenderness: There is no abdominal tenderness.   Musculoskeletal:         General: No tenderness or deformity.      Right lower leg: No edema.      Left lower leg: No edema.   Skin:     General: Skin is warm and dry.      Findings: No rash.   Neurological:      Mental Status: He is alert. Mental status is at baseline.      Cranial Nerves: No cranial nerve deficit.   Psychiatric:         Mood and Affect: Mood normal.         Behavior: Behavior normal.                    Signed Electronically by: Heron Heredia MD    "

## 2025-06-03 ENCOUNTER — RESULTS FOLLOW-UP (OUTPATIENT)
Dept: INTERNAL MEDICINE | Facility: OTHER | Age: 80
End: 2025-06-03

## 2025-06-03 ENCOUNTER — LAB (OUTPATIENT)
Dept: LAB | Facility: OTHER | Age: 80
End: 2025-06-03
Attending: INTERNAL MEDICINE
Payer: MEDICARE

## 2025-06-03 ENCOUNTER — OFFICE VISIT (OUTPATIENT)
Dept: INTERNAL MEDICINE | Facility: OTHER | Age: 80
End: 2025-06-03
Attending: INTERNAL MEDICINE
Payer: MEDICARE

## 2025-06-03 VITALS
TEMPERATURE: 97 F | HEART RATE: 66 BPM | HEIGHT: 69 IN | RESPIRATION RATE: 16 BRPM | OXYGEN SATURATION: 99 % | DIASTOLIC BLOOD PRESSURE: 80 MMHG | WEIGHT: 194.4 LBS | SYSTOLIC BLOOD PRESSURE: 138 MMHG | BODY MASS INDEX: 28.79 KG/M2

## 2025-06-03 DIAGNOSIS — I10 BENIGN ESSENTIAL HYPERTENSION: ICD-10-CM

## 2025-06-03 DIAGNOSIS — R19.5 LOOSE STOOLS: ICD-10-CM

## 2025-06-03 DIAGNOSIS — N18.2 CKD (CHRONIC KIDNEY DISEASE) STAGE 2, GFR 60-89 ML/MIN: ICD-10-CM

## 2025-06-03 DIAGNOSIS — E11.319 TYPE 2 DIABETES MELLITUS WITH RETINOPATHY OF LEFT EYE, WITHOUT LONG-TERM CURRENT USE OF INSULIN, MACULAR EDEMA PRESENCE UNSPECIFIED, UNSPECIFIED RETINOPATHY SEVERITY (H): Primary | ICD-10-CM

## 2025-06-03 DIAGNOSIS — E78.2 MIXED HYPERLIPIDEMIA: ICD-10-CM

## 2025-06-03 DIAGNOSIS — E11.319 TYPE 2 DIABETES MELLITUS WITH RETINOPATHY OF LEFT EYE, WITHOUT LONG-TERM CURRENT USE OF INSULIN, MACULAR EDEMA PRESENCE UNSPECIFIED, UNSPECIFIED RETINOPATHY SEVERITY (H): ICD-10-CM

## 2025-06-03 PROBLEM — R04.0 LEFT-SIDED NOSEBLEED: Status: RESOLVED | Noted: 2025-02-11 | Resolved: 2025-06-03

## 2025-06-03 LAB
ALBUMIN SERPL BCG-MCNC: 4.5 G/DL (ref 3.5–5.2)
ALBUMIN UR-MCNC: NEGATIVE MG/DL
ALP SERPL-CCNC: 93 U/L (ref 40–150)
ALT SERPL W P-5'-P-CCNC: 22 U/L (ref 0–70)
ANION GAP SERPL CALCULATED.3IONS-SCNC: 12 MMOL/L (ref 7–15)
APPEARANCE UR: CLEAR
AST SERPL W P-5'-P-CCNC: 23 U/L (ref 0–45)
BILIRUB SERPL-MCNC: 1.2 MG/DL
BILIRUB UR QL STRIP: NEGATIVE
BUN SERPL-MCNC: 9.1 MG/DL (ref 8–23)
CALCIUM SERPL-MCNC: 9.8 MG/DL (ref 8.8–10.4)
CHLORIDE SERPL-SCNC: 102 MMOL/L (ref 98–107)
CHOLEST SERPL-MCNC: 105 MG/DL
COLOR UR AUTO: NORMAL
CREAT SERPL-MCNC: 0.77 MG/DL (ref 0.67–1.17)
CREAT UR-MCNC: 44.6 MG/DL
EGFRCR SERPLBLD CKD-EPI 2021: >90 ML/MIN/1.73M2
ERYTHROCYTE [DISTWIDTH] IN BLOOD BY AUTOMATED COUNT: 13.3 % (ref 10–15)
EST. AVERAGE GLUCOSE BLD GHB EST-MCNC: 154 MG/DL
FASTING STATUS PATIENT QL REPORTED: YES
FASTING STATUS PATIENT QL REPORTED: YES
GLUCOSE SERPL-MCNC: 136 MG/DL (ref 70–99)
GLUCOSE UR STRIP-MCNC: NEGATIVE MG/DL
HBA1C MFR BLD: 7 %
HCO3 SERPL-SCNC: 24 MMOL/L (ref 22–29)
HCT VFR BLD AUTO: 46.5 % (ref 40–53)
HDLC SERPL-MCNC: 45 MG/DL
HGB BLD-MCNC: 15.8 G/DL (ref 13.3–17.7)
HGB UR QL STRIP: NEGATIVE
KETONES UR STRIP-MCNC: NEGATIVE MG/DL
LDLC SERPL CALC-MCNC: 47 MG/DL
LEUKOCYTE ESTERASE UR QL STRIP: NEGATIVE
MCH RBC QN AUTO: 30.3 PG (ref 26.5–33)
MCHC RBC AUTO-ENTMCNC: 34 G/DL (ref 31.5–36.5)
MCV RBC AUTO: 89 FL (ref 78–100)
MICROALBUMIN UR-MCNC: <12 MG/L
MICROALBUMIN/CREAT UR: NORMAL MG/G{CREAT}
NITRATE UR QL: NEGATIVE
NONHDLC SERPL-MCNC: 60 MG/DL
PH UR STRIP: 5.5 [PH] (ref 5–9)
PLATELET # BLD AUTO: 237 10E3/UL (ref 150–450)
POTASSIUM SERPL-SCNC: 4 MMOL/L (ref 3.4–5.3)
PROT SERPL-MCNC: 7.7 G/DL (ref 6.4–8.3)
RBC # BLD AUTO: 5.22 10E6/UL (ref 4.4–5.9)
SODIUM SERPL-SCNC: 138 MMOL/L (ref 135–145)
SP GR UR STRIP: 1.01 (ref 1–1.03)
TRIGL SERPL-MCNC: 63 MG/DL
TSH SERPL DL<=0.005 MIU/L-ACNC: 0.99 UIU/ML (ref 0.3–4.2)
UROBILINOGEN UR STRIP-MCNC: NORMAL MG/DL
WBC # BLD AUTO: 7.5 10E3/UL (ref 4–11)

## 2025-06-03 PROCEDURE — 83718 ASSAY OF LIPOPROTEIN: CPT | Mod: ZL

## 2025-06-03 PROCEDURE — 84132 ASSAY OF SERUM POTASSIUM: CPT | Mod: ZL

## 2025-06-03 PROCEDURE — 82043 UR ALBUMIN QUANTITATIVE: CPT | Mod: ZL

## 2025-06-03 PROCEDURE — 85027 COMPLETE CBC AUTOMATED: CPT | Mod: ZL

## 2025-06-03 PROCEDURE — G0463 HOSPITAL OUTPT CLINIC VISIT: HCPCS

## 2025-06-03 PROCEDURE — 83036 HEMOGLOBIN GLYCOSYLATED A1C: CPT | Mod: ZL

## 2025-06-03 PROCEDURE — 84443 ASSAY THYROID STIM HORMONE: CPT | Mod: ZL

## 2025-06-03 PROCEDURE — 82947 ASSAY GLUCOSE BLOOD QUANT: CPT | Mod: ZL

## 2025-06-03 PROCEDURE — 82465 ASSAY BLD/SERUM CHOLESTEROL: CPT | Mod: ZL

## 2025-06-03 PROCEDURE — 81003 URINALYSIS AUTO W/O SCOPE: CPT | Mod: ZL

## 2025-06-03 PROCEDURE — 36415 COLL VENOUS BLD VENIPUNCTURE: CPT | Mod: ZL

## 2025-06-03 PROCEDURE — 82570 ASSAY OF URINE CREATININE: CPT | Mod: ZL

## 2025-06-03 RX ORDER — GLIPIZIDE 10 MG/1
10 TABLET ORAL
Qty: 180 TABLET | Refills: 1 | Status: SHIPPED | OUTPATIENT
Start: 2025-06-03

## 2025-06-03 ASSESSMENT — ENCOUNTER SYMPTOMS
ABDOMINAL PAIN: 0
MYALGIAS: 0
VOMITING: 0
WHEEZING: 0
NAUSEA: 0
SHORTNESS OF BREATH: 0
BRUISES/BLEEDS EASILY: 0
ABDOMINAL DISTENTION: 1
FEVER: 0
CHILLS: 0
ARTHRALGIAS: 0
DIARRHEA: 1
CONFUSION: 0
COUGH: 0
WOUND: 0
LIGHT-HEADEDNESS: 0
DYSURIA: 0
AGITATION: 0
DIZZINESS: 0
HEMATURIA: 0

## 2025-06-03 ASSESSMENT — PAIN SCALES - GENERAL: PAINLEVEL_OUTOF10: NO PAIN (0)

## 2025-06-03 NOTE — PATIENT INSTRUCTIONS
Blood pressure is controlled.   Diabetes is well controlled.     Medications refilled.   Labs are stable.         Get your Last Shingles vaccine - anytime from the pharmacy.     Stop Metformin for now.     Restart Jardiance 10 mg once daily.     - Check at [facility] for ** [facility] **         Results for orders placed or performed in visit on 06/03/25   Urine Macroscopic with reflex to Microscopic     Status: Normal   Result Value Ref Range    Color Urine Light Yellow Colorless, Straw, Light Yellow, Yellow    Appearance Urine Clear Clear    Glucose Urine Negative Negative mg/dL    Bilirubin Urine Negative Negative    Ketones Urine Negative Negative mg/dL    Specific Gravity Urine 1.006 1.000 - 1.030    Blood Urine Negative Negative    pH Urine 5.5 5.0 - 9.0    Protein Albumin Urine Negative Negative mg/dL    Urobilinogen Urine Normal Normal mg/dL    Nitrite Urine Negative Negative    Leukocyte Esterase Urine Negative Negative    Narrative    Microscopic not indicated   Hemoglobin A1c     Status: Abnormal   Result Value Ref Range    Estimated Average Glucose 154 (H) <117 mg/dL    Hemoglobin A1C 7.0 (H) <5.7 %   CBC with platelets     Status: Normal   Result Value Ref Range    WBC Count 7.5 4.0 - 11.0 10e3/uL    RBC Count 5.22 4.40 - 5.90 10e6/uL    Hemoglobin 15.8 13.3 - 17.7 g/dL    Hematocrit 46.5 40.0 - 53.0 %    MCV 89 78 - 100 fL    MCH 30.3 26.5 - 33.0 pg    MCHC 34.0 31.5 - 36.5 g/dL    RDW 13.3 10.0 - 15.0 %    Platelet Count 237 150 - 450 10e3/uL   Lipid Profile     Status: None   Result Value Ref Range    Cholesterol 105 <200 mg/dL    Triglycerides 63 <150 mg/dL    Direct Measure HDL 45 >=40 mg/dL    LDL Cholesterol Calculated 47 <100 mg/dL    Non HDL Cholesterol 60 <130 mg/dL    Patient Fasting > 8hrs? Yes     Narrative    Cholesterol  Desirable: < 200 mg/dL  Borderline High: 200 - 239 mg/dL  High: >= 240 mg/dL    Triglycerides  Normal: < 150  mg/dL  Borderline High: 150 - 199 mg/dL  High: 200-499 mg/dL  Very High: >= 500 mg/dL    Direct Measure HDL  Female: >= 50 mg/dL   Male: >= 40 mg/dL    LDL Cholesterol  Desirable: < 100 mg/dL  Above Desirable: 100 - 129 mg/dL   Borderline High: 130 - 159 mg/dL   High:  160 - 189 mg/dL   Very High: >= 190 mg/dL    Non HDL Cholesterol  Desirable: < 130 mg/dL  Above Desirable: 130 - 159 mg/dL  Borderline High: 160 - 189 mg/dL  High: 190 - 219 mg/dL  Very High: >= 220 mg/dL   Comprehensive metabolic panel     Status: Abnormal   Result Value Ref Range    Sodium 138 135 - 145 mmol/L    Potassium 4.0 3.4 - 5.3 mmol/L    Carbon Dioxide (CO2) 24 22 - 29 mmol/L    Anion Gap 12 7 - 15 mmol/L    Urea Nitrogen 9.1 8.0 - 23.0 mg/dL    Creatinine 0.77 0.67 - 1.17 mg/dL    GFR Estimate >90 >60 mL/min/1.73m2    Calcium 9.8 8.8 - 10.4 mg/dL    Chloride 102 98 - 107 mmol/L    Glucose 136 (H) 70 - 99 mg/dL    Alkaline Phosphatase 93 40 - 150 U/L    AST 23 0 - 45 U/L    ALT 22 0 - 70 U/L    Protein Total 7.7 6.4 - 8.3 g/dL    Albumin 4.5 3.5 - 5.2 g/dL    Bilirubin Total 1.2 <=1.2 mg/dL    Patient Fasting > 8hrs? Yes           Aspects of Diabetes:   Recent Labs   Lab Test 06/03/25  1310 02/21/25  1304 10/07/24  1350   A1C 7.0* 6.7* 6.6*   LDL 47 41 57   HDL 45 38* 43   TRIG 63 92 90   ALT 22 22 25   CR 0.77 0.85 0.86   GFRESTIMATED >90 88 89   POTASSIUM 4.0 4.5 4.6   TSH  --  1.15 1.12   WBC 7.5 8.4 8.0   HGB 15.8 15.8 16.6    212 231   ALBUMIN 4.5 4.6 4.7      Hemoglobin A1c  Goal range is under 8%. Best is 6.5 to 7   Blood Pressure 138/80 Goal to keep less than 140/90   Tobacco  reports that he quit smoking about 23 years ago. His smoking use included cigarettes. He has never used smokeless tobacco. Goal to abstain from tobacco   Aspirin or Plavix Anti-platelet therapy Aspirin or Plavix reduces risk of heart disease and stroke  -- sometimes used with other blood thinners, depending on bleeding risk and risk factors.    ACE/ARB  Specific blood pressure meds These medications can reduce risk of kidney disease   Cholesterol Statins (Lipitor, Crestor, vs others) Statins reduce risk of heart disease and stroke   Eye Exam -- Do Yearly -- Annual diabetic eye exam   Healthy weight Wt Readings from Last 4 Encounters:   06/03/25 88.2 kg (194 lb 6.4 oz)   02/21/25 89.3 kg (196 lb 12.8 oz)   12/13/24 89.9 kg (198 lb 3.2 oz)   01/18/24 93.4 kg (206 lb)      Body mass index is 28.71 kg/m .  Goal BMI under 30, best is under 25.      -- Trying to exercise daily (goal at least 20 min/day) with moderate aerobic activity   -- Eat healthy (resources from ADA at http://www.diabetes.org/)   -- Taking good care of my feet. Consider seeing the Podiatrist   -- Check blood sugars as directed, record in log book and bring to every appointment    Insurance companies are grading you and I on your blood sugar control -- Goal is to get your A1c down to 7.9% or lower and NO Smoking!  -- Medicare and most insurance companies, will only cover Hemoglobin A1c labs to be rechecked every 91+ days.      Return for Diabetes labs and clinic follow-up appointment every 3 to 4 months.    Schedule lab only appointment --- A few days AFTER: 9/1/25   Schedule clinic appointment with Dr. Heredia -- Same day as labs, or 1-2 days later.

## 2025-06-03 NOTE — PROGRESS NOTES
Assessment & Plan     ICD-10-CM    1. 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 Mixed hyperlipidemia  E78.2       3. Benign essential hypertension  I10       4. CKD (chronic kidney disease) stage 2, GFR 60-89 ml/min  N18.2 empagliflozin (JARDIANCE) 10 MG TABS tablet      5. Loose stools  R19.5          Patient presents for diabetes follow-up, as well as follow-up multiple issues.    Has been having rather significant diarrhea, suspect this is due to his metformin.  Was previously on 2 tablets or 1000 mg daily, not under 1 tablet and has been having persistent loose stools and diarrhea.  Recommend discontinuation.  Stop metformin for now.  Continue glipizide.  Start Jardiance.  States that he was on Jardiance in the past but the cost was very high.  Check on cost and coverage of 340B program at [facility] and [facility] pharmacy.     HYPERTENSION - Ongoing. Blood pressure is currently controlled.  Medication side effects: None. Denies syncope or presyncope.  Continue current medications.   Medication list reviewed/updated. Refills completed as needed.      MIXED HYPERLIPIDEMIA.  Ongoing. LDL is at goal: Yes. Triglycerides are at goal: Yes.    Medication side effects reported: None.   Continue current medications for now. Medication list reviewed/updated. Refills completed as needed.  Recent Labs   Lab Test 06/03/25  1310 02/21/25  1304   CHOL 105 97   HDL 45 38*   LDL 47 41   TRIG 63 92        Chronic Kidney Disease, Stage 2 (GFR 60-89), chronic, ongoing.  Kidney function had been slowly declining.  Encourage NSAID avoidance.    Recent Labs   Lab Test 06/03/25  1310 02/21/25  1304   CR 0.77 0.85   GFRESTIMATED >90 88        Vaccine counseling completed.  Encourage routine / annual  vaccinations.    Type 2 Diabetes Mellitus, with nephropathy, with retinopathy.  Blood sugar control has been good with minimal hyperglycemia.  Besides diarrhea with metformin, otherwise doing well with diet, oral agents, and exercise - xNO Insulin injections per day.  Medication list reviewed/updated. Refills completed as needed.      + Metformin -> Diarrhea.     Complicating factors include but are not limited to: hypertension, hyperlipidemia, retinopathy, and chronic kidney disease.     Recent Labs   Lab Test 06/03/25  1310 02/21/25  1304 10/07/24  1350   A1C 7.0* 6.7* 6.6*   LDL 47 41 57   HDL 45 38* 43   TRIG 63 92 90   ALT 22 22 25   CR 0.77 0.85 0.86   GFRESTIMATED >90 88 89   POTASSIUM 4.0 4.5 4.6   TSH 0.99 1.15 1.12   WBC 7.5 8.4 8.0   HGB 15.8 15.8 16.6    212 231   ALBUMIN 4.5 4.6 4.7        The longitudinal plan of care for the diagnosis(es)/condition(s) as documented were addressed during this visit. Due to the added complexity in care, I will continue to support Irving in the subsequent management and with ongoing continuity of care.        Results for orders placed or performed in visit on 06/03/25   Urine Macroscopic with reflex to Microscopic     Status: Normal   Result Value Ref Range    Color Urine Light Yellow Colorless, Straw, Light Yellow, Yellow    Appearance Urine Clear Clear    Glucose Urine Negative Negative mg/dL    Bilirubin Urine Negative Negative    Ketones Urine Negative Negative mg/dL    Specific Gravity Urine 1.006 1.000 - 1.030    Blood Urine Negative Negative    pH Urine 5.5 5.0 - 9.0    Protein Albumin Urine Negative Negative mg/dL    Urobilinogen Urine Normal Normal mg/dL    Nitrite Urine Negative Negative    Leukocyte Esterase Urine Negative Negative    Narrative    Microscopic not indicated   TSH with free T4 reflex     Status: Normal   Result Value Ref Range    TSH 0.99 0.30 - 4.20 uIU/mL   Hemoglobin A1c     Status: Abnormal   Result Value Ref Range    Estimated Average  Glucose 154 (H) <117 mg/dL    Hemoglobin A1C 7.0 (H) <5.7 %   CBC with platelets     Status: Normal   Result Value Ref Range    WBC Count 7.5 4.0 - 11.0 10e3/uL    RBC Count 5.22 4.40 - 5.90 10e6/uL    Hemoglobin 15.8 13.3 - 17.7 g/dL    Hematocrit 46.5 40.0 - 53.0 %    MCV 89 78 - 100 fL    MCH 30.3 26.5 - 33.0 pg    MCHC 34.0 31.5 - 36.5 g/dL    RDW 13.3 10.0 - 15.0 %    Platelet Count 237 150 - 450 10e3/uL   Albumin Random Urine Quantitative with Creat Ratio     Status: None   Result Value Ref Range    Creatinine Urine mg/dL 44.6 mg/dL    Albumin Urine mg/L <12.0 mg/L    Albumin Urine mg/g Cr     Lipid Profile     Status: None   Result Value Ref Range    Cholesterol 105 <200 mg/dL    Triglycerides 63 <150 mg/dL    Direct Measure HDL 45 >=40 mg/dL    LDL Cholesterol Calculated 47 <100 mg/dL    Non HDL Cholesterol 60 <130 mg/dL    Patient Fasting > 8hrs? Yes     Narrative    Cholesterol  Desirable: < 200 mg/dL  Borderline High: 200 - 239 mg/dL  High: >= 240 mg/dL    Triglycerides  Normal: < 150 mg/dL  Borderline High: 150 - 199 mg/dL  High: 200-499 mg/dL  Very High: >= 500 mg/dL    Direct Measure HDL  Female: >= 50 mg/dL   Male: >= 40 mg/dL    LDL Cholesterol  Desirable: < 100 mg/dL  Above Desirable: 100 - 129 mg/dL   Borderline High: 130 - 159 mg/dL   High:  160 - 189 mg/dL   Very High: >= 190 mg/dL    Non HDL Cholesterol  Desirable: < 130 mg/dL  Above Desirable: 130 - 159 mg/dL  Borderline High: 160 - 189 mg/dL  High: 190 - 219 mg/dL  Very High: >= 220 mg/dL   Comprehensive metabolic panel     Status: Abnormal   Result Value Ref Range    Sodium 138 135 - 145 mmol/L    Potassium 4.0 3.4 - 5.3 mmol/L    Carbon Dioxide (CO2) 24 22 - 29 mmol/L    Anion Gap 12 7 - 15 mmol/L    Urea Nitrogen 9.1 8.0 - 23.0 mg/dL    Creatinine 0.77 0.67 - 1.17 mg/dL    GFR Estimate >90 >60 mL/min/1.73m2    Calcium 9.8 8.8 - 10.4 mg/dL    Chloride 102 98 - 107 mmol/L    Glucose 136 (H) 70 - 99 mg/dL    Alkaline Phosphatase 93 40 - 150  U/L    AST 23 0 - 45 U/L    ALT 22 0 - 70 U/L    Protein Total 7.7 6.4 - 8.3 g/dL    Albumin 4.5 3.5 - 5.2 g/dL    Bilirubin Total 1.2 <=1.2 mg/dL    Patient Fasting > 8hrs? Yes       Random glucose is elevated at 136.  Liver enzymes normal.  Creatinine 0.77 with GFR greater than 90.  Potassium normal.  Cholesterol levels are at goal.  Urine micro be been within normal limits.  CBC normal.  Hemoglobin A1c is well-controlled.  TSH normal.  Urinalysis normal.               Return in about 3 months (around 9/3/2025) for - Labs every 91+ days, with DM Follow-up, Same Day or 1-2 days later with Dr. Heredia.      Heron Heredia MD  [facility]    Review of Systems   Constitutional:  Negative for chills and fever.   HENT:  Positive for nosebleeds (Intermittent, worse with dry air, winter). Negative for congestion and hearing loss.    Eyes:  Negative for visual disturbance.   Respiratory:  Negative for cough, shortness of breath and wheezing.    Cardiovascular:  Negative for chest pain.   Gastrointestinal:  Positive for abdominal distention (with frequent flatulence) and diarrhea (+ From Metformin - stopped 6/3/2025). Negative for abdominal pain, nausea and vomiting.   Endocrine: Negative for cold intolerance and heat intolerance.   Genitourinary:  Negative for dysuria and hematuria.   Musculoskeletal:  Negative for arthralgias and myalgias.        Hx rotator cuff repair bilaterally    Skin:  Negative for rash and wound.   Allergic/Immunologic: Negative for immunocompromised state.   Neurological:  Negative for dizziness and light-headedness.   Hematological:  Does not bruise/bleed easily.   Psychiatric/Behavioral:  Negative for agitation and confusion.        Subjective   Irving is a 79 year old, presenting for the following health issues:  Diabetes        6/3/2025     1:40 PM   Additional Questions   Roomed by Laura   Accompanied by Self     History of Present Illness       He eats 2-3 servings of  "fruits and vegetables daily.He consumes 0 sweetened beverage(s) daily.He exercises with enough effort to increase his heart rate 60 or more minutes per day.  He exercises with enough effort to increase his heart rate 4 days per week.   He is taking medications regularly.                      Objective    /80   Pulse 66   Temp 97  F (36.1  C) (Tympanic)   Resp 16   Ht 1.753 m (5' 9\")   Wt 88.2 kg (194 lb 6.4 oz)   SpO2 99%   BMI 28.71 kg/m    Body mass index is 28.71 kg/m .  Physical Exam  Constitutional:       General: He is not in acute distress.     Appearance: Normal appearance. He is well-developed. He is not ill-appearing.   Eyes:      General: No scleral icterus.     Conjunctiva/sclera: Conjunctivae normal.   Neck:      Vascular: No carotid bruit.   Cardiovascular:      Rate and Rhythm: Normal rate and regular rhythm.      Pulses: Normal pulses.   Pulmonary:      Effort: Pulmonary effort is normal. No respiratory distress.      Breath sounds: No wheezing.   Abdominal:      Palpations: Abdomen is soft.      Tenderness: There is no abdominal tenderness.   Musculoskeletal:         General: No tenderness or deformity.      Right lower leg: No edema.      Left lower leg: No edema.   Skin:     General: Skin is warm and dry.      Findings: No rash.   Neurological:      Mental Status: He is alert. Mental status is at baseline.      Cranial Nerves: No cranial nerve deficit.   Psychiatric:         Mood and Affect: Mood normal.         Behavior: Behavior normal.                    Signed Electronically by: Heron Heredia MD    "

## 2025-08-18 ENCOUNTER — MYC REFILL (OUTPATIENT)
Dept: INTERNAL MEDICINE | Facility: OTHER | Age: 80
End: 2025-08-18
Payer: MEDICARE

## 2025-08-18 DIAGNOSIS — N18.2 CKD (CHRONIC KIDNEY DISEASE) STAGE 2, GFR 60-89 ML/MIN: ICD-10-CM

## 2025-08-18 DIAGNOSIS — E11.319 TYPE 2 DIABETES MELLITUS WITH RETINOPATHY OF LEFT EYE, WITHOUT LONG-TERM CURRENT USE OF INSULIN, MACULAR EDEMA PRESENCE UNSPECIFIED, UNSPECIFIED RETINOPATHY SEVERITY (H): ICD-10-CM

## (undated) DEVICE — NDL-25G 1-1/2" NON-SAFETY

## (undated) DEVICE — LIGHT HANDLE COVER

## (undated) DEVICE — SOL WATER 1500ML

## (undated) DEVICE — SUCTION MANIFOLD NEPTUNE 2 SYS 4 PORT 0702-020-000

## (undated) DEVICE — BLANKET-BAIR UPPER BODY

## (undated) DEVICE — NDL COUNTER-20-40 CT MAGNET/FOAM BLOCK

## (undated) DEVICE — MARKER-SKIN REG

## (undated) DEVICE — TUBING-SUCTION 20FT

## (undated) DEVICE — GOWN-SURG XL LVL 3 REINFORCED

## (undated) DEVICE — SYRINGE-ASEPTO IRRIGATION

## (undated) DEVICE — CAUTERY PAD-POLYHESIVE II ADULT

## (undated) DEVICE — BLADE-SCALPEL #10

## (undated) DEVICE — BLADE-SURG CLIPPER

## (undated) DEVICE — CAUTERY-MEGADYNE TIP

## (undated) DEVICE — GOWN-SURG XXL LVL 3 REINFORCED

## (undated) DEVICE — CANISTER-SUCTION 2000CC

## (undated) DEVICE — SYRINGE-10CC LUER LOCK

## (undated) DEVICE — DRSG-SPONGE STERILE 4 X 4

## (undated) DEVICE — CONTAINER-STERILE 4OZ WRAPPED (OR)

## (undated) DEVICE — IRRIGATION-NACL 1000ML

## (undated) DEVICE — DRSG-HEAVY DRAINAGE D-UNIT

## (undated) DEVICE — TAPE-MEDIPORE 4" X 2YD

## (undated) DEVICE — ENDO BRUSH CHANNEL MASTER CLEANING 2-4.2MM BW-412T

## (undated) DEVICE — BLADE-SCALPEL #15

## (undated) DEVICE — DRSG-KERLIX ROLL 4.5 X 4.1YD

## (undated) DEVICE — ANOSCOPE EXAMINATION DISP W/LIGHT 18X90MM C060100

## (undated) DEVICE — CAUTERY PENCIL

## (undated) DEVICE — PACK-SET UP-CUSTOM

## (undated) DEVICE — GLV-7.0 PROTEXIS PI CLASSIC LF/PF

## (undated) DEVICE — SPONGE-LAPAROTOMY PADS 18 X 18

## (undated) DEVICE — ENDO SNARE POLYPECTOMY OVAL 10MM LOOP SD-240U-10

## (undated) DEVICE — PULSE LAVAGE IRRIGATION SYSTEM

## (undated) DEVICE — DRSG-KERLIX 6 X 6 3/4 FLUFF

## (undated) DEVICE — ENDO KIT COMPLIANCE DYKENDOCMPLY

## (undated) DEVICE — TRAY-SKIN PREP POVIDONE/IODINE

## (undated) DEVICE — PACK-LAPAROTOMY-CUSTOM

## (undated) DEVICE — Device

## (undated) DEVICE — IRRIGATION-H2O 1000ML

## (undated) DEVICE — DRSG-ABDOMINAL 5 X 9

## (undated) DEVICE — SUTURE-VICRYL 3-0 SH J416H

## (undated) DEVICE — DRSG-SPONGE X-RAY 4 X 4

## (undated) DEVICE — SCD SLEEVE-KNEE REG.

## (undated) DEVICE — TUBING SUCTION 10'X3/16" N510

## (undated) RX ORDER — ONDANSETRON 2 MG/ML
INJECTION INTRAMUSCULAR; INTRAVENOUS
Status: DISPENSED
Start: 2017-03-23

## (undated) RX ORDER — PROPOFOL 10 MG/ML
INJECTION, EMULSION INTRAVENOUS
Status: DISPENSED
Start: 2017-03-22

## (undated) RX ORDER — ACETAMINOPHEN 10 MG/ML
INJECTION, SOLUTION INTRAVENOUS
Status: DISPENSED
Start: 2023-03-21

## (undated) RX ORDER — FENTANYL CITRATE 50 UG/ML
INJECTION, SOLUTION INTRAMUSCULAR; INTRAVENOUS
Status: DISPENSED
Start: 2017-03-22

## (undated) RX ORDER — ONDANSETRON 2 MG/ML
INJECTION INTRAMUSCULAR; INTRAVENOUS
Status: DISPENSED
Start: 2017-03-22

## (undated) RX ORDER — DEXAMETHASONE SODIUM PHOSPHATE 10 MG/ML
INJECTION, SOLUTION INTRAMUSCULAR; INTRAVENOUS
Status: DISPENSED
Start: 2017-03-22

## (undated) RX ORDER — FENTANYL CITRATE 50 UG/ML
INJECTION, SOLUTION INTRAMUSCULAR; INTRAVENOUS
Status: DISPENSED
Start: 2017-03-23

## (undated) RX ORDER — LIDOCAINE HYDROCHLORIDE 20 MG/ML
INJECTION, SOLUTION EPIDURAL; INFILTRATION; INTRACAUDAL; PERINEURAL
Status: DISPENSED
Start: 2017-03-22

## (undated) RX ORDER — DEXAMETHASONE SODIUM PHOSPHATE 10 MG/ML
INJECTION, SOLUTION INTRAMUSCULAR; INTRAVENOUS
Status: DISPENSED
Start: 2017-03-23

## (undated) RX ORDER — LIDOCAINE HYDROCHLORIDE 20 MG/ML
INJECTION, SOLUTION EPIDURAL; INFILTRATION; INTRACAUDAL; PERINEURAL
Status: DISPENSED
Start: 2017-03-23

## (undated) RX ORDER — FENTANYL CITRATE 50 UG/ML
INJECTION, SOLUTION INTRAMUSCULAR; INTRAVENOUS
Status: DISPENSED
Start: 2017-03-24

## (undated) RX ORDER — PROPOFOL 10 MG/ML
INJECTION, EMULSION INTRAVENOUS
Status: DISPENSED
Start: 2019-05-08

## (undated) RX ORDER — PROPOFOL 10 MG/ML
INJECTION, EMULSION INTRAVENOUS
Status: DISPENSED
Start: 2017-03-23

## (undated) RX ORDER — PHENYLEPHRINE HCL IN 0.9% NACL 1 MG/10 ML
SYRINGE (ML) INTRAVENOUS
Status: DISPENSED
Start: 2019-05-08